# Patient Record
Sex: FEMALE | Race: BLACK OR AFRICAN AMERICAN | Employment: OTHER | URBAN - METROPOLITAN AREA
[De-identification: names, ages, dates, MRNs, and addresses within clinical notes are randomized per-mention and may not be internally consistent; named-entity substitution may affect disease eponyms.]

---

## 2022-05-23 ENCOUNTER — APPOINTMENT (OUTPATIENT)
Dept: NON INVASIVE DIAGNOSTICS | Age: 87
DRG: 871 | End: 2022-05-23
Attending: HOSPITALIST
Payer: MEDICARE

## 2022-05-23 ENCOUNTER — HOSPITAL ENCOUNTER (EMERGENCY)
Age: 87
Discharge: ANOTHER HOSPITAL | End: 2022-05-23
Attending: EMERGENCY MEDICINE
Payer: MEDICARE

## 2022-05-23 ENCOUNTER — APPOINTMENT (OUTPATIENT)
Dept: CT IMAGING | Age: 87
DRG: 871 | End: 2022-05-23
Attending: HOSPITALIST
Payer: MEDICARE

## 2022-05-23 ENCOUNTER — APPOINTMENT (OUTPATIENT)
Dept: GENERAL RADIOLOGY | Age: 87
End: 2022-05-23
Attending: EMERGENCY MEDICINE
Payer: MEDICARE

## 2022-05-23 ENCOUNTER — APPOINTMENT (OUTPATIENT)
Dept: CT IMAGING | Age: 87
End: 2022-05-23
Attending: EMERGENCY MEDICINE
Payer: MEDICARE

## 2022-05-23 ENCOUNTER — HOSPITAL ENCOUNTER (INPATIENT)
Age: 87
LOS: 4 days | Discharge: NURSING, REHAB OR LONG-TERM CARE | DRG: 871 | End: 2022-05-27
Attending: EMERGENCY MEDICINE | Admitting: HOSPITALIST
Payer: MEDICARE

## 2022-05-23 VITALS
HEART RATE: 96 BPM | SYSTOLIC BLOOD PRESSURE: 95 MMHG | RESPIRATION RATE: 34 BRPM | WEIGHT: 162.2 LBS | DIASTOLIC BLOOD PRESSURE: 42 MMHG | OXYGEN SATURATION: 96 % | TEMPERATURE: 100.8 F | BODY MASS INDEX: 27.02 KG/M2 | HEIGHT: 65 IN

## 2022-05-23 DIAGNOSIS — R06.02 SOB (SHORTNESS OF BREATH): Primary | ICD-10-CM

## 2022-05-23 DIAGNOSIS — J18.9 PNEUMONIA OF BOTH LUNGS DUE TO INFECTIOUS ORGANISM, UNSPECIFIED PART OF LUNG: Primary | ICD-10-CM

## 2022-05-23 PROBLEM — J96.90 RESPIRATORY FAILURE (HCC): Status: ACTIVE | Noted: 2022-05-23

## 2022-05-23 LAB
ALBUMIN SERPL-MCNC: 2.7 G/DL (ref 3.5–5)
ALBUMIN/GLOB SERPL: 0.8 {RATIO} (ref 1.1–2.2)
ALP SERPL-CCNC: 120 U/L (ref 45–117)
ALT SERPL-CCNC: 180 U/L (ref 12–78)
ANION GAP SERPL CALC-SCNC: 7 MMOL/L (ref 5–15)
ARTERIAL PATENCY WRIST A: ABNORMAL
AST SERPL W P-5'-P-CCNC: 117 U/L (ref 15–37)
ATRIAL RATE: 102 BPM
ATRIAL RATE: 89 BPM
BASE DEFICIT BLDA-SCNC: 11.2 MMOL/L (ref 0–2)
BASOPHILS # BLD: 0 K/UL (ref 0–0.2)
BASOPHILS NFR BLD: 0 % (ref 0–2.5)
BDY SITE: ABNORMAL
BILIRUB SERPL-MCNC: 1.4 MG/DL (ref 0.2–1)
BNP SERPL-MCNC: 5580 PG/ML
BUN SERPL-MCNC: 24 MG/DL (ref 6–20)
BUN/CREAT SERPL: 12 (ref 12–20)
CA-I BLD-MCNC: 8.8 MG/DL (ref 8.5–10.1)
CALCULATED P AXIS, ECG09: 58 DEGREES
CALCULATED P AXIS, ECG09: 66 DEGREES
CALCULATED R AXIS, ECG10: 10 DEGREES
CALCULATED R AXIS, ECG10: 22 DEGREES
CALCULATED T AXIS, ECG11: 18 DEGREES
CALCULATED T AXIS, ECG11: 70 DEGREES
CHLORIDE SERPL-SCNC: 101 MMOL/L (ref 97–108)
CO2 SERPL-SCNC: 31 MMOL/L (ref 21–32)
COHGB MFR BLD: 0.3 % (ref 0–5)
CREAT SERPL-MCNC: 2 MG/DL (ref 0.55–1.02)
DIAGNOSIS, 93000: NORMAL
DIAGNOSIS, 93000: NORMAL
EOSINOPHIL # BLD: 0 K/UL (ref 0–0.7)
EOSINOPHIL NFR BLD: 0 % (ref 0.9–2.9)
EPAP/CPAP/PEEP, PAPEEP: 5
ERYTHROCYTE [DISTWIDTH] IN BLOOD BY AUTOMATED COUNT: 14.9 % (ref 11.5–14.5)
FIO2 ON VENT: 70 %
FLUAV RNA SPEC QL NAA+PROBE: NOT DETECTED
FLUBV RNA SPEC QL NAA+PROBE: NOT DETECTED
GLOBULIN SER CALC-MCNC: 3.5 G/DL (ref 2–4)
GLUCOSE SERPL-MCNC: 104 MG/DL (ref 65–100)
HCO3 BLDA-SCNC: 12 MMOL/L (ref 22–26)
HCT VFR BLD AUTO: 38.1 % (ref 36–46)
HGB BLD OXIMETRY-MCNC: 12.5 G/DL (ref 13.5–17.5)
HGB BLD-MCNC: 12.5 G/DL (ref 13.5–17.5)
IPAP/PIP, IPAPIP: 15
LACTATE SERPL-SCNC: 3.1 MMOL/L (ref 0.4–2)
LACTATE SERPL-SCNC: 5.5 MMOL/L (ref 0.4–2)
LACTATE SERPL-SCNC: 8 MMOL/L (ref 0.4–2)
LACTATE SERPL-SCNC: 8.2 MMOL/L (ref 0.4–2)
LYMPHOCYTES # BLD: 2.1 K/UL (ref 1–4.8)
LYMPHOCYTES NFR BLD: 7 % (ref 20.5–51.1)
MCH RBC QN AUTO: 29.8 PG (ref 31–34)
MCHC RBC AUTO-ENTMCNC: 32.7 G/DL (ref 31–36)
MCV RBC AUTO: 91.1 FL (ref 80–100)
METHGB MFR BLD: 0.3 % (ref 0–5)
MONOCYTES # BLD: 0.7 K/UL (ref 0.2–2.4)
MONOCYTES NFR BLD: 2 % (ref 1.7–9.3)
MRSA DNA SPEC QL NAA+PROBE: NOT DETECTED
NEUTS SEG # BLD: 27 K/UL (ref 1.8–7.7)
NEUTS SEG NFR BLD: 91 % (ref 42–75)
NRBC # BLD: 0.01 K/UL
NRBC BLD-RTO: 0 PER 100 WBC
OXYHGB MFR BLD: 98.8 % (ref 95–100)
P-R INTERVAL, ECG05: 134 MS
P-R INTERVAL, ECG05: 140 MS
PCO2 BLDA: 20 MMHG (ref 35–45)
PH BLDA: 7.38 [PH] (ref 7.35–7.45)
PLATELET # BLD AUTO: 276 K/UL (ref 150–400)
PMV BLD AUTO: 8 FL (ref 6.5–11.5)
PO2 BLDA: 194 MMHG (ref 70–100)
POTASSIUM SERPL-SCNC: 3.9 MMOL/L (ref 3.5–5.1)
PRESSURE SUPPORT SETTING VENT: 10 CM[H2O]
PROT SERPL-MCNC: 6.2 G/DL (ref 6.4–8.2)
Q-T INTERVAL, ECG07: 378 MS
Q-T INTERVAL, ECG07: 416 MS
QRS DURATION, ECG06: 70 MS
QRS DURATION, ECG06: 74 MS
QTC CALCULATION (BEZET), ECG08: 459 MS
QTC CALCULATION (BEZET), ECG08: 545 MS
RBC # BLD AUTO: 4.18 M/UL (ref 4.5–5.9)
SAO2% DEVICE SAO2% SENSOR NAME: ABNORMAL
SARS-COV-2, COV2: NOT DETECTED
SODIUM SERPL-SCNC: 139 MMOL/L (ref 136–145)
SPECIMEN SITE: ABNORMAL
TROPONIN-HIGH SENSITIVITY: 71 NG/L (ref 0–51)
VENTRICULAR RATE, ECG03: 103 BPM
VENTRICULAR RATE, ECG03: 89 BPM
WBC # BLD AUTO: 29.9 K/UL (ref 4.4–11.3)

## 2022-05-23 PROCEDURE — 87641 MR-STAPH DNA AMP PROBE: CPT

## 2022-05-23 PROCEDURE — 74011000250 HC RX REV CODE- 250: Performed by: HOSPITALIST

## 2022-05-23 PROCEDURE — 99285 EMERGENCY DEPT VISIT HI MDM: CPT

## 2022-05-23 PROCEDURE — 83605 ASSAY OF LACTIC ACID: CPT

## 2022-05-23 PROCEDURE — 83880 ASSAY OF NATRIURETIC PEPTIDE: CPT

## 2022-05-23 PROCEDURE — 94660 CPAP INITIATION&MGMT: CPT

## 2022-05-23 PROCEDURE — 87636 SARSCOV2 & INF A&B AMP PRB: CPT

## 2022-05-23 PROCEDURE — 93306 TTE W/DOPPLER COMPLETE: CPT

## 2022-05-23 PROCEDURE — 96375 TX/PRO/DX INJ NEW DRUG ADDON: CPT

## 2022-05-23 PROCEDURE — 96365 THER/PROPH/DIAG IV INF INIT: CPT

## 2022-05-23 PROCEDURE — 74011000250 HC RX REV CODE- 250: Performed by: EMERGENCY MEDICINE

## 2022-05-23 PROCEDURE — 36600 WITHDRAWAL OF ARTERIAL BLOOD: CPT

## 2022-05-23 PROCEDURE — 93005 ELECTROCARDIOGRAM TRACING: CPT

## 2022-05-23 PROCEDURE — 96374 THER/PROPH/DIAG INJ IV PUSH: CPT

## 2022-05-23 PROCEDURE — 74011250636 HC RX REV CODE- 250/636: Performed by: EMERGENCY MEDICINE

## 2022-05-23 PROCEDURE — 65610000006 HC RM INTENSIVE CARE

## 2022-05-23 PROCEDURE — 36415 COLL VENOUS BLD VENIPUNCTURE: CPT

## 2022-05-23 PROCEDURE — 85025 COMPLETE CBC W/AUTO DIFF WBC: CPT

## 2022-05-23 PROCEDURE — 77010033678 HC OXYGEN DAILY

## 2022-05-23 PROCEDURE — 84484 ASSAY OF TROPONIN QUANT: CPT

## 2022-05-23 PROCEDURE — 80053 COMPREHEN METABOLIC PANEL: CPT

## 2022-05-23 PROCEDURE — 74011000258 HC RX REV CODE- 258: Performed by: HOSPITALIST

## 2022-05-23 PROCEDURE — 71045 X-RAY EXAM CHEST 1 VIEW: CPT

## 2022-05-23 PROCEDURE — 5A09357 ASSISTANCE WITH RESPIRATORY VENTILATION, LESS THAN 24 CONSECUTIVE HOURS, CONTINUOUS POSITIVE AIRWAY PRESSURE: ICD-10-PCS | Performed by: HOSPITALIST

## 2022-05-23 PROCEDURE — 94640 AIRWAY INHALATION TREATMENT: CPT

## 2022-05-23 PROCEDURE — 74011250636 HC RX REV CODE- 250/636: Performed by: HOSPITALIST

## 2022-05-23 PROCEDURE — 82803 BLOOD GASES ANY COMBINATION: CPT

## 2022-05-23 PROCEDURE — 87040 BLOOD CULTURE FOR BACTERIA: CPT

## 2022-05-23 RX ORDER — SERTRALINE HYDROCHLORIDE 50 MG/1
50 TABLET, FILM COATED ORAL DAILY
COMMUNITY

## 2022-05-23 RX ORDER — LOPERAMIDE HYDROCHLORIDE 2 MG/1
4 CAPSULE ORAL AS NEEDED
COMMUNITY

## 2022-05-23 RX ORDER — AMLODIPINE BESYLATE 5 MG/1
10 TABLET ORAL DAILY
Status: DISCONTINUED | OUTPATIENT
Start: 2022-05-24 | End: 2022-05-28 | Stop reason: HOSPADM

## 2022-05-23 RX ORDER — ACETAMINOPHEN 650 MG/1
650 SUPPOSITORY RECTAL
Status: DISCONTINUED | OUTPATIENT
Start: 2022-05-23 | End: 2022-05-28 | Stop reason: HOSPADM

## 2022-05-23 RX ORDER — AMLODIPINE BESYLATE 10 MG/1
10 TABLET ORAL DAILY
COMMUNITY
End: 2022-05-27

## 2022-05-23 RX ORDER — ASPIRIN 81 MG/1
81 TABLET ORAL DAILY
Status: DISCONTINUED | OUTPATIENT
Start: 2022-05-24 | End: 2022-05-28 | Stop reason: HOSPADM

## 2022-05-23 RX ORDER — SODIUM CHLORIDE 0.9 % (FLUSH) 0.9 %
5-40 SYRINGE (ML) INJECTION EVERY 8 HOURS
Status: DISCONTINUED | OUTPATIENT
Start: 2022-05-23 | End: 2022-05-28 | Stop reason: HOSPADM

## 2022-05-23 RX ORDER — LEVOFLOXACIN 5 MG/ML
750 INJECTION, SOLUTION INTRAVENOUS ONCE
Status: COMPLETED | OUTPATIENT
Start: 2022-05-23 | End: 2022-05-23

## 2022-05-23 RX ORDER — SERTRALINE HYDROCHLORIDE 50 MG/1
50 TABLET, FILM COATED ORAL DAILY
Status: DISCONTINUED | OUTPATIENT
Start: 2022-05-24 | End: 2022-05-28 | Stop reason: HOSPADM

## 2022-05-23 RX ORDER — POTASSIUM CHLORIDE 750 MG/1
20 TABLET, FILM COATED, EXTENDED RELEASE ORAL DAILY
COMMUNITY

## 2022-05-23 RX ORDER — ASPIRIN 81 MG/1
81 TABLET ORAL DAILY
COMMUNITY

## 2022-05-23 RX ORDER — ONDANSETRON 4 MG/1
4 TABLET, ORALLY DISINTEGRATING ORAL
Status: DISCONTINUED | OUTPATIENT
Start: 2022-05-23 | End: 2022-05-28 | Stop reason: HOSPADM

## 2022-05-23 RX ORDER — LATANOPROST 50 UG/ML
1 SOLUTION/ DROPS OPHTHALMIC
COMMUNITY

## 2022-05-23 RX ORDER — LORAZEPAM 2 MG/ML
1 INJECTION INTRAMUSCULAR
Status: COMPLETED | OUTPATIENT
Start: 2022-05-23 | End: 2022-05-23

## 2022-05-23 RX ORDER — LEVOFLOXACIN 5 MG/ML
750 INJECTION, SOLUTION INTRAVENOUS EVERY 24 HOURS
Status: DISCONTINUED | OUTPATIENT
Start: 2022-05-23 | End: 2022-05-23

## 2022-05-23 RX ORDER — SODIUM CHLORIDE 0.9 % (FLUSH) 0.9 %
5-40 SYRINGE (ML) INJECTION AS NEEDED
Status: DISCONTINUED | OUTPATIENT
Start: 2022-05-23 | End: 2022-05-28 | Stop reason: HOSPADM

## 2022-05-23 RX ORDER — SODIUM CHLORIDE 0.9 % (FLUSH) 0.9 %
5-10 SYRINGE (ML) INJECTION AS NEEDED
Status: DISCONTINUED | OUTPATIENT
Start: 2022-05-23 | End: 2022-05-23 | Stop reason: HOSPADM

## 2022-05-23 RX ORDER — IPRATROPIUM BROMIDE AND ALBUTEROL SULFATE 2.5; .5 MG/3ML; MG/3ML
3 SOLUTION RESPIRATORY (INHALATION)
Status: COMPLETED | OUTPATIENT
Start: 2022-05-23 | End: 2022-05-23

## 2022-05-23 RX ORDER — FUROSEMIDE 10 MG/ML
40 INJECTION INTRAMUSCULAR; INTRAVENOUS 2 TIMES DAILY
Status: DISCONTINUED | OUTPATIENT
Start: 2022-05-23 | End: 2022-05-28 | Stop reason: HOSPADM

## 2022-05-23 RX ORDER — LOSARTAN POTASSIUM 50 MG/1
50 TABLET ORAL DAILY
COMMUNITY

## 2022-05-23 RX ORDER — ACETAMINOPHEN 325 MG/1
650 TABLET ORAL
Status: DISCONTINUED | OUTPATIENT
Start: 2022-05-23 | End: 2022-05-28 | Stop reason: HOSPADM

## 2022-05-23 RX ORDER — ATENOLOL 50 MG/1
50 TABLET ORAL DAILY
Status: DISCONTINUED | OUTPATIENT
Start: 2022-05-24 | End: 2022-05-26

## 2022-05-23 RX ORDER — ONDANSETRON 2 MG/ML
4 INJECTION INTRAMUSCULAR; INTRAVENOUS
Status: DISCONTINUED | OUTPATIENT
Start: 2022-05-23 | End: 2022-05-28 | Stop reason: HOSPADM

## 2022-05-23 RX ORDER — OMEPRAZOLE 20 MG/1
20 CAPSULE, DELAYED RELEASE ORAL DAILY
COMMUNITY

## 2022-05-23 RX ORDER — ACETAMINOPHEN 500 MG
500 TABLET ORAL
COMMUNITY

## 2022-05-23 RX ORDER — ATENOLOL 50 MG/1
50 TABLET ORAL DAILY
COMMUNITY
End: 2022-05-27

## 2022-05-23 RX ORDER — HALOPERIDOL 5 MG/ML
4 INJECTION INTRAMUSCULAR
Status: DISCONTINUED | OUTPATIENT
Start: 2022-05-23 | End: 2022-05-28 | Stop reason: HOSPADM

## 2022-05-23 RX ORDER — ENOXAPARIN SODIUM 100 MG/ML
30 INJECTION SUBCUTANEOUS DAILY
Status: DISCONTINUED | OUTPATIENT
Start: 2022-05-24 | End: 2022-05-28 | Stop reason: HOSPADM

## 2022-05-23 RX ORDER — POLYETHYLENE GLYCOL 3350 17 G/17G
17 POWDER, FOR SOLUTION ORAL DAILY PRN
Status: DISCONTINUED | OUTPATIENT
Start: 2022-05-23 | End: 2022-05-28 | Stop reason: HOSPADM

## 2022-05-23 RX ADMIN — SODIUM CHLORIDE, PRESERVATIVE FREE 10 ML: 5 INJECTION INTRAVENOUS at 21:11

## 2022-05-23 RX ADMIN — SODIUM CHLORIDE, PRESERVATIVE FREE 10 ML: 5 INJECTION INTRAVENOUS at 16:32

## 2022-05-23 RX ADMIN — METHYLPREDNISOLONE SODIUM SUCCINATE 125 MG: 125 INJECTION, POWDER, FOR SOLUTION INTRAMUSCULAR; INTRAVENOUS at 05:47

## 2022-05-23 RX ADMIN — FUROSEMIDE 40 MG: 10 INJECTION, SOLUTION INTRAMUSCULAR; INTRAVENOUS at 20:35

## 2022-05-23 RX ADMIN — PIPERACILLIN SODIUM AND TAZOBACTAM SODIUM 4.5 G: 4; .5 INJECTION, POWDER, LYOPHILIZED, FOR SOLUTION INTRAVENOUS at 16:32

## 2022-05-23 RX ADMIN — PIPERACILLIN AND TAZOBACTAM 3.38 G: 3; .375 INJECTION, POWDER, LYOPHILIZED, FOR SOLUTION INTRAVENOUS at 20:34

## 2022-05-23 RX ADMIN — LORAZEPAM 1 MG: 2 INJECTION INTRAMUSCULAR; INTRAVENOUS at 10:11

## 2022-05-23 RX ADMIN — VANCOMYCIN HYDROCHLORIDE 1000 MG: 1 INJECTION, POWDER, LYOPHILIZED, FOR SOLUTION INTRAVENOUS at 08:23

## 2022-05-23 RX ADMIN — IPRATROPIUM BROMIDE AND ALBUTEROL SULFATE 3 ML: .5; 2.5 SOLUTION RESPIRATORY (INHALATION) at 05:47

## 2022-05-23 RX ADMIN — LEVOFLOXACIN 750 MG: 5 INJECTION, SOLUTION INTRAVENOUS at 07:50

## 2022-05-23 NOTE — CONSULTS
Consult    NAME: Paul Vee   :  1935   MRN:  [id number]     Date/Time:  2022 5:14 PM    Patient PCP: Milton Whitaker MD  ________________________________________________________________________    Assessment:   Primary cardiologist: None    PROBLEM LIST:  1. Incomplete database secondary to patient being a poor historian  2. Patient presents for evaluation of shortness of breath  3. Acute hypoxic respiratory failure requiring BiPAP on admission  4. Bilateral aspiration pneumonia  5. Sepsis  6. Previous cerebrovascular accident (CVA)  7. Hypertension  8. Type 2 diabetes  9. Elevated cardiac enzymes in the indeterminate range  10. Elevated BNP (5580 pg/mL)    11. Chronic kidney disease  12. Gastroesophageal reflux disease (GERD)  13. Debility  14. Transaminitis  15. Leukocytosis  16. Anemia        []        High complexity decision making was performed        Subjective:   CHIEF COMPLAINT:     HISTORY OF PRESENT ILLNESS:     This 80-year-old -American female without known coronary disease, or congestive heart failure presents for evaluation of shortness of breath. She was noted to have acute hypoxic respiratory failure requiring BiPAP on admission. She also was found to be septic requiring intensive care unit (ICU) evaluation and management. As a part of her evaluation her high-sensitivity troponin, and-BNP were found to be elevated. Cardiology is therefore consulted to assist in evaluation and management. The patient does give a history of 2 to 3 days shortness of breath and productive cough. She is bedbound and denies any dyspnea on exertion. She does describe some 2-3 pillow orthopnea. She specifically denies any chest pain, pressure or tightness. Further there is no awareness of palpitations, or skipped beats.       Past Medical History:   Diagnosis Date    Diabetes ([facility] [address]     Gastrointestinal disorder     Hypertension     Stroke ([facility] [address] History reviewed. No pertinent surgical history. Allergies   Allergen Reactions    Pcn [Penicillins] Unknown (comments)      Meds:  See below  Social History     Tobacco Use    Smoking status: Never Smoker    Smokeless tobacco: Never Used   Substance Use Topics    Alcohol use: Not on file      History reviewed. No pertinent family history. REVIEW OF SYSTEMS:     [x]         Unable to obtain  ROS due to the patient being a poor historian. Objective:      Physical Exam:    Last 24hrs VS reviewed since prior progress note. Most recent are:    Visit Vitals  /62 (BP 1 Location: Right upper arm, BP Patient Position: At rest)   Pulse 88   Temp 99.3 °F (37.4 °C)   Resp 26   Ht 5' 5\" (1.651 m)   Wt 75.9 kg (167 lb 5.3 oz)   SpO2 91%   BMI 27.85 kg/m²     No intake or output data in the 24 hours ending 05/23/22 1714     General Appearance: Well developed, overweight examined with supplemental oxygen via nasal cannula. Ears/Nose/Mouth/Throat: Atraumatic, normocephalic, PERRL. Neck: Supple. JVP within normal limits. Chest: Lungs clear to auscultation bilaterally. Cardiovascular: JVP is not elevated, PMI is not attempted, normal intensity S1 and S2, without S3. Abdomen: Soft, non-tender, non-tender, bowel sounds present. Extremities: No edema bilaterally. Skin: Warm and dry. Neuro: CN II-XII grossly intact, without gross motor/sensory deficit. Data:      Telemetry:    EKG:  []  No new EKG for review  CT CHEST WO CONT    (Results Pending)   XR CHEST PORT    (Results Pending)   XR CHEST PORT    (Results Pending)   XR CHEST PORT    (Results Pending)        Prior to Admission medications    Medication Sig Start Date End Date Taking? Authorizing Provider   amLODIPine (NORVASC) 10 mg tablet Take 10 mg by mouth daily. Yes Provider, Historical   aspirin delayed-release 81 mg tablet Take 81 mg by mouth daily. Yes Provider, Historical   atenoloL (TENORMIN) 50 mg tablet Take 50 mg by mouth daily.    Yes Provider, Historical   latanoprost (XALATAN) 0.005 % ophthalmic solution Administer 1 Drop to both eyes nightly. Yes Provider, Historical   losartan (COZAAR) 50 mg tablet Take 50 mg by mouth daily. Yes Provider, Historical   omeprazole (PRILOSEC) 20 mg capsule Take 20 mg by mouth daily. Yes Provider, Historical   potassium chloride SR (KLOR-CON 10) 10 mEq tablet Take 20 mEq by mouth daily. Yes Provider, Historical   sertraline (ZOLOFT) 50 mg tablet Take 50 mg by mouth daily. Indications: major depressive disorder   Yes Provider, Historical   acetaminophen (TYLENOL) 500 mg tablet Take 500 mg by mouth every four (4) hours as needed for Pain. Yes Provider, Historical   loperamide (IMODIUM) 2 mg capsule Take 4 mg by mouth as needed for Diarrhea. Give 2 capsules by mouth every hour as needed for diarrhea after each loose stool up to 4 doses   Yes Provider, Historical       Recent Results (from the past 24 hour(s))   CBC WITH AUTOMATED DIFF    Collection Time: 05/23/22  5:45 AM   Result Value Ref Range    WBC 29.9 (H) 4.4 - 11.3 K/uL    RBC 4.18 (L) 4.50 - 5.90 M/uL    HGB 12.5 (L) 13.5 - 17.5 g/dL    HCT 38.1 36 - 46 %    MCV 91.1 80 - 100 FL    MCH 29.8 (L) 31 - 34 PG    MCHC 32.7 31.0 - 36.0 g/dL    RDW 14.9 (H) 11.5 - 14.5 %    PLATELET 787 397 - 204 K/uL    MPV 8.0 6.5 - 11.5 FL    NRBC 0.0  WBC    ABSOLUTE NRBC 0.01 K/uL    NEUTROPHILS 91 (H) 42 - 75 %    LYMPHOCYTES 7 (L) 20.5 - 51.1 %    MONOCYTES 2 1.7 - 9.3 %    EOSINOPHILS 0 (L) 0.9 - 2.9 %    BASOPHILS 0 0.0 - 2.5 %    ABS. NEUTROPHILS 27.0 (H) 1.8 - 7.7 K/UL    ABS. LYMPHOCYTES 2.1 1.0 - 4.8 K/UL    ABS. MONOCYTES 0.7 0.2 - 2.4 K/UL    ABS. EOSINOPHILS 0.0 0.0 - 0.7 K/UL    ABS.  BASOPHILS 0.0 0.0 - 0.2 K/UL   METABOLIC PANEL, COMPREHENSIVE    Collection Time: 05/23/22  5:45 AM   Result Value Ref Range    Sodium 139 136 - 145 mmol/L    Potassium 3.9 3.5 - 5.1 mmol/L    Chloride 101 97 - 108 mmol/L    CO2 31 21 - 32 mmol/L    Anion gap 7 5 - 15 mmol/L    Glucose 104 (H) 65 - 100 mg/dL    BUN 24 (H) 6 - 20 mg/dL    Creatinine 2.00 (H) 0.55 - 1.02 mg/dL    BUN/Creatinine ratio 12 12 - 20      GFR est AA 29 (L) >60 ml/min/1.73m2    GFR est non-AA 24 (L) >60 ml/min/1.73m2    Calcium 8.8 8.5 - 10.1 mg/dL    Bilirubin, total 1.4 (H) 0.2 - 1.0 mg/dL    AST (SGOT) 117 (H) 15 - 37 U/L    ALT (SGPT) 180 (H) 12 - 78 U/L    Alk.  phosphatase 120 (H) 45 - 117 U/L    Protein, total 6.2 (L) 6.4 - 8.2 g/dL    Albumin 2.7 (L) 3.5 - 5.0 g/dL    Globulin 3.5 2.0 - 4.0 g/dL    A-G Ratio 0.8 (L) 1.1 - 2.2     TROPONIN-HIGH SENSITIVITY    Collection Time: 05/23/22  5:45 AM   Result Value Ref Range    Troponin-High Sensitivity 71 (H) 0 - 51 ng/L   NT-PRO BNP    Collection Time: 05/23/22  5:45 AM   Result Value Ref Range    NT pro-BNP 5,580 (H) <450 pg/mL   EKG, 12 LEAD, INITIAL    Collection Time: 05/23/22  5:49 AM   Result Value Ref Range    Ventricular Rate 103 BPM    Atrial Rate 102 BPM    P-R Interval 140 ms    QRS Duration 70 ms    Q-T Interval 416 ms    QTC Calculation (Bezet) 545 ms    Calculated P Axis 58 degrees    Calculated R Axis 10 degrees    Calculated T Axis 18 degrees    Diagnosis       Sinus tachycardia  Low voltage, precordial leads  Borderline repolarization abnormality  Prolonged QT interval    Confirmed by Fabiola Holder M.D., Rowena Ryan ([id number]) on 5/23/2022 8:57:45 AM     COVID-19 WITH INFLUENZA A/B    Collection Time: 05/23/22  6:20 AM   Result Value Ref Range    SARS-CoV-2 by PCR Not Detected Not Detected      Influenza A by PCR Not Detected Not Detected      Influenza B by PCR Not Detected Not Detected     BLOOD GAS, ARTERIAL    Collection Time: 05/23/22  7:11 AM   Result Value Ref Range    pH 7.38 7.35 - 7.45      PCO2 20 (LL) 35 - 45 mmHg    PO2 194 (H) 70 - 100 mmHg    BICARBONATE 12 (L) 22 - 26 mmol/L    BASE DEFICIT 11.2 (H) 0 - 2 mmol/L    O2 METHOD BiPAP      FIO2 70.0 %    PRESSURE SUPPORT 10      IPAP/PIP 15      EPAP/CPAP/PEEP 5      Sample source Arterial      SITE Left Radial      JOHN'S TEST PASS      Carboxy-Hgb 0.3 0 - 5 %    Methemoglobin 0.3 0 - 5 %    tHb 12.5 (L) 13.5 - 17.5 g/dL    Oxyhemoglobin 98.8 95 - 100 %   LACTIC ACID    Collection Time: 05/23/22  7:15 AM   Result Value Ref Range    Lactic acid 8.0 (HH) 0.4 - 2.0 mmol/L   EKG, 12 LEAD, INITIAL    Collection Time: 05/23/22  9:39 AM   Result Value Ref Range    Ventricular Rate 89 BPM    Atrial Rate 89 BPM    P-R Interval 134 ms    QRS Duration 74 ms    Q-T Interval 378 ms    QTC Calculation (Bezet) 459 ms    Calculated P Axis 66 degrees    Calculated R Axis 22 degrees    Calculated T Axis 70 degrees    Diagnosis       Normal sinus rhythm  Nonspecific T wave abnormality  Abnormal ECG  When compared with ECG of 23-MAY-2022 05:49,  PREVIOUS ECG IS PRESENT  Confirmed by FERNIE DC, Rina Prajapati ([id number]) on 5/23/2022 2:56:58 PM     LACTIC ACID    Collection Time: 05/23/22  9:49 AM   Result Value Ref Range    Lactic acid 8.2 (HH) 0.4 - 2.0 mmol/L   MRSA SCREEN - PCR (NASAL)    Collection Time: 05/23/22 12:45 PM   Result Value Ref Range    MRSA by PCR, Nasal Not Detected Not Detected     ECHO ADULT COMPLETE    Collection Time: 05/23/22  3:37 PM   Result Value Ref Range    LA Major [place] 5.0 cm    LA Area 4C 13.1 cm2    LA Diameter 2.9 cm    AV Peak Velocity 1.7 m/s    AV Peak Gradient 11 mmHg    AV Area by Peak Velocity 2.1 cm2    Aortic Root 2.7 cm    IVSd 0.9 0.6 - 0.9 cm    LVIDd 4.3 3.9 - 5.3 cm    LVIDs 2.9 cm    LVOT Diameter 1.8 cm    LVOT Peak Velocity 1.4 m/s    LVOT Peak Gradient 7 mmHg    LVPWd 0.7 0.6 - 0.9 cm    LV E' Lateral Velocity 8 cm/s    LV E' Septal Velocity 7 cm/s    LVOT Area 2.5 cm2    MR Peak Velocity 4.8 m/s    MR Peak Gradient 92 mmHg    MV Max Velocity 1.2 m/s    MV Peak Gradient 6 mmHg    MV E Wave Deceleration Time 176.0 ms    MV A Velocity 1.19 m/s    MV E Velocity 0.84 m/s    MV Mean Gradient 2 mmHg    MV VTI 29.8 cm    MV Mean Velocity 0.7 m/s    Pulm Vein A Duration 113.0 ms Pulm Vein A Velocity 0.5 m/s    Est. RA Pressure 3 mmHg    TR Max Velocity 3.29 m/s    TR Peak Gradient 43 mmHg    Fractional Shortening 2D 33 28 - 44 %    LVIDd Index 2.35 cm/m2    LVIDs Index 1.58 cm/m2    LV RWT Ratio 0.33     LV Mass 2D 105.3 67 - 162 g    LV Mass 2D Index 57.6 43 - 95 g/m2    MV E/A 0.71     E/E' Ratio (Averaged) 11.25     E/E' Lateral 10.50     E/E' Septal 12.00     LA Size Index 1.58 cm/m2    LA/AO Root Ratio 1.07     Ao Root Index 1.48 cm/m2    AV Velocity Ratio 0.82     MONIQUE/BSA Peak Velocity 1.1 cm2/m2    RVSP 46 mmHg    EF BP 62 55 - 100 %        Plan:   1. Continue telemetry monitor  2. Conclude serial cardiac enzymes   3. Monitor serum electrolytes, and renal function  4. Monitor fluid balance, and daily weights  5. Obtain complete 2D echocardiogram results    6. Continue current cardiovascular medications including aspirin, atenolol, enoxaparin, and furosemide  7.   Further recommendation depending on above results, and clinical course     Farheen Pond MD

## 2022-05-23 NOTE — PROGRESS NOTES
Reason for Admission:  Respiratory Failure                     RUR Score:  13%                 Plan for utilizing home health:   None. Brother Leona Scales) verbally consented to Choice Letter for pt to return to [facility] . Referral sent via [facility]. Bed/w/c bound. PCP: First and Last name:  Ashish Ovalle MD     Name of Practice:    Are you a current patient: Yes/No: Yes   Approximate date of last visit: Seen @ the facility. Can you participate in a virtual visit with your PCP: No                    Current Advanced Directive/Advance Care Plan: No Order      Healthcare Decision Maker:              Primary Decision Maker: [facility] - [phone]                  Transition of Care Plan:  D/C Plan is to return to [facility] via transportation.

## 2022-05-23 NOTE — ED TRIAGE NOTES
Transfer from [facility] for PNA. Arrives on BiPAP. Went to [facility] with SOB x 24 hours.      20 l hand, 20 l ac    Given 1 mg versed by [facility] at 1547

## 2022-05-23 NOTE — ACP (ADVANCE CARE PLANNING)
Advance Care Planning   Healthcare Decision Maker:       Primary Decision Maker: Brannon Garcia - [phone]

## 2022-05-23 NOTE — H&P
History and Physical    Subjective:   Chief Complaint : shortness of breath since 1 day  Source of information : charts. Patient too SOB for history,    Patient from [facility]  History from brother - wants her full code  History of present illness:     86F, h/o HTN, GERD, CVA bed bound does not walk, depression with shortness of breath since 1 day    She was ar [facility] and has been short of breath since 1 day, she was given etra lasix yesterday, and EMS was called, they placed her on NRB and her saturation went up to 97% with duo nebs. She was then transferred from [place] to [address] [place] [address]. ER: was placed on bipap and then to [place] [address]. Past Medical History:   Diagnosis Date    Diabetes ([facility] [address]     Gastrointestinal disorder     Hypertension     Stroke [facility])      History reviewed. No pertinent surgical history. History reviewed. No pertinent family history. Social History     Tobacco Use    Smoking status: Never Smoker    Smokeless tobacco: Never Used   Substance Use Topics    Alcohol use: Not on file       Prior to Admission medications    Medication Sig Start Date End Date Taking? Authorizing Provider   amLODIPine (NORVASC) 10 mg tablet Take 10 mg by mouth daily. Yes Provider, Historical   aspirin delayed-release 81 mg tablet Take 81 mg by mouth daily. Yes Provider, Historical   atenoloL (TENORMIN) 50 mg tablet Take 50 mg by mouth daily. Yes Provider, Historical   latanoprost (XALATAN) 0.005 % ophthalmic solution Administer 1 Drop to both eyes nightly. Yes Provider, Historical   losartan (COZAAR) 50 mg tablet Take 50 mg by mouth daily. Yes Provider, Historical   omeprazole (PRILOSEC) 20 mg capsule Take 20 mg by mouth daily. Yes Provider, Historical   potassium chloride SR (KLOR-CON 10) 10 mEq tablet Take 20 mEq by mouth daily. Yes Provider, Historical   sertraline (ZOLOFT) 50 mg tablet Take 50 mg by mouth daily.  Indications: major depressive disorder   Yes Provider, Historical   acetaminophen (TYLENOL) 500 mg tablet Take 500 mg by mouth every four (4) hours as needed for Pain. Yes Provider, Historical   loperamide (IMODIUM) 2 mg capsule Take 4 mg by mouth as needed for Diarrhea. Give 2 capsules by mouth every hour as needed for diarrhea after each loose stool up to 4 doses   Yes Provider, Historical     Allergies   Allergen Reactions    Pcn [Penicillins] Unknown (comments)             ROS  Constitutional: Negative except as in HPI. Eyes: Negative except as in HPI.  ENT: Negative except as in HPI. Cardiovascular: Negative except as in HPI. Respiratory: Negative except as in HPI. Gastrointestinal: Negative except as in HPI. Genitourinary: Negative except as in HPI. Musculoskeletal: Negative except as in HPI. Integumentary: Negative except as in HPI. Neurological: Negative except as in HPI. Psychiatric: Negative except as in HPI. Endocrine: Negative except as in HPI. Hematologic/Lymphatic: Negative except as in HPI. Allergic/Immunologic: Negative except as in HPI.   Vitals:     Visit Vitals  /70   Pulse 87   Temp 97.8 °F (36.6 °C)   Resp 18   Ht 5' 5\" (1.651 m)   Wt 73.5 kg (162 lb)   SpO2 93%   BMI 26.96 kg/m²       Constitutional: Awake and alert, interactive, NAD but cant understand a word  Eyes: PERRL, no injection or scleral icterus, no discharge  HEENT: NCAT, neck supple, MMM, no oropharyngeal exudates  CV: RRR, no m/r/g  Respiratory: Tachypneic, saturating 92% on BiPAP  GI: Abd soft, nondistended, nontender  : Deferred  Skin: No rashes  Neuro: CN2-12 intact, symmetric facies,         Data Review:   Recent Results (from the past 24 hour(s))   CBC WITH AUTOMATED DIFF    Collection Time: 05/23/22  5:45 AM   Result Value Ref Range    WBC 29.9 (H) 4.4 - 11.3 K/uL    RBC 4.18 (L) 4.50 - 5.90 M/uL    HGB 12.5 (L) 13.5 - 17.5 g/dL    HCT 38.1 36 - 46 %    MCV 91.1 80 - 100 FL    MCH 29.8 (L) 31 - 34 PG    MCHC 32.7 31.0 - 36.0 g/dL    RDW 14.9 (H) 11.5 - 14.5 %    PLATELET 274 090 - 028 K/uL    MPV 8.0 6.5 - 11.5 FL    NRBC 0.0  WBC    ABSOLUTE NRBC 0.01 K/uL    NEUTROPHILS 91 (H) 42 - 75 %    LYMPHOCYTES 7 (L) 20.5 - 51.1 %    MONOCYTES 2 1.7 - 9.3 %    EOSINOPHILS 0 (L) 0.9 - 2.9 %    BASOPHILS 0 0.0 - 2.5 %    ABS. NEUTROPHILS 27.0 (H) 1.8 - 7.7 K/UL    ABS. LYMPHOCYTES 2.1 1.0 - 4.8 K/UL    ABS. MONOCYTES 0.7 0.2 - 2.4 K/UL    ABS. EOSINOPHILS 0.0 0.0 - 0.7 K/UL    ABS. BASOPHILS 0.0 0.0 - 0.2 K/UL   METABOLIC PANEL, COMPREHENSIVE    Collection Time: 05/23/22  5:45 AM   Result Value Ref Range    Sodium 139 136 - 145 mmol/L    Potassium 3.9 3.5 - 5.1 mmol/L    Chloride 101 97 - 108 mmol/L    CO2 31 21 - 32 mmol/L    Anion gap 7 5 - 15 mmol/L    Glucose 104 (H) 65 - 100 mg/dL    BUN 24 (H) 6 - 20 mg/dL    Creatinine 2.00 (H) 0.55 - 1.02 mg/dL    BUN/Creatinine ratio 12 12 - 20      GFR est AA 29 (L) >60 ml/min/1.73m2    GFR est non-AA 24 (L) >60 ml/min/1.73m2    Calcium 8.8 8.5 - 10.1 mg/dL    Bilirubin, total 1.4 (H) 0.2 - 1.0 mg/dL    AST (SGOT) 117 (H) 15 - 37 U/L    ALT (SGPT) 180 (H) 12 - 78 U/L    Alk.  phosphatase 120 (H) 45 - 117 U/L    Protein, total 6.2 (L) 6.4 - 8.2 g/dL    Albumin 2.7 (L) 3.5 - 5.0 g/dL    Globulin 3.5 2.0 - 4.0 g/dL    A-G Ratio 0.8 (L) 1.1 - 2.2     TROPONIN-HIGH SENSITIVITY    Collection Time: 05/23/22  5:45 AM   Result Value Ref Range    Troponin-High Sensitivity 71 (H) 0 - 51 ng/L   NT-PRO BNP    Collection Time: 05/23/22  5:45 AM   Result Value Ref Range    NT pro-BNP 5,580 (H) <450 pg/mL   EKG, 12 LEAD, INITIAL    Collection Time: 05/23/22  5:49 AM   Result Value Ref Range    Ventricular Rate 103 BPM    Atrial Rate 102 BPM    P-R Interval 140 ms    QRS Duration 70 ms    Q-T Interval 416 ms    QTC Calculation (Bezet) 545 ms    Calculated P Axis 58 degrees    Calculated R Axis 10 degrees    Calculated T Axis 18 degrees    Diagnosis       Sinus tachycardia  Low voltage, precordial leads  Borderline repolarization abnormality  Prolonged QT interval    Confirmed by Maria Luz Jara M.D., Emerald Aggarwal ([id number]) on 5/23/2022 8:57:45 AM     COVID-19 WITH INFLUENZA A/B    Collection Time: 05/23/22  6:20 AM   Result Value Ref Range    SARS-CoV-2 by PCR Not Detected Not Detected      Influenza A by PCR Not Detected Not Detected      Influenza B by PCR Not Detected Not Detected     BLOOD GAS, ARTERIAL    Collection Time: 05/23/22  7:11 AM   Result Value Ref Range    pH 7.38 7.35 - 7.45      PCO2 20 (LL) 35 - 45 mmHg    PO2 194 (H) 70 - 100 mmHg    BICARBONATE 12 (L) 22 - 26 mmol/L    BASE DEFICIT 11.2 (H) 0 - 2 mmol/L    O2 METHOD BiPAP      FIO2 70.0 %    PRESSURE SUPPORT 10      IPAP/PIP 15      EPAP/CPAP/PEEP 5      Sample source Arterial      SITE Left Radial      JOHN'S TEST PASS      Carboxy-Hgb 0.3 0 - 5 %    Methemoglobin 0.3 0 - 5 %    tHb 12.5 (L) 13.5 - 17.5 g/dL    Oxyhemoglobin 98.8 95 - 100 %   LACTIC ACID    Collection Time: 05/23/22  7:15 AM   Result Value Ref Range    Lactic acid 8.0 (HH) 0.4 - 2.0 mmol/L   LACTIC ACID    Collection Time: 05/23/22  9:49 AM   Result Value Ref Range    Lactic acid 8.2 (HH) 0.4 - 2.0 mmol/L             Assessment and Plan :     (1) Acute encephalopathy : GCS 13, likely s.t sepsis    (2) sepsis: vanc and zosyn, order CT chest. Consul pulm, duo nebs, LA Q4H until less than2    (3) pneumonia: vanc and zosyn. (4) CHF unknown EF: lasix 40 BID, ECHO    (5) NIK : likely CRS    (6) GERD: protonix    DVT ppx: lovenox     DISPO: PT , NH when stable      There was no medication fro,. But they had medication list from another hospital, and has been verifies.      Signed By: Marina Castro MD     May 23, 2022

## 2022-05-23 NOTE — ED PROVIDER NOTES
EMERGENCY DEPARTMENT HISTORY AND PHYSICAL EXAM  ?    Date: 5/23/2022  Patient Name: Joanna Chawla    History of Presenting Illness    Patient presents with:  Respiratory Distress      History Provided By: Patient , EMS and Nursing Home/SNF/Rehab Center    HPI: Joanna Chawla, 81 y.o. female with a past medical history significant for hypertension and stroke presents to the ED with cc of breath since yesterday. EMS reports O2 saturation 70%. Patient was given Lasix and albuterol neb treatment. There are no other complaints, changes, or physical findings at this time. PCP: Nishant Mata MD        Past History    Past Medical History:  No past medical history on file. Past Surgical History:  No past surgical history on file. Family History:  No family history on file.       Social History:  Social History   Tobacco Use     Smoking status: Not on file     Smokeless tobacco: Not on file   Alcohol use: Not on file   Drug use: Not on file      Allergies:  -- Pcn (Penicillins) -- Unknown (comments)      Review of Systems  @[facility]@    Physical Exam  [unfilled]    Diagnostic Study Results    Labs -  Recent Results (from the past 12 hour(s))  -EKG, 12 LEAD, INITIAL:   Collection Time: 05/23/22  5:49 AM      Result                      Value             Ref Range          Ventricular Rate            103               BPM                Atrial Rate                 102               BPM                P-R Interval                140               ms                 QRS Duration                70                ms                 Q-T Interval                416               ms                 QTC Calculation (Bezet)     545               ms                 Calculated P Axis           58                degrees            Calculated R Axis           10                degrees            Calculated T Axis           18                degrees            Diagnosis Sinus tachycardia Low voltage, precordial leads Borderline repolarization abnormality Prolonged QT interval     Radiologic Studies -   XR CHEST PORT  Final Result   In the acute setting diffuse bilateral airspace disease is   suggestive of pneumonia, although atypical appearance of edema, ARDS, or   pulmonary hemorrhage could also appear this way. Follow-up to radiographic   resolution and/or further evaluation with chest CT if symptoms/findings do not   resolve as expected with treatment, or if indicated otherwise. CT Results  (Last 48 hours)   None     CXR Results  (Last 48 hours)             05/23/22 0621  XR CHEST PORT Final result   Impression:  In the acute setting diffuse bilateral airspace disease is   suggestive of pneumonia, although atypical appearance of edema, ARDS, or   pulmonary hemorrhage could also appear this way. Follow-up to   radiographic   resolution and/or further evaluation with chest CT if symptoms/findings do   not   resolve as expected with treatment, or if indicated otherwise. Narrative:  HISTORY:  sob      TECHNIQUE:  XR CHEST PORT      COMPARISON: None   LIMITATIONS: None      TUBES/LINES: None      LUNG PARENCHYMA: Diffuse airspace disease throughout the lungs. TRACHEA/BRONCHI: Normal   PULMONARY VESSELS: Normal   PLEURA: Normal   HEART: Mild to moderate cardiac enlargement   AORTIC SHADOW:Normal.     MEDIASTINUM: Normal   BONE/SOFT TISSUES: No acute abnormality. OTHER: None            Medical Decision Making and ED Course  I am the first provider for this patient. I reviewed the vital signs, available nursing notes, past medical history, past surgical history, family history and social history. Vital Signs-Reviewed the patient's vital signs. Empty flowsheet group. EKG interpretation: (Preliminary)  Rhythm: sinus tachycardia; and regular . Rate (approx.): 103;  Axis: normal; AL interval: normal; QRS interval: normal ; ST/T wave: non-specific changes; Other findings: abnormal ekg. Records Reviewed: Nursing Notes    Provider Notes (Medical Decision Making): The patient presents with   ED Course:   Initial assessment performed. The patients presenting problems have been discussed, and they are in agreement with the care plan formulated and outlined with them. I have encouraged them to ask questions as they arise throughout their visit. CRITICAL CARE NOTE :  6:52 AM  Amount of Critical Care Time: ____45(minutes)__    IMPENDING DETERIORATION -Airway, Respiratory and Cardiovascular  ASSOCIATED RISK FACTORS - Hypoxia  MANAGEMENT- Bedside Assessment and Supervision of Care  INTERPRETATION -  CT Scan and Cardiac Output Measures   INTERVENTIONS - hemodynamic mngmt  CASE REVIEW -   TREATMENT RESPONSE -Stable  PERFORMED BY - Self    NOTES   :  I have spent critical care time involved in lab review, consultations with specialist, family decision- making, bedside attention and documentation. This time excludes time spent in any separate billed procedures. During this entire length of time I was immediately available to the patient . Magaly Clemente MD        Disposition            DISCHARGE PLAN:  1. There are no discharge medications for this patient. 2. Follow-up Information    None    3. Return to ED if worse     Diagnosis    Clinical Impression: No diagnosis found. Attestations:    Magaly Clemente MD    Please note that this dictation was completed with [employer], the computer voice recognition software. Quite often unanticipated grammatical, syntax, homophones, and other interpretive errors are inadvertently transcribed by the computer software. Please disregard these errors. Please excuse any errors that have escaped final proofreading. Thank you.    ? No past medical history on file. No past surgical history on file. No family history on file.     Social History     Socioeconomic History    Marital status: UNKNOWN Spouse name: Not on file    Number of children: Not on file    Years of education: Not on file    Highest education level: Not on file   Occupational History    Not on file   Tobacco Use    Smoking status: Not on file    Smokeless tobacco: Not on file   Substance and Sexual Activity    Alcohol use: Not on file    Drug use: Not on file    Sexual activity: Not on file   Other Topics Concern    Not on file   Social History Narrative    Not on file     Social Determinants of Health     Financial Resource Strain:     Difficulty of Paying Living Expenses: Not on file   Food Insecurity:     Worried About Running Out of Food in the Last Year: Not on file    Yesenia of Food in the Last Year: Not on file   Transportation Needs:     Lack of Transportation (Medical): Not on file    Lack of Transportation (Non-Medical): Not on file   Physical Activity:     Days of Exercise per Week: Not on file    Minutes of Exercise per Session: Not on file   Stress:     Feeling of Stress : Not on file   Social Connections:     Frequency of Communication with Friends and Family: Not on file    Frequency of Social Gatherings with Friends and Family: Not on file    Attends Sikhism Services: Not on file    Active Member of [address] [place] [address] [email or web address] or Organizations: Not on file    Attends Club or Organization Meetings: Not on file    Marital Status: Not on file   Intimate Partner Violence:     Fear of Current or Ex-Partner: Not on file    Emotionally Abused: Not on file    Physically Abused: Not on file    Sexually Abused: Not on file   Housing Stability:     Unable to Pay for Housing in the Last Year: Not on file    Number of [place] in the Last Year: Not on file    Unstable Housing in the Last Year: Not on file         ALLERGIES: Pcn [penicillins]    Review of Systems   Unable to perform ROS: Acuity of condition   Constitutional: Negative for diaphoresis. Respiratory: Positive for shortness of breath.         Vitals: 05/23/22 0542 05/23/22 0547   BP: 134/80    Pulse: (!) 101    Resp: (!) 40    SpO2: 95% 94%            Physical Exam  Vitals and nursing note reviewed. Constitutional:       General: She is in acute distress. Appearance: Normal appearance. HENT:      Head: Normocephalic and atraumatic. Right Ear: Tympanic membrane and ear canal normal.      Left Ear: Tympanic membrane and ear canal normal.      Nose: Nose normal.      Mouth/Throat:      Mouth: Mucous membranes are moist.      Pharynx: Oropharynx is clear. Eyes:      Extraocular Movements: Extraocular movements intact. Conjunctiva/sclera: Conjunctivae normal.      Pupils: Pupils are equal, round, and reactive to light. Cardiovascular:      Rate and Rhythm: Regular rhythm. Tachycardia present. Pulses: Normal pulses. Heart sounds: Normal heart sounds. Pulmonary:      Effort: Respiratory distress present. Breath sounds: Wheezing present. Abdominal:      General: Abdomen is flat. Bowel sounds are normal.      Palpations: Abdomen is soft. Musculoskeletal:         General: Normal range of motion. Cervical back: Normal range of motion and neck supple. Skin:     General: Skin is warm and dry. Neurological:      General: No focal deficit present. Mental Status: She is alert and oriented to person, place, and time. Mental status is at baseline.       Comments: Right hemiparesis   Psychiatric:         Mood and Affect: Mood normal.         Behavior: Behavior normal.          MDM       Procedures

## 2022-05-23 NOTE — CONSULTS
Pulmonary/ CC Consult    Subjective:   Date of Consultation:  May 23, 2022  Referring Physician: This is an 81years old female was from nursing home with got transferred to hospital and got admitted in ICU for galen failure. She has history of hypertension, GERD, CVA and organic brain syndrome. She is bedbound. It is reported that she was short of breath over the last 24 hours and which got worse in nursing home. Rebreather facemask. She was placed on BiPAP which resulted in improvement in her saturations. Blood gas was done and she was switched to cannula oxygen. She is alert but not interactive. Chest x-ray showed diffuse bilateral airspace disease. She has elevated WBC count of 29,000 with elevated lactic acid level of 8.2. Admitted for sepsis. Patient Active Problem List   Diagnosis Code    [facility] ([facility] J96.90     Past Medical History:   Diagnosis Date    Diabetes ([facility]     Gastrointestinal disorder     Hypertension     Stroke [facility])       History reviewed. No pertinent family history. Social History     Tobacco Use    Smoking status: Never Smoker    Smokeless tobacco: Never Used   Substance Use Topics    Alcohol use: Not on file     History reviewed. No pertinent surgical history. Prior to Admission medications    Medication Sig Start Date End Date Taking? Authorizing Provider   amLODIPine (NORVASC) 10 mg tablet Take 10 mg by mouth daily. Yes Provider, Historical   aspirin delayed-release 81 mg tablet Take 81 mg by mouth daily. Yes Provider, Historical   atenoloL (TENORMIN) 50 mg tablet Take 50 mg by mouth daily. Yes Provider, Historical   latanoprost (XALATAN) 0.005 % ophthalmic solution Administer 1 Drop to both eyes nightly. Yes Provider, Historical   losartan (COZAAR) 50 mg tablet Take 50 mg by mouth daily. Yes Provider, Historical   omeprazole (PRILOSEC) 20 mg capsule Take 20 mg by mouth daily.    Yes Provider, Historical   potassium chloride SR (KLOR-CON 10) 10 mEq tablet Take 20 mEq by mouth daily. Yes Provider, Historical   sertraline (ZOLOFT) 50 mg tablet Take 50 mg by mouth daily. Indications: major depressive disorder   Yes Provider, Historical   acetaminophen (TYLENOL) 500 mg tablet Take 500 mg by mouth every four (4) hours as needed for Pain. Yes Provider, Historical   loperamide (IMODIUM) 2 mg capsule Take 4 mg by mouth as needed for Diarrhea. Give 2 capsules by mouth every hour as needed for diarrhea after each loose stool up to 4 doses   Yes Provider, Historical     Allergies   Allergen Reactions    Pcn [Penicillins] Unknown (comments)        Review of Systems:      Objective:   Blood pressure 125/62, pulse 88, temperature 99.3 °F (37.4 °C), resp. rate 26, height 5' 5\" (1.651 m), weight 75.9 kg (167 lb 5.3 oz), SpO2 91 %. Temp (24hrs), Av.9 °F (37.2 °C), Min:97.8 °F (36.6 °C), Max:100.8 °F (38.2 °C)    CT CHEST WO CONT    (Results Pending)      Chest x-ray results were reviewed showing  In the acute setting diffuse bilateral airspace disease is  suggestive of pneumonia, although atypical appearance of edema, ARDS, or  pulmonary hemorrhage could also appear this way.    Follow-up to radiographic  resolution and/or further evaluation with chest CT if symptoms/findings do not  resolve as expected with treatment, or if indicated otherwise  Data Review:   Current Facility-Administered Medications   Medication Dose Route Frequency    furosemide (LASIX) injection 40 mg  40 mg IntraVENous BID    [START ON 2022] sertraline (ZOLOFT) tablet 50 mg  50 mg Oral DAILY    [START ON 2022] atenoloL (TENORMIN) tablet 50 mg  50 mg Oral DAILY    [START ON 2022] aspirin delayed-release tablet 81 mg  81 mg Oral DAILY    [Held by provider] amLODIPine (NORVASC) tablet 10 mg  10 mg Oral DAILY    sodium chloride (NS) flush 5-40 mL  5-40 mL IntraVENous Q8H    sodium chloride (NS) flush 5-40 mL  5-40 mL IntraVENous PRN    acetaminophen (TYLENOL) tablet 650 mg  650 mg Oral Q6H PRN    Or    acetaminophen (TYLENOL) suppository 650 mg  650 mg Rectal Q6H PRN    polyethylene glycol (MIRALAX) packet 17 g  17 g Oral DAILY PRN    ondansetron (ZOFRAN ODT) tablet 4 mg  4 mg Oral Q8H PRN    Or    ondansetron (ZOFRAN) injection 4 mg  4 mg IntraVENous Q6H PRN    [START ON 5/24/2022] enoxaparin (LOVENOX) injection 30 mg  30 mg SubCUTAneous DAILY    haloperidol lactate (HALDOL) injection 4 mg  4 mg IntraMUSCular Q6H PRN    VANCOMYCIN INFORMATION NOTE   Other Rx Dosing/Monitoring    piperacillin-tazobactam (ZOSYN) 4.5 g in 0.9% sodium chloride (MBP/ADV) 100 mL MBP  4.5 g IntraVENous ONCE    piperacillin-tazobactam (ZOSYN) 3.375 g in 0.9% sodium chloride (MBP/ADV) 100 mL MBP  3.375 g IntraVENous Q12H    [START ON 5/24/2022] Vancomycin random level to be drawn on 5/24/22 at 0400 am.   Other ONCE        Exam:      This is an elderly female who is awake but not interactive. Neck is supple. No cervical lymphadenopathy. Thyroid not large  Chest: Bilateral rhonchi. Currently on BiPAP. Abdomen: Soft, nontender, no visceromegaly  Heart: S1-S2, patient is tachycardic  Neuro: Neurological examination. Patient is moving all her extremities        Recent Results (from the past 24 hour(s))   CBC WITH AUTOMATED DIFF    Collection Time: 05/23/22  5:45 AM   Result Value Ref Range    WBC 29.9 (H) 4.4 - 11.3 K/uL    RBC 4.18 (L) 4.50 - 5.90 M/uL    HGB 12.5 (L) 13.5 - 17.5 g/dL    HCT 38.1 36 - 46 %    MCV 91.1 80 - 100 FL    MCH 29.8 (L) 31 - 34 PG    MCHC 32.7 31.0 - 36.0 g/dL    RDW 14.9 (H) 11.5 - 14.5 %    PLATELET 004 067 - 686 K/uL    MPV 8.0 6.5 - 11.5 FL    NRBC 0.0  WBC    ABSOLUTE NRBC 0.01 K/uL    NEUTROPHILS 91 (H) 42 - 75 %    LYMPHOCYTES 7 (L) 20.5 - 51.1 %    MONOCYTES 2 1.7 - 9.3 %    EOSINOPHILS 0 (L) 0.9 - 2.9 %    BASOPHILS 0 0.0 - 2.5 %    ABS. NEUTROPHILS 27.0 (H) 1.8 - 7.7 K/UL    ABS. LYMPHOCYTES 2.1 1.0 - 4.8 K/UL    ABS.  MONOCYTES 0.7 0.2 - 2.4 K/UL ABS. EOSINOPHILS 0.0 0.0 - 0.7 K/UL    ABS. BASOPHILS 0.0 0.0 - 0.2 K/UL   METABOLIC PANEL, COMPREHENSIVE    Collection Time: 05/23/22  5:45 AM   Result Value Ref Range    Sodium 139 136 - 145 mmol/L    Potassium 3.9 3.5 - 5.1 mmol/L    Chloride 101 97 - 108 mmol/L    CO2 31 21 - 32 mmol/L    Anion gap 7 5 - 15 mmol/L    Glucose 104 (H) 65 - 100 mg/dL    BUN 24 (H) 6 - 20 mg/dL    Creatinine 2.00 (H) 0.55 - 1.02 mg/dL    BUN/Creatinine ratio 12 12 - 20      GFR est AA 29 (L) >60 ml/min/1.73m2    GFR est non-AA 24 (L) >60 ml/min/1.73m2    Calcium 8.8 8.5 - 10.1 mg/dL    Bilirubin, total 1.4 (H) 0.2 - 1.0 mg/dL    AST (SGOT) 117 (H) 15 - 37 U/L    ALT (SGPT) 180 (H) 12 - 78 U/L    Alk.  phosphatase 120 (H) 45 - 117 U/L    Protein, total 6.2 (L) 6.4 - 8.2 g/dL    Albumin 2.7 (L) 3.5 - 5.0 g/dL    Globulin 3.5 2.0 - 4.0 g/dL    A-G Ratio 0.8 (L) 1.1 - 2.2     TROPONIN-HIGH SENSITIVITY    Collection Time: 05/23/22  5:45 AM   Result Value Ref Range    Troponin-High Sensitivity 71 (H) 0 - 51 ng/L   NT-PRO BNP    Collection Time: 05/23/22  5:45 AM   Result Value Ref Range    NT pro-BNP 5,580 (H) <450 pg/mL   EKG, 12 LEAD, INITIAL    Collection Time: 05/23/22  5:49 AM   Result Value Ref Range    Ventricular Rate 103 BPM    Atrial Rate 102 BPM    P-R Interval 140 ms    QRS Duration 70 ms    Q-T Interval 416 ms    QTC Calculation (Bezet) 545 ms    Calculated P Axis 58 degrees    Calculated R Axis 10 degrees    Calculated T Axis 18 degrees    Diagnosis       Sinus tachycardia  Low voltage, precordial leads  Borderline repolarization abnormality  Prolonged QT interval    Confirmed by Herrera Stoll M.D., Victoria Bull ([id number]) on 5/23/2022 8:57:45 AM     COVID-19 WITH INFLUENZA A/B    Collection Time: 05/23/22  6:20 AM   Result Value Ref Range    SARS-CoV-2 by PCR Not Detected Not Detected      Influenza A by PCR Not Detected Not Detected      Influenza B by PCR Not Detected Not Detected     BLOOD GAS, ARTERIAL    Collection Time: 05/23/22  7:11 AM   Result Value Ref Range    pH 7.38 7.35 - 7.45      PCO2 20 (LL) 35 - 45 mmHg    PO2 194 (H) 70 - 100 mmHg    BICARBONATE 12 (L) 22 - 26 mmol/L    BASE DEFICIT 11.2 (H) 0 - 2 mmol/L    O2 METHOD BiPAP      FIO2 70.0 %    PRESSURE SUPPORT 10      IPAP/PIP 15      EPAP/CPAP/PEEP 5      Sample source Arterial      SITE Left Radial      JOHN'S TEST PASS      Carboxy-Hgb 0.3 0 - 5 %    Methemoglobin 0.3 0 - 5 %    tHb 12.5 (L) 13.5 - 17.5 g/dL    Oxyhemoglobin 98.8 95 - 100 %   LACTIC ACID    Collection Time: 05/23/22  7:15 AM   Result Value Ref Range    Lactic acid 8.0 (HH) 0.4 - 2.0 mmol/L   EKG, 12 LEAD, INITIAL    Collection Time: 05/23/22  9:39 AM   Result Value Ref Range    Ventricular Rate 89 BPM    Atrial Rate 89 BPM    P-R Interval 134 ms    QRS Duration 74 ms    Q-T Interval 378 ms    QTC Calculation (Bezet) 459 ms    Calculated P Axis 66 degrees    Calculated R Axis 22 degrees    Calculated T Axis 70 degrees    Diagnosis       Normal sinus rhythm  Nonspecific T wave abnormality  Abnormal ECG  When compared with ECG of 23-MAY-2022 05:49,  PREVIOUS ECG IS PRESENT  Confirmed by DAVE MD, Mardy Gowers ([id number]) on 5/23/2022 2:56:58 PM     LACTIC ACID    Collection Time: 05/23/22  9:49 AM   Result Value Ref Range    Lactic acid 8.2 (HH) 0.4 - 2.0 mmol/L   MRSA SCREEN - PCR (NASAL)    Collection Time: 05/23/22 12:45 PM   Result Value Ref Range    MRSA by PCR, Nasal Not Detected Not Detected             Impression: This is an elderly female who is currently hypoxic and on BiPAP. Admitted because of hypoxic respiratory failure. Chest x-ray showed bilateral airspace disease with elevated WBC count of 29,000 and lactic acid level of 8.2. Plan:   1. Severe sepsis:  Patient has severe sepsis with acute hypoxic respiratory failure. Chest x-ray showing bilateral airspace disease. Have developed aspiration pneumonia causing severe sepsis.   Her lactic acid level is 8.2  Patient is on IV Zosyn and IV vancomycin. Blood cultures are done, will check any growth. Repeat lactic acid level. IV fluids volume resuscitation. 2.  Bilateral pneumonia:  Is on IV Zosyn and vancomycin  3. Acute hypoxic respiratory failure:  Patient is currently on BiPAP. Blood gas was done on 70% FiO2 which showed 7.3 8/20/194/12. Will cut down FiO2. Prognosis is guarded.     Thank you for involving me in the management of the patient  Total of 60 minutes were spent in patient's evaluation and management     Ulisses Ocampo MD  [facility]

## 2022-05-23 NOTE — ED TRIAGE NOTES
Patient from [facility] with Resp Distress. Patient SOB since yesterday. Given Lasix 40 mg extra yesterday. Here with EMS, O2 NRB in use at 15l, sats started at 70% with EMS and increased to 97%, Duoneb given per EMS.

## 2022-05-23 NOTE — PROGRESS NOTES
Vancomycin Dosing Consult  Day # 1 of vancomycin therapy  Consult ordered by [facility] for this 81 y.o. female for indication of upper respiratory infection. Antibiotic regimen: Vancomycin + Zosyn    Temp (24hrs), Av.9 °F (37.2 °C), Min:97.8 °F (36.6 °C), Max:100.8 °F (38.2 °C)    Recent Labs     22  0545   WBC 29.9*     Recent Labs     22  0545   CREA 2.00*   BUN 24*     No results for input(s): CRP in the last 72 hours. No results for input(s): PCT in the last 72 hours. Estimated Creatinine Clearance: 21 mL/min (A) (based on SCr of 2 mg/dL (H)). ml/min  Concomitant nephrotoxic drugs: All Micro Results       Procedure Component Value Units Date/Time    MRSA SCREEN - PCR (NASAL) [[id number]] Collected: 22 1245    Order Status: Completed Specimen: Swab Updated: 22 1256          Cultures:     MRSA Swab: 22 pending     Target range: 400 - 600    Recent level history (3):  Date/Time Dose & Interval Measured Level (mcg/mL) Associated AUC/MARK   22 at 0700 1000 mg X1       Ht Readings from Last 1 Encounters:   22 165.1 cm (65\")     Wt Readings from Last 1 Encounters:   22 75.9 kg (167 lb 5.3 oz)     Ideal body weight: 57 kg (125 lb 10.6 oz)  Adjusted ideal body weight: 64.6 kg (142 lb 5.3 oz)        Assessment/Plan:   Pt got 1gm vancomycin in ED this am at 0700. Pt with NIK, and will pulse dose for now.   A random level has been scheduled for 22 at 0400am.  Antimicrobial stop date TBD

## 2022-05-23 NOTE — Clinical Note
Status[de-identified] INPATIENT [101]   Type of Bed: Remote Telemetry [29]   Cardiac Monitoring Required?: No   Inpatient Hospitalization Certified Necessary for the Following Reasons: 9.  Other (further clarification in H&P documentation)   Admitting Diagnosis: Respiratory failure [facility]) [[id number]]   Admitting Physician: Natalie Lai [[id number]]   Attending Physician: Natalie Lai [[id number]]   Estimated Length of Stay: 5-7 Midnights   Discharge Plan[de-identified] Extended Care Facility (e.g. Adult Home, Nursing Home, etc.)

## 2022-05-23 NOTE — ED PROVIDER NOTES
EMERGENCY DEPARTMENT HISTORY AND PHYSICAL EXAM      Date: 5/23/2022  Patient Name: Donna Mercer      History of Presenting Illness     Chief Complaint   Patient presents with    Transfer Of Care       History Provided By: Patient and EMS    HPI: Donna Mercer, 81 y.o. female with a past medical history significant for HTN, CVA presents to the ED with cc of transfer of care. Found to have PNA and possibly HF exacerbation, given levaquin, albuterol, lasix, vancomycin and placed on BiPAP, transferred here for higher level of care. There are no other complaints, changes, or physical findings at this time. PCP: Latoya Case MD        Past History     Past Medical History:  Past Medical History:   Diagnosis Date    Diabetes ([facility]     Gastrointestinal disorder     Hypertension     Stroke [facility])        Past Surgical History:  History reviewed. No pertinent surgical history. Family History:  History reviewed. No pertinent family history. Social History:  Social History     Tobacco Use    Smoking status: Never Smoker    Smokeless tobacco: Never Used   Substance Use Topics    Alcohol use: Not on file    Drug use: Not on file       Allergies: Allergies   Allergen Reactions    Pcn [Penicillins] Unknown (comments)         Review of Systems   Constitutional: Negative except as in HPI. Eyes: Negative except as in HPI.  ENT: Negative except as in HPI. Cardiovascular: Negative except as in HPI. Respiratory: Negative except as in HPI. Gastrointestinal: Negative except as in HPI. Genitourinary: Negative except as in HPI. Musculoskeletal: Negative except as in HPI. Integumentary: Negative except as in HPI. Neurological: Negative except as in HPI. Psychiatric: Negative except as in HPI. Endocrine: Negative except as in HPI. Hematologic/Lymphatic: Negative except as in HPI. Allergic/Immunologic: Negative except as in HPI.     Physical Exam   Constitutional: Awake and alert, interactive, NAD  Eyes: PERRL, no injection or scleral icterus, no discharge  HEENT: NCAT, neck supple, MMM, no oropharyngeal exudates  CV: RRR, no m/r/g  Respiratory: Tachypneic, saturating 92% on BiPAP  GI: Abd soft, nondistended, nontender  : Deferred  Skin: No rashes  Neuro: CN2-12 intact, symmetric facies, fluent speech. Lab and Diagnostic Study Results     Labs -     Recent Results (from the past 12 hour(s))   CBC WITH AUTOMATED DIFF    Collection Time: 05/23/22  5:45 AM   Result Value Ref Range    WBC 29.9 (H) 4.4 - 11.3 K/uL    RBC 4.18 (L) 4.50 - 5.90 M/uL    HGB 12.5 (L) 13.5 - 17.5 g/dL    HCT 38.1 36 - 46 %    MCV 91.1 80 - 100 FL    MCH 29.8 (L) 31 - 34 PG    MCHC 32.7 31.0 - 36.0 g/dL    RDW 14.9 (H) 11.5 - 14.5 %    PLATELET 083 460 - 948 K/uL    MPV 8.0 6.5 - 11.5 FL    NRBC 0.0  WBC    ABSOLUTE NRBC 0.01 K/uL    NEUTROPHILS 91 (H) 42 - 75 %    LYMPHOCYTES 7 (L) 20.5 - 51.1 %    MONOCYTES 2 1.7 - 9.3 %    EOSINOPHILS 0 (L) 0.9 - 2.9 %    BASOPHILS 0 0.0 - 2.5 %    ABS. NEUTROPHILS 27.0 (H) 1.8 - 7.7 K/UL    ABS. LYMPHOCYTES 2.1 1.0 - 4.8 K/UL    ABS. MONOCYTES 0.7 0.2 - 2.4 K/UL    ABS. EOSINOPHILS 0.0 0.0 - 0.7 K/UL    ABS. BASOPHILS 0.0 0.0 - 0.2 K/UL   METABOLIC PANEL, COMPREHENSIVE    Collection Time: 05/23/22  5:45 AM   Result Value Ref Range    Sodium 139 136 - 145 mmol/L    Potassium 3.9 3.5 - 5.1 mmol/L    Chloride 101 97 - 108 mmol/L    CO2 31 21 - 32 mmol/L    Anion gap 7 5 - 15 mmol/L    Glucose 104 (H) 65 - 100 mg/dL    BUN 24 (H) 6 - 20 mg/dL    Creatinine 2.00 (H) 0.55 - 1.02 mg/dL    BUN/Creatinine ratio 12 12 - 20      GFR est AA 29 (L) >60 ml/min/1.73m2    GFR est non-AA 24 (L) >60 ml/min/1.73m2    Calcium 8.8 8.5 - 10.1 mg/dL    Bilirubin, total 1.4 (H) 0.2 - 1.0 mg/dL    AST (SGOT) 117 (H) 15 - 37 U/L    ALT (SGPT) 180 (H) 12 - 78 U/L    Alk.  phosphatase 120 (H) 45 - 117 U/L    Protein, total 6.2 (L) 6.4 - 8.2 g/dL    Albumin 2.7 (L) 3.5 - 5.0 g/dL    Globulin 3.5 2.0 - 4.0 g/dL    A-G Ratio 0.8 (L) 1.1 - 2.2     TROPONIN-HIGH SENSITIVITY    Collection Time: 05/23/22  5:45 AM   Result Value Ref Range    Troponin-High Sensitivity 71 (H) 0 - 51 ng/L   NT-PRO BNP    Collection Time: 05/23/22  5:45 AM   Result Value Ref Range    NT pro-BNP 5,580 (H) <450 pg/mL   EKG, 12 LEAD, INITIAL    Collection Time: 05/23/22  5:49 AM   Result Value Ref Range    Ventricular Rate 103 BPM    Atrial Rate 102 BPM    P-R Interval 140 ms    QRS Duration 70 ms    Q-T Interval 416 ms    QTC Calculation (Bezet) 545 ms    Calculated P Axis 58 degrees    Calculated R Axis 10 degrees    Calculated T Axis 18 degrees    Diagnosis       Sinus tachycardia  Low voltage, precordial leads  Borderline repolarization abnormality  Prolonged QT interval    Confirmed by Herrera Stoll M.D., Victoria Bull ([id number]) on 5/23/2022 8:57:45 AM     COVID-19 WITH INFLUENZA A/B    Collection Time: 05/23/22  6:20 AM   Result Value Ref Range    SARS-CoV-2 by PCR Not Detected Not Detected      Influenza A by PCR Not Detected Not Detected      Influenza B by PCR Not Detected Not Detected     BLOOD GAS, ARTERIAL    Collection Time: 05/23/22  7:11 AM   Result Value Ref Range    pH 7.38 7.35 - 7.45      PCO2 20 (LL) 35 - 45 mmHg    PO2 194 (H) 70 - 100 mmHg    BICARBONATE 12 (L) 22 - 26 mmol/L    BASE DEFICIT 11.2 (H) 0 - 2 mmol/L    O2 METHOD BiPAP      FIO2 70.0 %    PRESSURE SUPPORT 10      IPAP/PIP 15      EPAP/CPAP/PEEP 5      Sample source Arterial      SITE Left Radial      JOHN'S TEST PASS      Carboxy-Hgb 0.3 0 - 5 %    Methemoglobin 0.3 0 - 5 %    tHb 12.5 (L) 13.5 - 17.5 g/dL    Oxyhemoglobin 98.8 95 - 100 %   LACTIC ACID    Collection Time: 05/23/22  7:15 AM   Result Value Ref Range    Lactic acid 8.0 (HH) 0.4 - 2.0 mmol/L       Radiologic Studies -   [unfilled]  CT Results  (Last 48 hours)    None        CXR Results  (Last 48 hours)               05/23/22 0621  XR CHEST PORT Final result    Impression:  In the acute setting diffuse bilateral airspace disease is   suggestive of pneumonia, although atypical appearance of edema, ARDS, or   pulmonary hemorrhage could also appear this way. Follow-up to radiographic   resolution and/or further evaluation with chest CT if symptoms/findings do not   resolve as expected with treatment, or if indicated otherwise. Narrative:  HISTORY:  sob       TECHNIQUE:  XR CHEST PORT       COMPARISON: None   LIMITATIONS: None       TUBES/LINES: None       LUNG PARENCHYMA: Diffuse airspace disease throughout the lungs. TRACHEA/BRONCHI: Normal   PULMONARY VESSELS: Normal   PLEURA: Normal   HEART: Mild to moderate cardiac enlargement   AORTIC SHADOW:Normal.     MEDIASTINUM: Normal   BONE/SOFT TISSUES: No acute abnormality. OTHER: None                 Medical Decision Making and ED Course   - I am the first and primary provider for this patient AND AM THE PRIMARY PROVIDER OF RECORD. - I reviewed the vital signs, available nursing notes, past medical history, past surgical history, family history and social history. - Initial assessment performed. The patients presenting problems have been discussed, and the staff are in agreement with the care plan formulated and outlined with them. I have encouraged them to ask questions as they arise throughout their visit. Vital Signs-Reviewed the patient's vital signs. Patient Vitals for the past 12 hrs:   Temp Pulse Resp BP SpO2   05/23/22 0943 -- -- -- -- 93 %   05/23/22 0940 97.8 °F (36.6 °C) -- -- -- --   05/23/22 0933 -- 91 26 126/77 93 %       EKG interpretation: [email or web address], nl QRS/QTc/axis, no LAWRENCE/STD or nonanatomic TWI. BSUS: B-lines more in R than left        Provider Notes (Medical Decision Making):   86F w/PNA possibly ARDS vs. CHF. BSUS w/B-lines and pro-BNP ~5k, will leave on BiPAP for now and admit to ICU. ED Course:       ED Course as of 05/23/22 0951   Mon May 23, 2022   0915 Admitted to Dr. Rivera Quesada.  [YA]      ED Course User Index  [YA] Kayla Jesus Stewart MD         Consultations:     Hospitalist/Intensivist Dr. Leticia Landa. Disposition     Disposition: Admitted to ICU Medical ICU the case was discussed with the admitting physician Dr. Leticia Landa. Admitted      Diagnosis     Clinical Impression:   1. SOB (shortness of breath)        Attestations:     Briana Rodriguez MD

## 2022-05-24 ENCOUNTER — APPOINTMENT (OUTPATIENT)
Dept: CT IMAGING | Age: 87
DRG: 871 | End: 2022-05-24
Attending: HOSPITALIST
Payer: MEDICARE

## 2022-05-24 ENCOUNTER — APPOINTMENT (OUTPATIENT)
Dept: GENERAL RADIOLOGY | Age: 87
DRG: 871 | End: 2022-05-24
Attending: INTERNAL MEDICINE
Payer: MEDICARE

## 2022-05-24 LAB
ALBUMIN SERPL-MCNC: 2.5 G/DL (ref 3.5–5)
ALBUMIN/GLOB SERPL: 0.5 {RATIO} (ref 1.1–2.2)
ALP SERPL-CCNC: 111 U/L (ref 45–117)
ALT SERPL-CCNC: 11 U/L (ref 12–78)
ANION GAP SERPL CALC-SCNC: 6 MMOL/L (ref 5–15)
ARTERIAL PATENCY WRIST A: ABNORMAL
AST SERPL W P-5'-P-CCNC: 26 U/L (ref 15–37)
BASE DEFICIT BLDA-SCNC: 3.4 MMOL/L (ref 0–2)
BASOPHILS # BLD: 0 K/UL (ref 0–0.1)
BASOPHILS NFR BLD: 0 % (ref 0–1)
BDY SITE: ABNORMAL
BILIRUB SERPL-MCNC: 0.6 MG/DL (ref 0.2–1)
BUN SERPL-MCNC: 26 MG/DL (ref 6–20)
BUN/CREAT SERPL: 24 (ref 12–20)
CA-I BLD-MCNC: 8 MG/DL (ref 8.5–10.1)
CHLORIDE SERPL-SCNC: 113 MMOL/L (ref 97–108)
CO2 SERPL-SCNC: 23 MMOL/L (ref 21–32)
CREAT SERPL-MCNC: 1.1 MG/DL (ref 0.55–1.02)
DIFFERENTIAL METHOD BLD: ABNORMAL
ECHO AO ROOT DIAM: 2.7 CM
ECHO AO ROOT INDEX: 1.48 CM/M2
ECHO AV AREA PEAK VELOCITY: 2.1 CM2
ECHO AV AREA/BSA PEAK VELOCITY: 1.1 CM2/M2
ECHO AV PEAK GRADIENT: 11 MMHG
ECHO AV PEAK VELOCITY: 1.7 M/S
ECHO AV VELOCITY RATIO: 0.82
ECHO EST RA PRESSURE: 3 MMHG
ECHO LA AREA 4C: 13.1 CM2
ECHO LA DIAMETER INDEX: 1.58 CM/M2
ECHO LA DIAMETER: 2.9 CM
ECHO LA MAJOR AXIS: 5 CM
ECHO LA TO AORTIC ROOT RATIO: 1.07
ECHO LV E' LATERAL VELOCITY: 8 CM/S
ECHO LV E' SEPTAL VELOCITY: 7 CM/S
ECHO LV EJECTION FRACTION BIPLANE: 62 % (ref 55–100)
ECHO LV FRACTIONAL SHORTENING: 33 % (ref 28–44)
ECHO LV INTERNAL DIMENSION DIASTOLE INDEX: 2.35 CM/M2
ECHO LV INTERNAL DIMENSION DIASTOLIC: 4.3 CM (ref 3.9–5.3)
ECHO LV INTERNAL DIMENSION SYSTOLIC INDEX: 1.58 CM/M2
ECHO LV INTERNAL DIMENSION SYSTOLIC: 2.9 CM
ECHO LV IVSD: 0.9 CM (ref 0.6–0.9)
ECHO LV MASS 2D: 105.3 G (ref 67–162)
ECHO LV MASS INDEX 2D: 57.6 G/M2 (ref 43–95)
ECHO LV POSTERIOR WALL DIASTOLIC: 0.7 CM (ref 0.6–0.9)
ECHO LV RELATIVE WALL THICKNESS RATIO: 0.33
ECHO LVOT AREA: 2.5 CM2
ECHO LVOT DIAM: 1.8 CM
ECHO LVOT PEAK GRADIENT: 7 MMHG
ECHO LVOT PEAK VELOCITY: 1.4 M/S
ECHO MV A VELOCITY: 1.19 M/S
ECHO MV E DECELERATION TIME (DT): 176 MS
ECHO MV E VELOCITY: 0.84 M/S
ECHO MV E/A RATIO: 0.71
ECHO MV E/E' LATERAL: 10.5
ECHO MV E/E' RATIO (AVERAGED): 11.25
ECHO MV E/E' SEPTAL: 12
ECHO MV MAX VELOCITY: 1.2 M/S
ECHO MV MEAN GRADIENT: 2 MMHG
ECHO MV MEAN VELOCITY: 0.7 M/S
ECHO MV PEAK GRADIENT: 6 MMHG
ECHO MV REGURGITANT PEAK GRADIENT: 92 MMHG
ECHO MV REGURGITANT PEAK VELOCITY: 4.8 M/S
ECHO MV VTI: 29.8 CM
ECHO PVEIN A DURATION: 113 MS
ECHO PVEIN A VELOCITY: 0.5 M/S
ECHO RIGHT VENTRICULAR SYSTOLIC PRESSURE (RVSP): 46 MMHG
ECHO TV REGURGITANT MAX VELOCITY: 3.29 M/S
ECHO TV REGURGITANT PEAK GRADIENT: 43 MMHG
EOSINOPHIL # BLD: 0 K/UL (ref 0–0.4)
EOSINOPHIL NFR BLD: 0 % (ref 0–7)
EPAP/CPAP/PEEP, PAPEEP: 0
ERYTHROCYTE [DISTWIDTH] IN BLOOD BY AUTOMATED COUNT: 14.4 % (ref 11.5–14.5)
FIO2 ON VENT: 50 %
GAS FLOW.O2 O2 DELIVERY SYS: 50 L/MIN
GLOBULIN SER CALC-MCNC: 4.6 G/DL (ref 2–4)
GLUCOSE SERPL-MCNC: 125 MG/DL (ref 65–100)
HCO3 BLDA-SCNC: 22 MMOL/L (ref 22–26)
HCT VFR BLD AUTO: 36.5 % (ref 35–47)
HGB BLD-MCNC: 12.3 G/DL (ref 11.5–16)
IMM GRANULOCYTES # BLD AUTO: 0.3 K/UL (ref 0–0.04)
IMM GRANULOCYTES NFR BLD AUTO: 1 % (ref 0–0.5)
LACTATE SERPL-SCNC: 2 MMOL/L (ref 0.4–2)
LYMPHOCYTES # BLD: 0.8 K/UL (ref 0.8–3.5)
LYMPHOCYTES NFR BLD: 4 % (ref 12–49)
MCH RBC QN AUTO: 29.7 PG (ref 26–34)
MCHC RBC AUTO-ENTMCNC: 33.7 G/DL (ref 30–36.5)
MCV RBC AUTO: 88.2 FL (ref 80–99)
MONOCYTES # BLD: 0.4 K/UL (ref 0–1)
MONOCYTES NFR BLD: 2 % (ref 5–13)
NEUTS SEG # BLD: 21.5 K/UL (ref 1.8–8)
NEUTS SEG NFR BLD: 93 % (ref 32–75)
NRBC # BLD: 0 K/UL (ref 0–0.01)
NRBC BLD-RTO: 0 PER 100 WBC
PCO2 BLDA: 33 MMHG (ref 35–45)
PH BLDA: 7.4 [PH] (ref 7.35–7.45)
PLATELET # BLD AUTO: 270 K/UL (ref 150–400)
PMV BLD AUTO: 9.9 FL (ref 8.9–12.9)
PO2 BLDA: 86 MMHG (ref 75–100)
POTASSIUM SERPL-SCNC: 3.3 MMOL/L (ref 3.5–5.1)
PROT SERPL-MCNC: 7.1 G/DL (ref 6.4–8.2)
RBC # BLD AUTO: 4.14 M/UL (ref 3.8–5.2)
SAO2 % BLD: 98 %
SAO2% DEVICE SAO2% SENSOR NAME: ABNORMAL
SODIUM SERPL-SCNC: 142 MMOL/L (ref 136–145)
TROPONIN-HIGH SENSITIVITY: 34 NG/L (ref 0–51)
VANCOMYCIN SERPL-MCNC: 5.4 UG/ML
WBC # BLD AUTO: 23 K/UL (ref 3.6–11)

## 2022-05-24 PROCEDURE — 74011000258 HC RX REV CODE- 258: Performed by: INTERNAL MEDICINE

## 2022-05-24 PROCEDURE — 84484 ASSAY OF TROPONIN QUANT: CPT

## 2022-05-24 PROCEDURE — 74011250636 HC RX REV CODE- 250/636: Performed by: HOSPITALIST

## 2022-05-24 PROCEDURE — 80053 COMPREHEN METABOLIC PANEL: CPT

## 2022-05-24 PROCEDURE — 74011250637 HC RX REV CODE- 250/637: Performed by: HOSPITALIST

## 2022-05-24 PROCEDURE — 74011000250 HC RX REV CODE- 250: Performed by: HOSPITALIST

## 2022-05-24 PROCEDURE — 80202 ASSAY OF VANCOMYCIN: CPT

## 2022-05-24 PROCEDURE — 74011250636 HC RX REV CODE- 250/636: Performed by: INTERNAL MEDICINE

## 2022-05-24 PROCEDURE — 85025 COMPLETE CBC W/AUTO DIFF WBC: CPT

## 2022-05-24 PROCEDURE — 74011000258 HC RX REV CODE- 258: Performed by: HOSPITALIST

## 2022-05-24 PROCEDURE — 71045 X-RAY EXAM CHEST 1 VIEW: CPT

## 2022-05-24 PROCEDURE — 82803 BLOOD GASES ANY COMBINATION: CPT

## 2022-05-24 PROCEDURE — 36415 COLL VENOUS BLD VENIPUNCTURE: CPT

## 2022-05-24 PROCEDURE — 83605 ASSAY OF LACTIC ACID: CPT

## 2022-05-24 PROCEDURE — 77010033711 HC HIGH FLOW OXYGEN

## 2022-05-24 PROCEDURE — 65610000006 HC RM INTENSIVE CARE

## 2022-05-24 PROCEDURE — 71250 CT THORAX DX C-: CPT

## 2022-05-24 RX ADMIN — ASPIRIN 81 MG: 81 TABLET, COATED ORAL at 10:42

## 2022-05-24 RX ADMIN — VANCOMYCIN HYDROCHLORIDE 1250 MG: 1.25 INJECTION, POWDER, LYOPHILIZED, FOR SOLUTION INTRAVENOUS at 11:56

## 2022-05-24 RX ADMIN — SODIUM CHLORIDE, PRESERVATIVE FREE 10 ML: 5 INJECTION INTRAVENOUS at 06:42

## 2022-05-24 RX ADMIN — SODIUM CHLORIDE, PRESERVATIVE FREE 10 ML: 5 INJECTION INTRAVENOUS at 21:10

## 2022-05-24 RX ADMIN — PIPERACILLIN AND TAZOBACTAM 3.38 G: 3; .375 INJECTION, POWDER, LYOPHILIZED, FOR SOLUTION INTRAVENOUS at 17:38

## 2022-05-24 RX ADMIN — SODIUM CHLORIDE, PRESERVATIVE FREE 10 ML: 5 INJECTION INTRAVENOUS at 14:00

## 2022-05-24 RX ADMIN — PIPERACILLIN AND TAZOBACTAM 3.38 G: 3; .375 INJECTION, POWDER, LYOPHILIZED, FOR SOLUTION INTRAVENOUS at 10:24

## 2022-05-24 RX ADMIN — FUROSEMIDE 40 MG: 10 INJECTION, SOLUTION INTRAMUSCULAR; INTRAVENOUS at 10:24

## 2022-05-24 RX ADMIN — FUROSEMIDE 40 MG: 10 INJECTION, SOLUTION INTRAMUSCULAR; INTRAVENOUS at 21:10

## 2022-05-24 RX ADMIN — SERTRALINE HYDROCHLORIDE 50 MG: 50 TABLET ORAL at 10:42

## 2022-05-24 RX ADMIN — ENOXAPARIN SODIUM 30 MG: 100 INJECTION SUBCUTANEOUS at 10:24

## 2022-05-24 NOTE — PROGRESS NOTES
Pulmonary/ CC Consult  This is an 81years old female was from nursing home with got transferred to hospital and got admitted in ICU for respiratory failure. She has history of hypertension, GERD, CVA and organic brain syndrome. She is bedbound. It is reported that she was short of breath over the last 24 hours and which got worse in nursing home. Rebreather facemask. She was placed on BiPAP which resulted in improvement in her saturations. Her chest x-ray showed bilateral infiltrates. Subjective:     Patient is evaluated at bedside. Shown significant improvement in respiratory distress. On high flow nasal cannula oxygen. Alert but not interactive because of her organic brain syndrome      Patient Active Problem List   Diagnosis Code    Respiratory failure ([facility] J96.90     Past Medical History:   Diagnosis Date    Diabetes ([facility]     Gastrointestinal disorder     Hypertension     Stroke [facility])       History reviewed. No pertinent family history. Social History     Tobacco Use    Smoking status: Never Smoker    Smokeless tobacco: Never Used   Substance Use Topics    Alcohol use: Not on file     History reviewed. No pertinent surgical history. Prior to Admission medications    Medication Sig Start Date End Date Taking? Authorizing Provider   amLODIPine (NORVASC) 10 mg tablet Take 10 mg by mouth daily. Yes Provider, Historical   aspirin delayed-release 81 mg tablet Take 81 mg by mouth daily. Yes Provider, Historical   atenoloL (TENORMIN) 50 mg tablet Take 50 mg by mouth daily. Yes Provider, Historical   latanoprost (XALATAN) 0.005 % ophthalmic solution Administer 1 Drop to both eyes nightly. Yes Provider, Historical   losartan (COZAAR) 50 mg tablet Take 50 mg by mouth daily. Yes Provider, Historical   omeprazole (PRILOSEC) 20 mg capsule Take 20 mg by mouth daily. Yes Provider, Historical   potassium chloride SR (KLOR-CON 10) 10 mEq tablet Take 20 mEq by mouth daily.    Yes Provider, Historical   sertraline (ZOLOFT) 50 mg tablet Take 50 mg by mouth daily. Indications: major depressive disorder   Yes Provider, Historical   acetaminophen (TYLENOL) 500 mg tablet Take 500 mg by mouth every four (4) hours as needed for Pain. Yes Provider, Historical   loperamide (IMODIUM) 2 mg capsule Take 4 mg by mouth as needed for Diarrhea. Give 2 capsules by mouth every hour as needed for diarrhea after each loose stool up to 4 doses   Yes Provider, Historical     Allergies   Allergen Reactions    Pcn [Penicillins] Unknown (comments)        Review of Systems:      Objective:   Blood pressure 109/70, pulse 68, temperature 98.1 °F (36.7 °C), resp. rate (!) 33, height 5' 5\" (1.651 m), weight 75.9 kg (167 lb 5.3 oz), SpO2 97 %. Temp (24hrs), Av °F (37.2 °C), Min:98.1 °F (36.7 °C), Max:99.5 °F (37.5 °C)    CT CHEST WO CONT    (Results Pending)   XR CHEST PORT    (Results Pending)   XR CHEST PORT    (Results Pending)   XR CHEST PORT    (Results Pending)   XR CHEST PORT    (Results Pending)      Chest x-ray results were reviewed showing  In the acute setting diffuse bilateral airspace disease suggestive of pneumonia, although atypical appearance of edema, ARDS, or  pulmonary hemorrhage could also appear this way.        Data Review:   Current Facility-Administered Medications   Medication Dose Route Frequency    vancomycin (VANCOCIN) 1,250 mg in 0.9% sodium chloride 250 mL ([id number])  1,250 mg IntraVENous ONCE    [START ON 2022] VANCOMYCIN RANDOM LAB REMINDER   Other ONCE    furosemide (LASIX) injection 40 mg  40 mg IntraVENous BID    sertraline (ZOLOFT) tablet 50 mg  50 mg Oral DAILY    atenoloL (TENORMIN) tablet 50 mg  50 mg Oral DAILY    aspirin delayed-release tablet 81 mg  81 mg Oral DAILY    [Held by provider] amLODIPine (NORVASC) tablet 10 mg  10 mg Oral DAILY    sodium chloride (NS) flush 5-40 mL  5-40 mL IntraVENous Q8H    sodium chloride (NS) flush 5-40 mL  5-40 mL IntraVENous PRN    acetaminophen (TYLENOL) tablet 650 mg  650 mg Oral Q6H PRN    Or    acetaminophen (TYLENOL) suppository 650 mg  650 mg Rectal Q6H PRN    polyethylene glycol (MIRALAX) packet 17 g  17 g Oral DAILY PRN    ondansetron (ZOFRAN ODT) tablet 4 mg  4 mg Oral Q8H PRN    Or    ondansetron (ZOFRAN) injection 4 mg  4 mg IntraVENous Q6H PRN    enoxaparin (LOVENOX) injection 30 mg  30 mg SubCUTAneous DAILY    haloperidol lactate (HALDOL) injection 4 mg  4 mg IntraMUSCular Q6H PRN    VANCOMYCIN INFORMATION NOTE   Other Rx Dosing/Monitoring    piperacillin-tazobactam (ZOSYN) 3.375 g in 0.9% sodium chloride (MBP/ADV) 100 mL MBP  3.375 g IntraVENous Q12H        Exam:      This is an elderly female who is awake but not interactive. Neck is supple. No cervical lymphadenopathy. Thyroid not large  Chest: Bilateral rhonchi. Currently on BiPAP. Abdomen: Soft, nontender, no visceromegaly  Heart: S1-S2, patient is tachycardic  Neuro: Neurological examination.   Patient is moving all her extremities        Recent Results (from the past 24 hour(s))   MRSA SCREEN - PCR (NASAL)    Collection Time: 05/23/22 12:45 PM   Result Value Ref Range    MRSA by PCR, Nasal Not Detected Not Detected     ECHO ADULT COMPLETE    Collection Time: 05/23/22  3:37 PM   Result Value Ref Range    LA Major [place] 5.0 cm    LA Area 4C 13.1 cm2    LA Diameter 2.9 cm    AV Peak Velocity 1.7 m/s    AV Peak Gradient 11 mmHg    AV Area by Peak Velocity 2.1 cm2    Aortic Root 2.7 cm    IVSd 0.9 0.6 - 0.9 cm    LVIDd 4.3 3.9 - 5.3 cm    LVIDs 2.9 cm    LVOT Diameter 1.8 cm    LVOT Peak Velocity 1.4 m/s    LVOT Peak Gradient 7 mmHg    LVPWd 0.7 0.6 - 0.9 cm    LV E' Lateral Velocity 8 cm/s    LV E' Septal Velocity 7 cm/s    LVOT Area 2.5 cm2    MR Peak Velocity 4.8 m/s    MR Peak Gradient 92 mmHg    MV Max Velocity 1.2 m/s    MV Peak Gradient 6 mmHg    MV E Wave Deceleration Time 176.0 ms    MV A Velocity 1.19 m/s    MV E Velocity 0.84 m/s    MV Mean Gradient 2 mmHg MV VTI 29.8 cm    MV Mean Velocity 0.7 m/s    Pulm Vein A Duration 113.0 ms    Pulm Vein A Velocity 0.5 m/s    Est. RA Pressure 3 mmHg    TR Max Velocity 3.29 m/s    TR Peak Gradient 43 mmHg    Fractional Shortening 2D 33 28 - 44 %    LVIDd Index 2.35 cm/m2    LVIDs Index 1.58 cm/m2    LV RWT Ratio 0.33     LV Mass 2D 105.3 67 - 162 g    LV Mass 2D Index 57.6 43 - 95 g/m2    MV E/A 0.71     E/E' Ratio (Averaged) 11.25     E/E' Lateral 10.50     E/E' Septal 12.00     LA Size Index 1.58 cm/m2    LA/AO Root Ratio 1.07     Ao Root Index 1.48 cm/m2    AV Velocity Ratio 0.82     MONIQUE/BSA Peak Velocity 1.1 cm2/m2    RVSP 46 mmHg    EF BP 62 55 - 100 %   LACTIC ACID    Collection Time: 05/23/22  4:55 PM   Result Value Ref Range    Lactic acid 5.5 (HH) 0.4 - 2.0 mmol/L   LACTIC ACID    Collection Time: 05/23/22  8:52 PM   Result Value Ref Range    Lactic acid 3.1 (HH) 0.4 - 2.0 mmol/L   LACTIC ACID    Collection Time: 05/24/22  3:10 AM   Result Value Ref Range    Lactic acid 2.0 0.4 - 2.0 mmol/L   CBC WITH AUTOMATED DIFF    Collection Time: 05/24/22  4:00 AM   Result Value Ref Range    WBC 23.0 (H) 3.6 - 11.0 K/uL    RBC 4.14 3.80 - 5.20 M/uL    HGB 12.3 11.5 - 16.0 g/dL    HCT 36.5 35.0 - 47.0 %    MCV 88.2 80.0 - 99.0 FL    MCH 29.7 26.0 - 34.0 PG    MCHC 33.7 30.0 - 36.5 g/dL    RDW 14.4 11.5 - 14.5 %    PLATELET 033 929 - 340 K/uL    MPV 9.9 8.9 - 12.9 FL    NRBC 0.0 0.0  WBC    ABSOLUTE NRBC 0.00 0.00 - 0.01 K/uL    NEUTROPHILS 93 (H) 32 - 75 %    LYMPHOCYTES 4 (L) 12 - 49 %    MONOCYTES 2 (L) 5 - 13 %    EOSINOPHILS 0 0 - 7 %    BASOPHILS 0 0 - 1 %    IMMATURE GRANULOCYTES 1 (H) 0 - 0.5 %    ABS. NEUTROPHILS 21.5 (H) 1.8 - 8.0 K/UL    ABS. LYMPHOCYTES 0.8 0.8 - 3.5 K/UL    ABS. MONOCYTES 0.4 0.0 - 1.0 K/UL    ABS. EOSINOPHILS 0.0 0.0 - 0.4 K/UL    ABS. BASOPHILS 0.0 0.0 - 0.1 K/UL    ABS. IMM.  GRANS. 0.3 (H) 0.00 - 0.04 K/UL    DF AUTOMATED     METABOLIC PANEL, COMPREHENSIVE    Collection Time: 05/24/22  4:00 AM   Result Value Ref Range    Sodium 142 136 - 145 mmol/L    Potassium 3.3 (L) 3.5 - 5.1 mmol/L    Chloride 113 (H) 97 - 108 mmol/L    CO2 23 21 - 32 mmol/L    Anion gap 6 5 - 15 mmol/L    Glucose 125 (H) 65 - 100 mg/dL    BUN 26 (H) 6 - 20 mg/dL    Creatinine 1.10 (H) 0.55 - 1.02 mg/dL    BUN/Creatinine ratio 24 (H) 12 - 20      GFR est AA 57 (L) >60 ml/min/1.73m2    GFR est non-AA 47 (L) >60 ml/min/1.73m2    Calcium 8.0 (L) 8.5 - 10.1 mg/dL    Bilirubin, total 0.6 0.2 - 1.0 mg/dL    AST (SGOT) 26 15 - 37 U/L    ALT (SGPT) 11 (L) 12 - 78 U/L    Alk. phosphatase 111 45 - 117 U/L    Protein, total 7.1 6.4 - 8.2 g/dL    Albumin 2.5 (L) 3.5 - 5.0 g/dL    Globulin 4.6 (H) 2.0 - 4.0 g/dL    A-G Ratio 0.5 (L) 1.1 - 2.2     TROPONIN-HIGH SENSITIVITY    Collection Time: 05/24/22  4:00 AM   Result Value Ref Range    Troponin-High Sensitivity 34 0 - 51 ng/L   VANCOMYCIN, RANDOM    Collection Time: 05/24/22  4:00 AM   Result Value Ref Range    Vancomycin, random 5.4 ug/mL   BLOOD GAS, ARTERIAL    Collection Time: 05/24/22  4:00 AM   Result Value Ref Range    pH 7.40 7.35 - 7.45      PCO2 33 (L) 35 - 45 mmHg    PO2 86 75 - 100 mmHg    O2 SAT 98 >95 %    BICARBONATE 22 22 - 26 mmol/L    BASE DEFICIT 3.4 (H) 0 - 2 mmol/L    O2 METHOD High Flow O2      O2 FLOW RATE 50.0 L/min    FIO2 50.0 %    EPAP/CPAP/PEEP 0      SITE Arterial Line      JOHN'S TEST PASS             Impression: This is an elderly female who is currently hypoxic and on BiPAP. Admitted because of hypoxic respiratory failure. Chest x-ray showed bilateral airspace disease with elevated WBC count of 29,000 and lactic acid level of 8.2. Plan:   1. Severe sepsis:  Patient has severe sepsis with acute hypoxic respiratory failure. Chest x-ray showing bilateral airspace disease. Have developed aspiration pneumonia causing severe sepsis. It has shown improvement. Lactic acid level is down to 2.0.   Her lactic acid level initially was 8.2  Patient is on IV Zosyn and IV vancomycin. Blood cultures are done, will check any growth. IV fluids volume resuscitation. 2.  Bilateral pneumonia:  Is on IV Zosyn and vancomycin  WBC is coming down  3. Acute hypoxic respiratory failure:  ABG this morning showed 7.4 0/33/86/20/90 8%  Patient was on BiPAP and now switched to high flow nasal cannula oxygen at 50 L with 50%. I will cut down FiO2 to 40%. Prognosis is guarded.     Thank you for involving me in the management of the patient  Total of 50 minutes were spent in patient's evaluation and management     Jesus An MD  [facility]

## 2022-05-24 NOTE — PROGRESS NOTES
Progress Note      5/24/2022 8:12 AM  NAME: Joycelyn Apley   MRN:  [id number]   Admit Diagnosis: [facility] [J96.90]      Problem List:   1. Incomplete database secondary to patient being a poor historian  2. Patient presents for evaluation of shortness of breath  3. Acute hypoxic respiratory failure requiring BiPAP on admission  4. Bilateral aspiration pneumonia  5. Sepsis  6. Grade I (mild) diastolic dysfunction, or impaired relaxation  7. Mild to moderate pulmonary hypertension (RVSP = 44 mmHg)  8. Previous cerebrovascular accident (CVA)  9. Hypertension  10. Type 2 diabetes    11. Elevated cardiac enzymes in the indeterminate range  12. Elevated BNP (5580 pg/mL)  13. Chronic kidney disease  14. Gastroesophageal reflux disease (GERD)  15. Debility  16. Transaminitis  17. Leukocytosis  18. Anemia       Subjective: The patient is seen and examined in coronary care unit (CCU) room 286. There were no acute cardiovascular events reported overnight. Currently, he is examined receiving supplemental oxygen via high flow nasal cannula. She denies any vascular complaints. Specifically, she denies chest pain or shortness of breath. She is made aware of her echocardiographic results revealing mild diastolic dysfunction, and mild to moderate pulmonary hypertension.   Medications Personally Reviewed:    Current Facility-Administered Medications   Medication Dose Route Frequency    furosemide (LASIX) injection 40 mg  40 mg IntraVENous BID    sertraline (ZOLOFT) tablet 50 mg  50 mg Oral DAILY    atenoloL (TENORMIN) tablet 50 mg  50 mg Oral DAILY    aspirin delayed-release tablet 81 mg  81 mg Oral DAILY    [Held by provider] amLODIPine (NORVASC) tablet 10 mg  10 mg Oral DAILY    sodium chloride (NS) flush 5-40 mL  5-40 mL IntraVENous Q8H    sodium chloride (NS) flush 5-40 mL  5-40 mL IntraVENous PRN    acetaminophen (TYLENOL) tablet 650 mg  650 mg Oral Q6H PRN    Or    acetaminophen (TYLENOL) suppository 650 mg  650 mg Rectal Q6H PRN    polyethylene glycol (MIRALAX) packet 17 g  17 g Oral DAILY PRN    ondansetron (ZOFRAN ODT) tablet 4 mg  4 mg Oral Q8H PRN    Or    ondansetron (ZOFRAN) injection 4 mg  4 mg IntraVENous Q6H PRN    enoxaparin (LOVENOX) injection 30 mg  30 mg SubCUTAneous DAILY    haloperidol lactate (HALDOL) injection 4 mg  4 mg IntraMUSCular Q6H PRN    VANCOMYCIN INFORMATION NOTE   Other Rx Dosing/Monitoring    piperacillin-tazobactam (ZOSYN) 3.375 g in 0.9% sodium chloride (MBP/ADV) 100 mL MBP  3.375 g IntraVENous Q12H           Objective:      Physical Exam:  Essentially unchanged  Last 24hrs VS reviewed since prior progress note. Most recent are:    Visit Vitals  /70 (BP 1 Location: Right upper arm, BP Patient Position: At rest)   Pulse 68   Temp 98.1 °F (36.7 °C)   Resp (!) 33   Ht 5' 5\" (1.651 m)   Wt 75.9 kg (167 lb 5.3 oz)   SpO2 97%   BMI 27.85 kg/m²       Intake/Output Summary (Last 24 hours) at 5/24/2022 6138  Last data filed at 5/24/2022 0600  Gross per 24 hour   Intake --   Output 400 ml   Net -400 ml          Data Review    Telemetry: Sinus rhythm occasional ventricular ectopy. EKG:   []  No new EKG for review    Lab Data Personally Reviewed:    Recent Labs     05/24/22  0400 05/23/22  0545   WBC 23.0* 29.9*   HGB 12.3 12.5*   HCT 36.5 38.1    276     No results for input(s): INR, PTP, APTT, INREXT in the last 72 hours. Recent Labs     05/24/22  0400 05/23/22  0545    139   K 3.3* 3.9   * 101   CO2 23 31   BUN 26* 24*   CREA 1.10* 2.00*   * 104*   CA 8.0* 8.8     No results for input(s): CPK, CKNDX, TROIQ in the last 72 hours.     No lab exists for component: CPKMB  No results found for: CHOL, CHOLX, CHLST, CHOLV, HDL, HDLP, LDL, LDLC, DLDLP, TGLX, TRIGL, TRIGP, CHHD, CHHDX    Recent Labs     05/24/22  0400 05/23/22  0545    120*   TP 7.1 6.2*   ALB 2.5* 2.7*   GLOB 4.6* 3.5     Recent Labs 05/24/22  0400 05/23/22  0711   PH 7.40 7.38   PCO2 33* 20*   PO2 86 194*           Assessment/Plan:   1. Continue CCU care  2. Continue to monitor serum electrolytes, renal function  3. Continue to monitor fluid balance, daily weights  4.   Continue current cardiovascular medications including aspirin, atenolol, enoxaparin, and furosemide     Katherine Tesfaye MD

## 2022-05-24 NOTE — PROGRESS NOTES
Comprehensive Nutrition Assessment    Type and Reason for Visit: Positive nutrition screen (MST 2)    Nutrition Recommendations/Plan:   1. SLP eval to determine ability to consume PO- discussed with attending  2. RD to add ONS once diet initiated    3. If unable to initiate PO diet in 2-3days, place NGT and initiate the following:  TF via NGT of Osmolite 1.2 at 20ml/hr, aAdvance by 10ml q4hrs to goal of 60ml/hr  Flush 125ml water q4hrs  Providing 1728kcals, 80g pro, and 1931ml water (>/=100% needs)     Malnutrition Assessment:  Malnutrition Status:  Insufficient data (05/24/22 1136)      Nutrition Assessment:    Admitted for SOB, +acute encephalopathy and NIK. Likely aspiration pna. Unknown nutrition/wt hx at this time, no information in EMR. Pt currently NPO, rec'd SLP consult prior to initiation. RD to f/u for ONS vs NST. Labs: K+ 3.3, BUN 26, Cr 1.10, -125, ALT 11, Alb 2.5. Meds: Lasix, zoloft, vancomycin. Nutrition Related Findings:    Unable to complete NFPE at this time, noted pt bedbound and does not walk. No documented hx dysphagia, rec'd SLP consult considering hx CVA and level of disability. No N/V/D/C, last BM 5/24, watery. No edema. Wound Type:  (Skin cracked in gluteal fold, BL boggy heels)       Current Nutrition Intake & Therapies:  Average Meal Intake: NPO  Average Supplement Intake: NPO  DIET NPO Ice Chips, Sips of Water with Meds    Anthropometric Measures:  Height: 5' 5\" (165.1 cm)  Ideal Body Weight (IBW): 125 lbs (57 kg)  Admission Body Weight: 167 lb 5.3 oz (5/23)  Current Body Wt:  75.9 kg (167 lb 5.3 oz) (5/23), 133.9 % IBW. Bed scale  Current BMI (kg/m2): 27.8  Usual Body Weight:  (yaneth)     Weight Adjustment:  (68.1kg Geriatric IBW, 111% BW)                 BMI Category: Overweight (BMI 25.0-29. 9)  Wt Readings from Last 10 Encounters:   05/23/22 75.9 kg (167 lb 5.3 oz)   05/23/22 73.6 kg (162 lb 3.2 oz)   No wt hx to assess    Estimated Daily Nutrient Needs:  Energy Requirements Based On: Kcal/kg  Weight Used for Energy Requirements: Current  Energy (kcal/day): 1670-1898kcals (22-25kcals/kg, bedbound)  Weight Used for Protein Requirements: Current  Protein (g/day): 76-91g (1-1.2/kg)  Method Used for Fluid Requirements: 1 ml/kcal  Fluid (ml/day): 1670-1898ml    Nutrition Diagnosis:   No nutrition diagnosis at this time     Nutrition Interventions:   Food and/or Nutrient Delivery: Start oral diet  Nutrition Education/Counseling: No recommendations at this time  Coordination of Nutrition Care: Swallow evaluation,Continue to monitor while inpatient  Plan of Care discussed with: RN    Goals:     Goals: other (specify)  Specify Other Goals: Initiate means of nutrition capable of meeting >/=75% of EENs within 3 days, Meet >/=50% of EENs in >5 days, Wt maintenance within +/-0.5kg in >5 days    Nutrition Monitoring and Evaluation:   Behavioral-Environmental Outcomes: None identified  Food/Nutrient Intake Outcomes: Diet advancement/tolerance  Physical Signs/Symptoms Outcomes: Weight,Biochemical data,Diarrhea,Skin    Discharge Planning:     Too soon to determine    [employer]: Ext [phone], or via [address] [place] [address]

## 2022-05-24 NOTE — PROGRESS NOTES
Vancomycin Dosing Consult  Day #2 of vancomycin therapy  Consult ordered by Dr. Lionel Bullock for this 81 y.o. female for indication of aspiration PNA / sepsis    Antibiotic regimen: Vancomycin + Zosyn    Temp (24hrs), Av °F (37.2 °C), Min:98.1 °F (36.7 °C), Max:99.5 °F (37.5 °C)    Recent Labs     22  0400 22  0545   WBC 23.0* 29.9*     Recent Labs     22  0400 22  0545   CREA 1.10* 2.00*   BUN 26* 24*       Estimated Creatinine Clearance: 38.1 mL/min (A) (based on SCr of 1.1 mg/dL (H)). ml/min  Concomitant nephrotoxic drugs: None    MRSA Swab: Not detected     Target range: Trough 10-15 mcg/mL    Recent level history (3):  Date/Time Dose & Interval Measured Level (mcg/mL) Associated AUC/MARK    @0400 1000mg IV x1 () 5.4      Ht Readings from Last 1 Encounters:   22 165.1 cm (65\")     Wt Readings from Last 1 Encounters:   22 75.9 kg (167 lb 5.3 oz)     Ideal body weight: 57 kg (125 lb 10.6 oz)  Adjusted ideal body weight: 64.6 kg (142 lb 5.3 oz)        Assessment/Plan:   WBC, Scr trending down  Patient still has NIK  Vancomycin level is subtherapeutic  Give Vancomycin 1250mg IV x1 today  Pharmacy will order a random level tomorrow morning to monitor.   Antimicrobial stop date TBD

## 2022-05-25 ENCOUNTER — APPOINTMENT (OUTPATIENT)
Dept: GENERAL RADIOLOGY | Age: 87
DRG: 871 | End: 2022-05-25
Attending: INTERNAL MEDICINE
Payer: MEDICARE

## 2022-05-25 LAB
ANION GAP SERPL CALC-SCNC: 12 MMOL/L (ref 5–15)
ARTERIAL PATENCY WRIST A: ABNORMAL
BASE EXCESS BLDA CALC-SCNC: 19.3 MMOL/L (ref 0–2)
BASOPHILS # BLD: 0.1 K/UL (ref 0–0.1)
BASOPHILS NFR BLD: 0 % (ref 0–1)
BDY SITE: ABNORMAL
BUN SERPL-MCNC: 31 MG/DL (ref 6–20)
BUN/CREAT SERPL: 27 (ref 12–20)
CA-I BLD-MCNC: 7.7 MG/DL (ref 8.5–10.1)
CHLORIDE SERPL-SCNC: 108 MMOL/L (ref 97–108)
CO2 SERPL-SCNC: 20 MMOL/L (ref 21–32)
CREAT SERPL-MCNC: 1.11 MG/DL (ref 0.55–1.02)
CREAT SERPL-MCNC: 1.13 MG/DL (ref 0.55–1.02)
DIFFERENTIAL METHOD BLD: ABNORMAL
EOSINOPHIL # BLD: 0 K/UL (ref 0–0.4)
EOSINOPHIL NFR BLD: 0 % (ref 0–7)
ERYTHROCYTE [DISTWIDTH] IN BLOOD BY AUTOMATED COUNT: 14.4 % (ref 11.5–14.5)
GAS FLOW.O2 O2 DELIVERY SYS: 1 L/MIN
GLUCOSE BLD STRIP.AUTO-MCNC: 78 MG/DL (ref 65–117)
GLUCOSE BLD STRIP.AUTO-MCNC: 86 MG/DL (ref 65–117)
GLUCOSE SERPL-MCNC: 85 MG/DL (ref 65–100)
HCO3 BLDA-SCNC: 20 MMOL/L (ref 22–26)
HCT VFR BLD AUTO: 37.7 % (ref 35–47)
HGB BLD-MCNC: 12.7 G/DL (ref 11.5–16)
IMM GRANULOCYTES # BLD AUTO: 0.3 K/UL (ref 0–0.04)
IMM GRANULOCYTES NFR BLD AUTO: 1 % (ref 0–0.5)
LACTATE SERPL-SCNC: 1.2 MMOL/L (ref 0.4–2)
LACTATE SERPL-SCNC: 1.5 MMOL/L (ref 0.4–2)
LYMPHOCYTES # BLD: 1.3 K/UL (ref 0.8–3.5)
LYMPHOCYTES NFR BLD: 5 % (ref 12–49)
MCH RBC QN AUTO: 29.5 PG (ref 26–34)
MCHC RBC AUTO-ENTMCNC: 33.7 G/DL (ref 30–36.5)
MCV RBC AUTO: 87.7 FL (ref 80–99)
MONOCYTES # BLD: 0.4 K/UL (ref 0–1)
MONOCYTES NFR BLD: 2 % (ref 5–13)
NEUTS SEG # BLD: 23.6 K/UL (ref 1.8–8)
NEUTS SEG NFR BLD: 92 % (ref 32–75)
NRBC # BLD: 0.02 K/UL (ref 0–0.01)
NRBC BLD-RTO: 0.1 PER 100 WBC
PCO2 BLDA: 32 MMHG (ref 35–45)
PERFORMED BY, TECHID: NORMAL
PERFORMED BY, TECHID: NORMAL
PH BLDA: 7.38 [PH] (ref 7.35–7.45)
PLATELET # BLD AUTO: 316 K/UL (ref 150–400)
PMV BLD AUTO: 9.6 FL (ref 8.9–12.9)
PO2 BLDA: 66 MMHG (ref 75–100)
POTASSIUM SERPL-SCNC: 3.2 MMOL/L (ref 3.5–5.1)
POTASSIUM SERPL-SCNC: ABNORMAL MMOL/L (ref 3.5–5.1)
RBC # BLD AUTO: 4.3 M/UL (ref 3.8–5.2)
SAO2 % BLD: 94 %
SAO2% DEVICE SAO2% SENSOR NAME: ABNORMAL
SODIUM SERPL-SCNC: 140 MMOL/L (ref 136–145)
SPECIMEN SITE: ABNORMAL
VANCOMYCIN SERPL-MCNC: 16.1 UG/ML
WBC # BLD AUTO: 25.7 K/UL (ref 3.6–11)

## 2022-05-25 PROCEDURE — 71045 X-RAY EXAM CHEST 1 VIEW: CPT

## 2022-05-25 PROCEDURE — 82803 BLOOD GASES ANY COMBINATION: CPT

## 2022-05-25 PROCEDURE — 83605 ASSAY OF LACTIC ACID: CPT

## 2022-05-25 PROCEDURE — 77010033678 HC OXYGEN DAILY

## 2022-05-25 PROCEDURE — 82962 GLUCOSE BLOOD TEST: CPT

## 2022-05-25 PROCEDURE — 84132 ASSAY OF SERUM POTASSIUM: CPT

## 2022-05-25 PROCEDURE — 36415 COLL VENOUS BLD VENIPUNCTURE: CPT

## 2022-05-25 PROCEDURE — 80202 ASSAY OF VANCOMYCIN: CPT

## 2022-05-25 PROCEDURE — 74011250637 HC RX REV CODE- 250/637: Performed by: HOSPITALIST

## 2022-05-25 PROCEDURE — 85025 COMPLETE CBC W/AUTO DIFF WBC: CPT

## 2022-05-25 PROCEDURE — 74011000250 HC RX REV CODE- 250: Performed by: HOSPITALIST

## 2022-05-25 PROCEDURE — 74011000258 HC RX REV CODE- 258: Performed by: INTERNAL MEDICINE

## 2022-05-25 PROCEDURE — 65270000029 HC RM PRIVATE

## 2022-05-25 PROCEDURE — 74011250636 HC RX REV CODE- 250/636: Performed by: INTERNAL MEDICINE

## 2022-05-25 PROCEDURE — 74011250636 HC RX REV CODE- 250/636: Performed by: HOSPITALIST

## 2022-05-25 PROCEDURE — 74011250637 HC RX REV CODE- 250/637: Performed by: INTERNAL MEDICINE

## 2022-05-25 PROCEDURE — 92610 EVALUATE SWALLOWING FUNCTION: CPT

## 2022-05-25 RX ADMIN — WHITE PETROLATUM,ZINC OXIDE: 20; 75 PASTE TOPICAL at 11:00

## 2022-05-25 RX ADMIN — FUROSEMIDE 40 MG: 10 INJECTION, SOLUTION INTRAMUSCULAR; INTRAVENOUS at 20:28

## 2022-05-25 RX ADMIN — ASPIRIN 81 MG: 81 TABLET, COATED ORAL at 08:30

## 2022-05-25 RX ADMIN — WHITE PETROLATUM,ZINC OXIDE: 20; 75 PASTE TOPICAL at 20:29

## 2022-05-25 RX ADMIN — SERTRALINE HYDROCHLORIDE 50 MG: 50 TABLET ORAL at 08:30

## 2022-05-25 RX ADMIN — PIPERACILLIN AND TAZOBACTAM 3.38 G: 3; .375 INJECTION, POWDER, LYOPHILIZED, FOR SOLUTION INTRAVENOUS at 09:29

## 2022-05-25 RX ADMIN — SODIUM CHLORIDE, PRESERVATIVE FREE 10 ML: 5 INJECTION INTRAVENOUS at 20:28

## 2022-05-25 RX ADMIN — SODIUM CHLORIDE, PRESERVATIVE FREE 10 ML: 5 INJECTION INTRAVENOUS at 13:48

## 2022-05-25 RX ADMIN — VANCOMYCIN HYDROCHLORIDE 1000 MG: 1 INJECTION, POWDER, LYOPHILIZED, FOR SOLUTION INTRAVENOUS at 09:29

## 2022-05-25 RX ADMIN — ENOXAPARIN SODIUM 30 MG: 100 INJECTION SUBCUTANEOUS at 08:30

## 2022-05-25 RX ADMIN — FUROSEMIDE 40 MG: 10 INJECTION, SOLUTION INTRAMUSCULAR; INTRAVENOUS at 08:30

## 2022-05-25 RX ADMIN — PIPERACILLIN AND TAZOBACTAM 3.38 G: 3; .375 INJECTION, POWDER, LYOPHILIZED, FOR SOLUTION INTRAVENOUS at 03:06

## 2022-05-25 RX ADMIN — WHITE PETROLATUM,ZINC OXIDE: 20; 75 PASTE TOPICAL at 16:00

## 2022-05-25 RX ADMIN — SODIUM CHLORIDE, PRESERVATIVE FREE 10 ML: 5 INJECTION INTRAVENOUS at 06:25

## 2022-05-25 RX ADMIN — PIPERACILLIN AND TAZOBACTAM 3.38 G: 3; .375 INJECTION, POWDER, LYOPHILIZED, FOR SOLUTION INTRAVENOUS at 18:37

## 2022-05-25 NOTE — PROGRESS NOTES
Hospitalist Progress Note    Subjective:   Daily Progress Note: 5/25/2022 8:48 PM    Admission HPI/Hx (per Dr. Suzanne Quarles):  86F, h/o HTN, GERD, CVA bed bound does not walk, depression with shortness of breath since 1 day     [facility] and has been short of breath since 1 day, she was given etra lasix yesterday, and EMS was called, they placed her on NRB and her saturation went up to 97% with duo nebs. She was then transferred from [place] to [place].      ER: was placed on bipap and then to [facility].   ---------------------    Subjective: Feels well. More alert/conversant. Dr. Jessica Jeffers is also at bedside. Denies any discomfort, pain, nausea, SOB.       Current Facility-Administered Medications   Medication Dose Route Frequency    piperacillin-tazobactam (ZOSYN) 3.375 g in 0.9% sodium chloride (MBP/ADV) 100 mL MBP  3.375 g IntraVENous Q8H    furosemide (LASIX) injection 40 mg  40 mg IntraVENous BID    sertraline (ZOLOFT) tablet 50 mg  50 mg Oral DAILY    atenoloL (TENORMIN) tablet 50 mg  50 mg Oral DAILY    aspirin delayed-release tablet 81 mg  81 mg Oral DAILY    [Held by provider] amLODIPine (NORVASC) tablet 10 mg  10 mg Oral DAILY    sodium chloride (NS) flush 5-40 mL  5-40 mL IntraVENous Q8H    sodium chloride (NS) flush 5-40 mL  5-40 mL IntraVENous PRN    acetaminophen (TYLENOL) tablet 650 mg  650 mg Oral Q6H PRN    Or    acetaminophen (TYLENOL) suppository 650 mg  650 mg Rectal Q6H PRN    polyethylene glycol (MIRALAX) packet 17 g  17 g Oral DAILY PRN    ondansetron (ZOFRAN ODT) tablet 4 mg  4 mg Oral Q8H PRN    Or    ondansetron (ZOFRAN) injection 4 mg  4 mg IntraVENous Q6H PRN    enoxaparin (LOVENOX) injection 30 mg  30 mg SubCUTAneous DAILY    haloperidol lactate (HALDOL) injection 4 mg  4 mg IntraMUSCular Q6H PRN    VANCOMYCIN INFORMATION NOTE   Other Rx Dosing/Monitoring        Review of Systems: ltd due to respiratory failure    Objective:     Visit Vitals  /61 (BP 1 Location: Right upper arm, BP Patient Position: At rest)   Pulse (!) 59   Temp 98.6 °F (37 °C)   Resp 26   Ht 5' 5\" (1.651 m)   Wt 75.9 kg (167 lb 5.3 oz)   SpO2 99%   BMI 27.85 kg/m²    O2 Flow Rate (L/min): 1 l/min O2 Device: Nasal cannula    Temp (24hrs), Av.4 °F (36.9 °C), Min:97.9 °F (36.6 °C), Max:98.7 °F (37.1 °C)      No intake/output data recorded.  1901 -  0700  In: 80 [P.O.:480; I.V.:450]  Out: 2100 [Urine:2100]    PHYSICAL EXAM    Constitutional: Elderly frail AA female    Neck: Supple     Cardiovascular: S1S2, no murmur    Respiratory:  CTA ant today;     GI: Soft, NT; no rigidity; + bowel sounds    : voiding, clear yellow urine; good UO    Musculoskeletal: Moving all extremities spontaneously; no overt injury; no significant joint swelling    Neurological:  AxOx2; No new focal weakness;  No tremors    Psychiatric: Appropriate mood; oriented    Skin: No new rash or significant discoloration     Vascular: +Edema bilat LE      Data Review    Recent Results (from the past 24 hour(s))   BLOOD GAS, ARTERIAL    Collection Time: 22  4:00 AM   Result Value Ref Range    pH 7.38 7.35 - 7.45      PCO2 32 (L) 35 - 45 mmHg    PO2 66 (L) 75 - 100 mmHg    O2 SAT 94 (L) >95 %    BICARBONATE 20 (L) 22 - 26 mmol/L    BASE EXCESS 19.3 (H) 0 - 2 mmol/L    O2 METHOD Nasal Cannula      O2 FLOW RATE 1 L/min    Sample source Arterial      SITE Right Radial      JOHN'S TEST PASS     LACTIC ACID    Collection Time: 22  4:20 AM   Result Value Ref Range    Lactic acid 1.2 0.4 - 2.0 mmol/L   VANCOMYCIN, RANDOM    Collection Time: 22  4:20 AM   Result Value Ref Range    Vancomycin, random 16.1 ug/mL   CREATININE    Collection Time: 22  4:20 AM   Result Value Ref Range    Creatinine 1.11 (H) 0.55 - 6.21 mg/dL   METABOLIC PANEL, BASIC    Collection Time: 22  4:20 AM   Result Value Ref Range    Sodium 140 136 - 145 mmol/L    Potassium Hemolyzed, recollect requested 3.5 - 5.1 mmol/L Chloride 108 97 - 108 mmol/L    CO2 20 (L) 21 - 32 mmol/L    Anion gap 12 5 - 15 mmol/L    Glucose 85 65 - 100 mg/dL    BUN 31 (H) 6 - 20 mg/dL    Creatinine 1.13 (H) 0.55 - 1.02 mg/dL    BUN/Creatinine ratio 27 (H) 12 - 20      GFR est AA 55 (L) >60 ml/min/1.73m2    GFR est non-AA 46 (L) >60 ml/min/1.73m2    Calcium 7.7 (L) 8.5 - 10.1 mg/dL   CBC WITH AUTOMATED DIFF    Collection Time: 05/25/22  4:20 AM   Result Value Ref Range    WBC 25.7 (H) 3.6 - 11.0 K/uL    RBC 4.30 3.80 - 5.20 M/uL    HGB 12.7 11.5 - 16.0 g/dL    HCT 37.7 35.0 - 47.0 %    MCV 87.7 80.0 - 99.0 FL    MCH 29.5 26.0 - 34.0 PG    MCHC 33.7 30.0 - 36.5 g/dL    RDW 14.4 11.5 - 14.5 %    PLATELET 627 693 - 631 K/uL    MPV 9.6 8.9 - 12.9 FL    NRBC 0.1 (H) 0.0  WBC    ABSOLUTE NRBC 0.02 (H) 0.00 - 0.01 K/uL    NEUTROPHILS 92 (H) 32 - 75 %    LYMPHOCYTES 5 (L) 12 - 49 %    MONOCYTES 2 (L) 5 - 13 %    EOSINOPHILS 0 0 - 7 %    BASOPHILS 0 0 - 1 %    IMMATURE GRANULOCYTES 1 (H) 0 - 0.5 %    ABS. NEUTROPHILS 23.6 (H) 1.8 - 8.0 K/UL    ABS. LYMPHOCYTES 1.3 0.8 - 3.5 K/UL    ABS. MONOCYTES 0.4 0.0 - 1.0 K/UL    ABS. EOSINOPHILS 0.0 0.0 - 0.4 K/UL    ABS. BASOPHILS 0.1 0.0 - 0.1 K/UL    ABS. IMM. GRANS. 0.3 (H) 0.00 - 0.04 K/UL    DF AUTOMATED         CT CHEST WO:    1. Extensive bilateral predominantly groundglass infiltrates throughout both  lungs concerning for pneumonia. 2. Small effusions. ECHO:    Left Ventricle: Normal left ventricular systolic function with a visually estimated EF of 55 - 60%. Left ventricle size is normal. Normal wall thickness. Normal wall motion. Diastolic dysfunction present with normal LV EF. Grade I (mild) diastolic dysfunction or impaired relaxation. ,    Mitral Valve: Mild to moderate regurgitation.   Tricuspid Valve: Moderately elevated RVSP. The estimated RVSP is 46 mmHgmmHg.       Assessment/Plan:     (1) S/p AMS/Encephalopathy     (2) Sepsis due to Aspiration PNA/Bilateral PNA: halie and zosyn, order CT chest. Consul pulm, duo nebs, LA Q4H until less than2    (3) Secondary Acute Hypoxic Resp Failure due to PNA and CHF.     (4) Acute/Chronic Diastolic CHF     (5) NIK     (6) GERD: protonix    ==> Transfer to 4W    DVT Prophylaxis: On AC  Code Status: Full Code    ________________________________________  Ihsan Villa MD

## 2022-05-25 NOTE — WOUND CARE
Wound Care Note:    Wound Care into see patient because of \"wounds on admission\"    Skin Care & Pressure Relief Recommendations:  Minimize layers of linen  Pads under patient to optimize support surface and microclimate  Turn/reposition approximately every 2 hours. Pillow Wedges  Manage incontinence  Promote continence; Skin Protective lotion to buttocks and sacrum daily and as needed with incontinence care    Offload heels with pillows or offloading boots. No wounds noted to heels or buttocks, skin intact and no erythema. Minor MASD noted to gluteal cleft, no fissure at this time. Apply Optifoam Border Sacral foam dressing every other day to redistribute pressure from bony prominence and prevent pressure and friction injury to skin. Maintain the PureWick to manage  incontinence. Apply zinc paste TID and prn for soiling to gluteal folds, perineum, and posterior superior thighs to protect skin from incontinence related breakdown. Use foam wedge or Comfort Glide Wedges to turn q2h at 30 degree angle or more to offload sacrum. Maintain heel boots on both feet while in bed for offloading of the heels. Ensure straps are not tight across anterior foot/ankle. Maintain HOB at 30 degrees or less, if not contraindicated, to reduce pressure to buttocks and sacrum. Raise foot of bed to help prevent friction and shear injury from sliding down in the bed. Patient currently on a Total Care Bariatric bed w/air while in ICU. Re-consult WCN if skin condition changes.

## 2022-05-25 NOTE — PROGRESS NOTES
Report called to Tomer, RN on receiving unit (4W). Attempted to contact patient's brother, Jeff Santos, at number listed in chart but no answer. Spoke with alternate , Kenny Doll, and informed her that patient will be transferred to [facility]. She stated she will attempt to reach out to [place].

## 2022-05-25 NOTE — PROGRESS NOTES
Bedside swallow evaluation completed; Recommend Soft and bite size, thin liquids. Aspiration precautions. Assist w/ p.o. intake s/t self-feeding deficit. GERD precautions. MBS to be completed.

## 2022-05-25 NOTE — PROGRESS NOTES
Primary Nurse Perico Beauchamp  and SARAHI Guallpa performed a dual skin assessment on this patient. Pt skin is clean dry and intact. No wounds and pressure ulcer noted. Pt does have a little open spot on gluteal fold and wound care has seen her.

## 2022-05-25 NOTE — PROGRESS NOTES
Hospitalist Progress Note    Subjective:   Daily Progress Note: 5/24/2022 8:48 PM    Admission HPI/Hx (per Dr. Madiha Montilla):  86F, h/o HTN, GERD, CVA bed bound does not walk, depression with shortness of breath since 1 day     [facility] and has been short of breath since 1 day, she was given etra lasix yesterday, and EMS was called, they placed her on NRB and her saturation went up to 97% with duo nebs. She was then transferred from [place] [address] to [address] [place] [address].      ER: was placed on bipap and then to [place] [address].   ---------------------    Subjective: [Seen early AM]. Awake. High-flow. Awake. Denied pain, nausea, CP. Breathing ok.       Current Facility-Administered Medications   Medication Dose Route Frequency    [START ON 5/25/2022] VANCOMYCIN RANDOM LAB REMINDER   Other ONCE    piperacillin-tazobactam (ZOSYN) 3.375 g in 0.9% sodium chloride (MBP/ADV) 100 mL MBP  3.375 g IntraVENous Q8H    furosemide (LASIX) injection 40 mg  40 mg IntraVENous BID    sertraline (ZOLOFT) tablet 50 mg  50 mg Oral DAILY    atenoloL (TENORMIN) tablet 50 mg  50 mg Oral DAILY    aspirin delayed-release tablet 81 mg  81 mg Oral DAILY    [Held by provider] amLODIPine (NORVASC) tablet 10 mg  10 mg Oral DAILY    sodium chloride (NS) flush 5-40 mL  5-40 mL IntraVENous Q8H    sodium chloride (NS) flush 5-40 mL  5-40 mL IntraVENous PRN    acetaminophen (TYLENOL) tablet 650 mg  650 mg Oral Q6H PRN    Or    acetaminophen (TYLENOL) suppository 650 mg  650 mg Rectal Q6H PRN    polyethylene glycol (MIRALAX) packet 17 g  17 g Oral DAILY PRN    ondansetron (ZOFRAN ODT) tablet 4 mg  4 mg Oral Q8H PRN    Or    ondansetron (ZOFRAN) injection 4 mg  4 mg IntraVENous Q6H PRN    enoxaparin (LOVENOX) injection 30 mg  30 mg SubCUTAneous DAILY    haloperidol lactate (HALDOL) injection 4 mg  4 mg IntraMUSCular Q6H PRN    VANCOMYCIN INFORMATION NOTE   Other Rx Dosing/Monitoring        Review of Systems: ltd due to respiratory failure    Objective:     Visit Vitals  /75 (BP 1 Location: Right upper arm, BP Patient Position: At rest)   Pulse 77   Temp 98.7 °F (37.1 °C)   Resp 29   Ht 5' 5\" (1.651 m)   Wt 75.9 kg (167 lb 5.3 oz)   SpO2 99%   BMI 27.85 kg/m²    O2 Flow Rate (L/min): 20 l/min O2 Device: Hi flow nasal cannula,Heated    Temp (24hrs), Av.4 °F (36.9 °C), Min:97.9 °F (36.6 °C), Max:99.5 °F (37.5 °C)      No intake/output data recorded.  0701 -  1900  In: 80 [P.O.:480; I.V.:450]  Out: 1100 [Urine:1100]    PHYSICAL EXAM    Constitutional: Elderly frail AA female    HEENT: No lesions, rash    Neck: Supple     Cardiovascular: S1S2, no murmur    Respiratory:  Poor breath sounds anteriorly. High-Flow O2. GI: Soft, NT; no rigidity; + bowel sounds    : voiding, clear yellow urine    Musculoskeletal: Moving all extremities spontaneously; no overt injury; no significant joint swelling    Neurological:  AxOx2; No new focal weakness; No tremors    Psychiatric: Appropriate mood; oriented    Skin: No new rash or significant discoloration     Vascular: Palpable pulses;  No edema      Data Review    Recent Results (from the past 24 hour(s))   LACTIC ACID    Collection Time: 22  8:52 PM   Result Value Ref Range    Lactic acid 3.1 (HH) 0.4 - 2.0 mmol/L   LACTIC ACID    Collection Time: 22  3:10 AM   Result Value Ref Range    Lactic acid 2.0 0.4 - 2.0 mmol/L   CBC WITH AUTOMATED DIFF    Collection Time: 22  4:00 AM   Result Value Ref Range    WBC 23.0 (H) 3.6 - 11.0 K/uL    RBC 4.14 3.80 - 5.20 M/uL    HGB 12.3 11.5 - 16.0 g/dL    HCT 36.5 35.0 - 47.0 %    MCV 88.2 80.0 - 99.0 FL    MCH 29.7 26.0 - 34.0 PG    MCHC 33.7 30.0 - 36.5 g/dL    RDW 14.4 11.5 - 14.5 %    PLATELET 041 758 - 259 K/uL    MPV 9.9 8.9 - 12.9 FL    NRBC 0.0 0.0  WBC    ABSOLUTE NRBC 0.00 0.00 - 0.01 K/uL    NEUTROPHILS 93 (H) 32 - 75 %    LYMPHOCYTES 4 (L) 12 - 49 %    MONOCYTES 2 (L) 5 - 13 %    EOSINOPHILS 0 0 - 7 % BASOPHILS 0 0 - 1 %    IMMATURE GRANULOCYTES 1 (H) 0 - 0.5 %    ABS. NEUTROPHILS 21.5 (H) 1.8 - 8.0 K/UL    ABS. LYMPHOCYTES 0.8 0.8 - 3.5 K/UL    ABS. MONOCYTES 0.4 0.0 - 1.0 K/UL    ABS. EOSINOPHILS 0.0 0.0 - 0.4 K/UL    ABS. BASOPHILS 0.0 0.0 - 0.1 K/UL    ABS. IMM. GRANS. 0.3 (H) 0.00 - 0.04 K/UL    DF AUTOMATED     METABOLIC PANEL, COMPREHENSIVE    Collection Time: 05/24/22  4:00 AM   Result Value Ref Range    Sodium 142 136 - 145 mmol/L    Potassium 3.3 (L) 3.5 - 5.1 mmol/L    Chloride 113 (H) 97 - 108 mmol/L    CO2 23 21 - 32 mmol/L    Anion gap 6 5 - 15 mmol/L    Glucose 125 (H) 65 - 100 mg/dL    BUN 26 (H) 6 - 20 mg/dL    Creatinine 1.10 (H) 0.55 - 1.02 mg/dL    BUN/Creatinine ratio 24 (H) 12 - 20      GFR est AA 57 (L) >60 ml/min/1.73m2    GFR est non-AA 47 (L) >60 ml/min/1.73m2    Calcium 8.0 (L) 8.5 - 10.1 mg/dL    Bilirubin, total 0.6 0.2 - 1.0 mg/dL    AST (SGOT) 26 15 - 37 U/L    ALT (SGPT) 11 (L) 12 - 78 U/L    Alk. phosphatase 111 45 - 117 U/L    Protein, total 7.1 6.4 - 8.2 g/dL    Albumin 2.5 (L) 3.5 - 5.0 g/dL    Globulin 4.6 (H) 2.0 - 4.0 g/dL    A-G Ratio 0.5 (L) 1.1 - 2.2     TROPONIN-HIGH SENSITIVITY    Collection Time: 05/24/22  4:00 AM   Result Value Ref Range    Troponin-High Sensitivity 34 0 - 51 ng/L   VANCOMYCIN, RANDOM    Collection Time: 05/24/22  4:00 AM   Result Value Ref Range    Vancomycin, random 5.4 ug/mL   BLOOD GAS, ARTERIAL    Collection Time: 05/24/22  4:00 AM   Result Value Ref Range    pH 7.40 7.35 - 7.45      PCO2 33 (L) 35 - 45 mmHg    PO2 86 75 - 100 mmHg    O2 SAT 98 >95 %    BICARBONATE 22 22 - 26 mmol/L    BASE DEFICIT 3.4 (H) 0 - 2 mmol/L    O2 METHOD High Flow O2      O2 FLOW RATE 50.0 L/min    FIO2 50.0 %    EPAP/CPAP/PEEP 0      SITE Arterial Line      JOHN'S TEST PASS         CT CHEST WO CONT   Final Result   1. Extensive bilateral predominantly groundglass infiltrates throughout both   lungs concerning for pneumonia.  Edema is felt to be less likely but cannot be   excluded recommend follow-up. 2. Small effusions. XR CHEST PORT   Final Result   Extensive bilateral airspace disease without significant interval change.       XR CHEST PORT    (Results Pending)   XR CHEST PORT    (Results Pending)   XR CHEST PORT    (Results Pending)   XR CHEST PORT    (Results Pending)   XR CHEST PORT    (Results Pending)   XR CHEST PORT    (Results Pending)       Active Problems:    Respiratory failure ([address] (5/23/2022)        Assessment/Plan:     (1) Acute encephalopathy : GCS 13, likely s.t sepsis     (2) sepsis: vanc and zosyn, order CT chest. Consul pulm, duo nebs, LA Q4H until less than2     (3) bilateral pneumonia: vanc and zosyn.      (4) CHF unknown EF: lasix 40 BID, ECHO     (5) NIK : likely CRS     (6) GERD: protonix    Continue ICU Mgmt in consult with Pulm/Card    DVT Prophylaxis: On AC  Code Status: Full Code    ________________________________________  Stephany Castanon MD

## 2022-05-25 NOTE — PROGRESS NOTES
SPEECH LANGUAGE PATHOLOGY BEDSIDE SWALLOW EVALUATION  Patient: Linda Lovell (89 y.o. female)  Date: 5/25/2022  Primary Diagnosis: Respiratory failure ([facility] [address] [J96.90]        Precautions: aspiration      ASSESSMENT :  Based on the objective data described below, the patient presents with mild oral and pharyngeal dysphagia s/t generalized weakness and concerns for esophageal dysfunction placing patient at risk for aspiration. Recent chest XR reviewed. Patient tolerated sips of thin via cup and straw w/ reduced bolus coordination when drinking from cup. Anterior spillage of liquids present. Improved control w/ use of straw. Patient ingested w/ consecutive swallows. Swallow initiation timely, HLE and protraction adequate to digital palpation. X1 clinical indicator of penetration/ aspiration present after the swallow c/b delayed cough response. Patient ingested remaining 8 oz of thin w/ no overt s/sx of aspiration/penetration. Purees tolerated w/o difficulty. Prolonged mastication of solid s/t largely edentulous. Upper front teeth present only. Per chart review, patient was on a regular diet and thin liquids at residing facility. Patient appears appropriate for p.o diet of modified textures at this time. Recommend MBS to further assess swallow function and r/o aspiration. There are concerns for esophageal dysfunction given hx of GERD, belching after the swallow and thickening of distal esophagus noted on chest XR. GI consult may be warranted. Patient will benefit from skilled intervention to address the above impairments. Patients rehabilitation potential is considered to be Fair     PLAN :  Recommendations and Planned Interventions:  Soft and bite size, thin liquids. Aspiration precautions. Assist w/ p.o. intake s/t self-feeding deficit. GERD precautions. Frequency/Duration: Patient will be followed by speech-language pathology 5 times a week to address goals.   Discharge Recommendations: Skilled Nursing Facility     SUBJECTIVE:   Patient alert and seen at bedside. OBJECTIVE:     CXR Results  (Last 48 hours)                 05/25/22 0310  XR CHEST PORT Final result    Narrative:  Chest single view. Comparison single view chest May 24, 2022. Nonspecific hazy alveolar opacities through the lungs; overall reduced compared   to prior imaging. Cardiac and mediastinal structures unchanged. No pneumothorax   or sizable pleural effusion. 05/24/22 0255  XR CHEST PORT Final result    Impression:  Extensive bilateral airspace disease without significant interval change. Narrative:  Clinical history: Respiratory failure       Comparison: Chest radiograph 5/23/2022       Findings:    Single view chest. Cardiac monitoring leads project over the patient. Lung   volumes are adequate. Extensive diffuse airspace disease throughout both lungs,   right greater than left, similar compared to the prior exam. No pleural effusion   or pneumothorax. The cardiac silhouette is unchanged in size. The osseous   structures are intact. CT Results  (Last 48 hours)                 05/24/22 1513  CT CHEST WO CONT Final result    Impression:  1. Extensive bilateral predominantly groundglass infiltrates throughout both   lungs concerning for pneumonia. Edema is felt to be less likely but cannot be   excluded recommend follow-up. 2. Small effusions. Narrative:  Axial images through the chest were obtained without contrast. Sagittal and   coronal reformatted images were reviewed. No prior study. INDICATION: Pneumonia. 11 mm hypodense nodule in left thyroid lobe. Motion artifact obscures fine   detail. There are bilateral predominantly groundglass infiltrates in both lungs   esophagus is mildly dilated thickening distally. Small bilateral pleural   effusions. Thoracic aorta exhibits no focal aneurysm. Coronary artery   calcifications. No significant pericardial effusion.  No endobronchial lesions. Bone windows and reconstructed images show degenerative changes of the spine and   shoulders. Incidental imaging through the upper abdomen shows no significant   additional findings. Past Medical History:   Diagnosis Date    Diabetes ([address]     Gastrointestinal disorder     Hypertension     Stroke [facility])    History reviewed. No pertinent surgical history. Prior Level of Function/Home Situation:   Home Situation  Home Environment: Long term care  One/Two Story Residence: Other (Comment)  Living Alone: No  Support Systems: Other Family Member(s)  Patient Expects to be Discharged to[de-identified] Long Term Care  Current DME Used/Available at Home: None  Diet prior to admission: regular, thin  Current Diet:  NPO   Cognitive and Communication Status:  Neurologic State: Alert  Orientation Level: Oriented to person  Cognition: Follows commands           Swallowing Evaluation:   Oral Assessment:  Oral Assessment  Labial: No impairment  Dentition: Limited  Oral Hygiene: WFL  Lingual: Left deviation  P.O. Trials:  Patient Position: Upright in bed  Vocal quality prior to P.O.: No impairment  Consistency Presented: Thin liquid; Solid;Puree  How Presented: Successive swallows;Straw;Cup/sip;SLP-fed/presented     Bolus Acceptance: No impairment  Bolus Formation/Control: No impairment     Propulsion: No impairment  Oral Residue: None  Initiation of Swallow: No impairment  Laryngeal Elevation: Functional  Aspiration Signs/Symptoms: None  Pharyngeal Phase Characteristics: No impairment, issues, or problems              Oral Phase Severity: Minimal  Pharyngeal Phase Severity : No impairment  Voice:                 Vocal Quality: No impairment       Pain:  Pain Scale 1: FLACC          After treatment:   Patient left in no apparent distress in bed, Call bell within reach, and Nursing notified    COMMUNICATION/EDUCATION:   Patient was educated regarding purpose of SLP assessment, POC, diet recs and sw safety precautions. Patient demonstrated [address] understanding as evidenced by verbal understanding. The patient's plan of care including recommendations, planned interventions, and recommended diet changes were discussed with: Registered nurse. Patient/family have participated as able in goal setting and plan of care. Thank you for this referral.  JET Roth M.S. [id number] [facility]  Time Calculation: 17 mins           Problem: Dysphagia (Adult)  Goal: *Acute Goals and Plan of Care (Insert Text)  Description: Speech Therapy Swallow Goals  Initiated 5/25/2022  -Patient will tolerate SBS diet with thin liquids without clinical indicators of aspiration given minimal cues within 5-7 day(s). -Patient will tolerate PO trials without clinical indicators of aspiration given minimal cues within 5-7day(s). -Patient will participate in modified barium swallow study within 1-3 day(s). -Patient will demonstrate understanding of swallow safety precautions and aspiration precautions, diet recs with minimal cues within 5-7 day(s).           Outcome: Not Progressing Towards Goal

## 2022-05-25 NOTE — PROGRESS NOTES
Pulmonary/ CC Consult  This is an 81years old female was from nursing home with got transferred to hospital and got admitted in ICU for respiratory failure. She has history of hypertension, GERD, CVA and organic brain syndrome. She is bedbound. It is reported that she was short of breath over the last 24 hours and which got worse in nursing home. Rebreather facemask. She was placed on BiPAP which resulted in improvement in her saturations. Her chest x-ray showed bilateral infiltrates. Subjective:     Patient is evaluated at bedside. Shown significant improvement in respiratory distress. She is now switched to nasal cannula oxygen. Saturating well at bedside. More alert and interactive today. Patient Active Problem List   Diagnosis Code    Respiratory failure ([facility] [address] J96.90     Past Medical History:   Diagnosis Date    Diabetes ([facility] [address]     Gastrointestinal disorder     Hypertension     Stroke [facility])       History reviewed. No pertinent family history. Social History     Tobacco Use    Smoking status: Never Smoker    Smokeless tobacco: Never Used   Substance Use Topics    Alcohol use: Not on file     History reviewed. No pertinent surgical history. Prior to Admission medications    Medication Sig Start Date End Date Taking? Authorizing Provider   amLODIPine (NORVASC) 10 mg tablet Take 10 mg by mouth daily. Yes Provider, Historical   aspirin delayed-release 81 mg tablet Take 81 mg by mouth daily. Yes Provider, Historical   atenoloL (TENORMIN) 50 mg tablet Take 50 mg by mouth daily. Yes Provider, Historical   latanoprost (XALATAN) 0.005 % ophthalmic solution Administer 1 Drop to both eyes nightly. Yes Provider, Historical   losartan (COZAAR) 50 mg tablet Take 50 mg by mouth daily. Yes Provider, Historical   omeprazole (PRILOSEC) 20 mg capsule Take 20 mg by mouth daily. Yes Provider, Historical   potassium chloride SR (KLOR-CON 10) 10 mEq tablet Take 20 mEq by mouth daily.    Yes Provider, Historical   sertraline (ZOLOFT) 50 mg tablet Take 50 mg by mouth daily. Indications: major depressive disorder   Yes Provider, Historical   acetaminophen (TYLENOL) 500 mg tablet Take 500 mg by mouth every four (4) hours as needed for Pain. Yes Provider, Historical   loperamide (IMODIUM) 2 mg capsule Take 4 mg by mouth as needed for Diarrhea. Give 2 capsules by mouth every hour as needed for diarrhea after each loose stool up to 4 doses   Yes Provider, Historical     Allergies   Allergen Reactions    Pcn [Penicillins] Unknown (comments)        Review of Systems:      Objective:   Blood pressure 110/61, pulse (!) 59, temperature 98.6 °F (37 °C), resp. rate 26, height 5' 5\" (1.651 m), weight 75.9 kg (167 lb 5.3 oz), SpO2 99 %. Temp (24hrs), Av.5 °F (36.9 °C), Min:97.9 °F (36.6 °C), Max:98.7 °F (37.1 °C)    XR CHEST PORT   Final Result      CT CHEST WO CONT   Final Result   1. Extensive bilateral predominantly groundglass infiltrates throughout both   lungs concerning for pneumonia. Edema is felt to be less likely but cannot be   excluded recommend follow-up. 2. Small effusions. XR CHEST PORT   Final Result   Extensive bilateral airspace disease without significant interval change. XR CHEST PORT    (Results Pending)   XR CHEST PORT    (Results Pending)   XR CHEST PORT    (Results Pending)   XR CHEST PORT    (Results Pending)   XR CHEST PORT    (Results Pending)   XR CHEST PORT    (Results Pending)      Chest x-ray results were reviewed showing  Nonspecific hazy alveolar opacities through the lungs; overall reduced compared  to prior imaging. Cardiac and mediastinal structures unchanged. No pneumothorax  or sizable pleural effusion.   Data Review:   Current Facility-Administered Medications   Medication Dose Route Frequency    [START ON 2022] Vancomycin random level to be drawn on 22 at 0400 am.   Other ONCE    zinc oxide-white petrolatum 20-75 % topical paste   Topical TID    piperacillin-tazobactam (ZOSYN) 3.375 g in 0.9% sodium chloride (MBP/ADV) 100 mL MBP  3.375 g IntraVENous Q8H    furosemide (LASIX) injection 40 mg  40 mg IntraVENous BID    sertraline (ZOLOFT) tablet 50 mg  50 mg Oral DAILY    atenoloL (TENORMIN) tablet 50 mg  50 mg Oral DAILY    aspirin delayed-release tablet 81 mg  81 mg Oral DAILY    [Held by provider] amLODIPine (NORVASC) tablet 10 mg  10 mg Oral DAILY    sodium chloride (NS) flush 5-40 mL  5-40 mL IntraVENous Q8H    sodium chloride (NS) flush 5-40 mL  5-40 mL IntraVENous PRN    acetaminophen (TYLENOL) tablet 650 mg  650 mg Oral Q6H PRN    Or    acetaminophen (TYLENOL) suppository 650 mg  650 mg Rectal Q6H PRN    polyethylene glycol (MIRALAX) packet 17 g  17 g Oral DAILY PRN    ondansetron (ZOFRAN ODT) tablet 4 mg  4 mg Oral Q8H PRN    Or    ondansetron (ZOFRAN) injection 4 mg  4 mg IntraVENous Q6H PRN    enoxaparin (LOVENOX) injection 30 mg  30 mg SubCUTAneous DAILY    haloperidol lactate (HALDOL) injection 4 mg  4 mg IntraMUSCular Q6H PRN    VANCOMYCIN INFORMATION NOTE   Other Rx Dosing/Monitoring        Exam:      This is an elderly female who is awake and responding appropriately . Neck is supple. No cervical lymphadenopathy. Thyroid not large  Chest: Bilateral rhonchi. Currently on BiPAP. Abdomen: Soft, nontender, no visceromegaly  Heart: S1-S2, patient is tachycardic  Neuro: Neurological examination.   Patient is moving all her extremities        Recent Results (from the past 24 hour(s))   BLOOD GAS, ARTERIAL    Collection Time: 05/25/22  4:00 AM   Result Value Ref Range    pH 7.38 7.35 - 7.45      PCO2 32 (L) 35 - 45 mmHg    PO2 66 (L) 75 - 100 mmHg    O2 SAT 94 (L) >95 %    BICARBONATE 20 (L) 22 - 26 mmol/L    BASE EXCESS 19.3 (H) 0 - 2 mmol/L    O2 METHOD Nasal Cannula      O2 FLOW RATE 1 L/min    Sample source Arterial      SITE Right Radial      JOHN'S TEST PASS     LACTIC ACID    Collection Time: 05/25/22  4:20 AM   Result Value Ref Range    Lactic acid 1.2 0.4 - 2.0 mmol/L   VANCOMYCIN, RANDOM    Collection Time: 05/25/22  4:20 AM   Result Value Ref Range    Vancomycin, random 16.1 ug/mL   CREATININE    Collection Time: 05/25/22  4:20 AM   Result Value Ref Range    Creatinine 1.11 (H) 0.55 - 1.61 mg/dL   METABOLIC PANEL, BASIC    Collection Time: 05/25/22  4:20 AM   Result Value Ref Range    Sodium 140 136 - 145 mmol/L    Potassium Hemolyzed, recollect requested 3.5 - 5.1 mmol/L    Chloride 108 97 - 108 mmol/L    CO2 20 (L) 21 - 32 mmol/L    Anion gap 12 5 - 15 mmol/L    Glucose 85 65 - 100 mg/dL    BUN 31 (H) 6 - 20 mg/dL    Creatinine 1.13 (H) 0.55 - 1.02 mg/dL    BUN/Creatinine ratio 27 (H) 12 - 20      GFR est AA 55 (L) >60 ml/min/1.73m2    GFR est non-AA 46 (L) >60 ml/min/1.73m2    Calcium 7.7 (L) 8.5 - 10.1 mg/dL   CBC WITH AUTOMATED DIFF    Collection Time: 05/25/22  4:20 AM   Result Value Ref Range    WBC 25.7 (H) 3.6 - 11.0 K/uL    RBC 4.30 3.80 - 5.20 M/uL    HGB 12.7 11.5 - 16.0 g/dL    HCT 37.7 35.0 - 47.0 %    MCV 87.7 80.0 - 99.0 FL    MCH 29.5 26.0 - 34.0 PG    MCHC 33.7 30.0 - 36.5 g/dL    RDW 14.4 11.5 - 14.5 %    PLATELET 238 543 - 670 K/uL    MPV 9.6 8.9 - 12.9 FL    NRBC 0.1 (H) 0.0  WBC    ABSOLUTE NRBC 0.02 (H) 0.00 - 0.01 K/uL    NEUTROPHILS 92 (H) 32 - 75 %    LYMPHOCYTES 5 (L) 12 - 49 %    MONOCYTES 2 (L) 5 - 13 %    EOSINOPHILS 0 0 - 7 %    BASOPHILS 0 0 - 1 %    IMMATURE GRANULOCYTES 1 (H) 0 - 0.5 %    ABS. NEUTROPHILS 23.6 (H) 1.8 - 8.0 K/UL    ABS. LYMPHOCYTES 1.3 0.8 - 3.5 K/UL    ABS. MONOCYTES 0.4 0.0 - 1.0 K/UL    ABS. EOSINOPHILS 0.0 0.0 - 0.4 K/UL    ABS. BASOPHILS 0.1 0.0 - 0.1 K/UL    ABS. IMM. GRANS. 0.3 (H) 0.00 - 0.04 K/UL    DF AUTOMATED             Impression: This is an elderly female who is   Admitted because of hypoxic respiratory failure. Chest x-ray showed bilateral airspace disease with elevated WBC count of 29,000 and lactic acid level of 8.2. Plan:   1.   Severe sepsis:  Patient had severe sepsis with acute hypoxic respiratory failure. Chest x-ray showed bilateral airspace disease. Have developed aspiration pneumonia causing severe sepsis. It has shown improvement. Lactic acid level is down to 1.0. Her lactic acid level initially was 8.2  Patient is on IV Zosyn and IV vancomycin. Blood cultures are done, will check any growth. IV fluids volume resuscitation. 2.  Bilateral pneumonia:  Is on IV Zosyn and vancomycin  We will continue to watch WBCs. 3.  Acute hypoxic respiratory failure:  ABG this morning showed 7.3 8/32/66/20 4/94% on 4 L nasal cannula oxygen     Discussed with covering nurse.   Patient can be transferred to stepdown unit from pulmonary perspective  Thank you for involving me in the management of the patient  Total of 30 minutes were spent in patient's evaluation and management     Tree Patel MD  [facility]

## 2022-05-25 NOTE — PROGRESS NOTES
Vancomycin Dosing Consult  Day # 3 of vancomycin therapy  Consult ordered by Dr. Bree Leo for this 81 y.o. female for indication of aspiration PNA / sepsis    Antibiotic regimen: Vancomycin + Zosyn    Temp (24hrs), Av.4 °F (36.9 °C), Min:97.9 °F (36.6 °C), Max:98.7 °F (37.1 °C)    Recent Labs     22  0420 22  0400 22  0545   WBC 25.7* 23.0* 29.9*     Recent Labs     22  0420 22  0400 22  0545   CREA 1.13*  1.11* 1.10* 2.00*   BUN 31* 26* 24*       Estimated Creatinine Clearance: 37.8 mL/min (A) (based on SCr of 1.11 mg/dL (H)). ml/min  Concomitant nephrotoxic drugs: None    MRSA Swab: Not detected     Target range: Trough 10-15 mcg/mL    Recent level history (3):  Date/Time Dose & Interval Measured Level (mcg/mL) Associated AUC/MARK    @0400 1000mg IV x1  5.4    22 @ 1156 1250 mg X1 16.1    22 @ 1000 1000 mg X1             Ht Readings from Last 1 Encounters:   22 165.1 cm (65\")     Wt Readings from Last 1 Encounters:   22 75.9 kg (167 lb 5.3 oz)     Ideal body weight: 57 kg (125 lb 10.6 oz)  Adjusted ideal body weight: 64.6 kg (142 lb 5.3 oz)        Assessment/Plan:   WBC, and Scr  trending up   Patient still has NIK  Vancomycin level is  at  16.1  Give Vancomycin 1000 mg IV x1 today  Pharmacy will order a random level tomorrow morning  22 at  0400 am to monitor.   Antimicrobial stop date TBD

## 2022-05-25 NOTE — PROGRESS NOTES
Problem: Falls - Risk of  Goal: *Absence of Falls  Description: Document Edyta Goodwino Fall Risk and appropriate interventions in the flowsheet. Outcome: Progressing Towards Goal  Note: Fall Risk Interventions:       Mentation Interventions: Adequate sleep, hydration, pain control,Door open when patient unattended,Room close to nurse's station    Medication Interventions: Assess postural VS orthostatic hypotension    Elimination Interventions: Toileting schedule/hourly rounds              Problem: Patient Education: Go to Patient Education Activity  Goal: Patient/Family Education  Outcome: Progressing Towards Goal     Problem: Pressure Injury - Risk of  Goal: *Prevention of pressure injury  Description: Document Abiel Scale and appropriate interventions in the flowsheet. Outcome: Progressing Towards Goal  Note: Pressure Injury Interventions:  Sensory Interventions: Turn and reposition approx. every two hours (pillows and wedges if needed),Keep linens dry and wrinkle-free,Minimize linen layers,Monitor skin under medical devices,Pad between skin to skin,Check visual cues for pain    Moisture Interventions: Absorbent underpads,Apply protective barrier, creams and emollients,Assess need for specialty bed,Check for incontinence Q2 hours and as needed,Internal/External urinary devices,Maintain skin hydration (lotion/cream),Minimize layers,Moisture barrier    Activity Interventions: Assess need for specialty bed    Mobility Interventions: Assess need for specialty bed,Turn and reposition approx.  every two hours(pillow and wedges)    Nutrition Interventions: Discuss nutritional consult with provider    Friction and Shear Interventions: Apply protective barrier, creams and emollients,Foam dressings/transparent film/skin sealants,Minimize layers                Problem: Patient Education: Go to Patient Education Activity  Goal: Patient/Family Education  Outcome: Progressing Towards Goal     Problem: Patient Education: Go to Patient Education Activity  Goal: Patient/Family Education  Outcome: Progressing Towards Goal     Problem: Patient Education: Go to Patient Education Activity  Goal: Patient/Family Education  Outcome: Progressing Towards Goal

## 2022-05-25 NOTE — PROGRESS NOTES
Progress Note      5/25/2022 8:22 AM  NAME: Marie Boothe   MRN:  [id number]   Ad[facility] [J96.90]      Problem List:   1.  Incomplete database secondary to patient being a poor historian  2. Stefan Bond presents for evaluation of shortness of breath  3.  Acute hypoxic respiratory failure requiring BiPAP on admission  4.  Bilateral aspiration pneumonia  5.  Sepsis  6. Grade I (mild) diastolic dysfunction, or impaired relaxation  7. Mild to moderate pulmonary hypertension (RVSP = 44 mmHg)  8.  Previous cerebrovascular accident (CVA)  9.  Hypertension  10.  Type 2 diabetes     11.  Elevated cardiac enzymes in the indeterminate range  12.  Elevated BNP (5580 pg/mL)  13.  Chronic kidney disease  14.  Gastroesophageal reflux disease (GERD)  15.  Debility  16.  Transaminitis  17.  Leukocytosis 10. Electrolyte abnormalities (hypocalcemia)       Subjective: The patient is seen and examined in coronary care unit (CCU) room 286. There were no acute cardiovascular events reported overnight. Currently, she denies any cardiovascular complaints. Specifically, she denies any chest pain or shortness of breath. Discussed with RN events overnight.      Medications Personally Reviewed:    Current Facility-Administered Medications   Medication Dose Route Frequency    piperacillin-tazobactam (ZOSYN) 3.375 g in 0.9% sodium chloride (MBP/ADV) 100 mL MBP  3.375 g IntraVENous Q8H    furosemide (LASIX) injection 40 mg  40 mg IntraVENous BID    sertraline (ZOLOFT) tablet 50 mg  50 mg Oral DAILY    atenoloL (TENORMIN) tablet 50 mg  50 mg Oral DAILY    aspirin delayed-release tablet 81 mg  81 mg Oral DAILY    [Held by provider] amLODIPine (NORVASC) tablet 10 mg  10 mg Oral DAILY    sodium chloride (NS) flush 5-40 mL  5-40 mL IntraVENous Q8H    sodium chloride (NS) flush 5-40 mL  5-40 mL IntraVENous PRN    acetaminophen (TYLENOL) tablet 650 mg  650 mg Oral Q6H PRN    Or    acetaminophen (TYLENOL) suppository 650 mg  650 mg Rectal Q6H PRN    polyethylene glycol (MIRALAX) packet 17 g  17 g Oral DAILY PRN    ondansetron (ZOFRAN ODT) tablet 4 mg  4 mg Oral Q8H PRN    Or    ondansetron (ZOFRAN) injection 4 mg  4 mg IntraVENous Q6H PRN    enoxaparin (LOVENOX) injection 30 mg  30 mg SubCUTAneous DAILY    haloperidol lactate (HALDOL) injection 4 mg  4 mg IntraMUSCular Q6H PRN    VANCOMYCIN INFORMATION NOTE   Other Rx Dosing/Monitoring           Objective:      Physical Exam:  Essentially unchanged  Last 24hrs VS reviewed since prior progress note. Most recent are:    Visit Vitals  BP (!) 105/49 (BP Patient Position: At rest)   Pulse (!) 59   Temp 98.6 °F (37 °C)   Resp 24   Ht 5' 5\" (1.651 m)   Wt 75.9 kg (167 lb 5.3 oz)   SpO2 97%   BMI 27.85 kg/m²       Intake/Output Summary (Last 24 hours) at 5/25/2022 8823  Last data filed at 5/25/2022 0600  Gross per 24 hour   Intake 930 ml   Output 1700 ml   Net -770 ml          Data Review    Telemetry: Sinus rhythm without ventricular ectopy    EKG:   []  No new EKG for review    Lab Data Personally Reviewed:    Recent Labs     05/25/22  0420 05/24/22  0400   WBC 25.7* 23.0*   HGB 12.7 12.3   HCT 37.7 36.5    270     No results for input(s): INR, PTP, APTT, INREXT in the last 72 hours. Recent Labs     05/25/22  0420 05/24/22  0400 05/23/22  0545    142 139   K Hemolyzed, recollect requested 3.3* 3.9    113* 101   CO2 20* 23 31   BUN 31* 26* 24*   CREA 1.13*  1.11* 1.10* 2.00*   GLU 85 125* 104*   CA 7.7* 8.0* 8.8     No results for input(s): CPK, CKNDX, TROIQ in the last 72 hours.     No lab exists for component: CPKMB  No results found for: CHOL, CHOLX, CHLST, CHOLV, HDL, HDLP, LDL, LDLC, DLDLP, TGLX, TRIGL, TRIGP, CHHD, CHHDX    Recent Labs     05/24/22  0400 05/23/22  0545    120*   TP 7.1 6.2*   ALB 2.5* 2.7*   GLOB 4.6* 3.5     Recent Labs     05/25/22 0400 05/24/22  0400   PH 7.38 7.40   PCO2 32* 33*   PO2 66* 86 Assessment/Plan:   1. From a cardiovascular standpoint the patient may transfer to 3 . with telemetry  2. Continue to monitor serum electrolytes, renal function  3. Continue to monitor fluid balance, daily weights  4.   Continue current cardiovascular medications including aspirin, atenolol, enoxaparin, and furosemide     Amy Champion MD

## 2022-05-25 NOTE — PROGRESS NOTES
Vancomycin Dosing Consult  Day # 3 of vancomycin therapy  Consult ordered by Dr. Cristiano Dudley for this 81 y.o. female for indication of aspiration PNA / sepsis    Antibiotic regimen: Vancomycin + Zosyn    Temp (24hrs), Av.4 °F (36.9 °C), Min:97.9 °F (36.6 °C), Max:98.7 °F (37.1 °C)    Recent Labs     22  0420 22  0400 22  0545   WBC 25.7* 23.0* 29.9*     Recent Labs     22  0420 22  0400 22  0545   CREA 1.13*  1.11* 1.10* 2.00*   BUN 31* 26* 24*       Estimated Creatinine Clearance: 37.8 mL/min (A) (based on SCr of 1.11 mg/dL (H)). ml/min  Concomitant nephrotoxic drugs: None    MRSA Swab: Not detected     Target range: Trough 10-15 mcg/mL    Recent level history (3):  Date/Time Dose & Interval Measured Level (mcg/mL) Associated AUC/MARK    @0400 1000mg IV x1  5.4    22 @ 1156 1250 mg X1 16.1    22 @ 1000 1000 mg X1             Ht Readings from Last 1 Encounters:   22 165.1 cm (65\")     Wt Readings from Last 1 Encounters:   22 75.9 kg (167 lb 5.3 oz)     Ideal body weight: 57 kg (125 lb 10.6 oz)  Adjusted ideal body weight: 64.6 kg (142 lb 5.3 oz)        Assessment/Plan:   WBC, and Scr  trending up   Patient still has NIK  Vancomycin level is  at  16.1  Give Vancomycin 1000 mg IV x1 today  Pharmacy will order a random level tomorrow morning  22 at  0400 am to monitor.   Antimicrobial stop date TBD

## 2022-05-25 NOTE — PROGRESS NOTES
EXAM:  LEFT HIP



HISTORY:  LEFT HIP DISLOCATION.  



TECHNIQUE:  AP and lateral views of the left hip were obtained.  



Comparison:  Left hip series 12/31/17 at 3:32 p.m.  



FINDINGS:  There is now a superior dislocation of the femoral component of the 
left MYRA, new since the prior study.  No associated fracture is seen.  There 
are several round and a few square metallic like densities somewhat circling 
the region of the left hip somewhat laterally which were not present on the 
prior study and are presumably external to the patient.  This may relate to 
some overlying clothing.  



IMPRESSION:  

SUPERIOR DISLOCATION OF THE FEMORAL COMPONENT OF THE LEFT MYRA.  



JOB NUMBER:  [id number]
[facility]. Recent clinicals sent via [facility].          GURDEEP Ji

## 2022-05-26 ENCOUNTER — APPOINTMENT (OUTPATIENT)
Dept: GENERAL RADIOLOGY | Age: 87
DRG: 871 | End: 2022-05-26
Attending: INTERNAL MEDICINE
Payer: MEDICARE

## 2022-05-26 LAB
ANION GAP SERPL CALC-SCNC: 12 MMOL/L (ref 5–15)
BASOPHILS # BLD: 0 K/UL (ref 0–0.1)
BASOPHILS NFR BLD: 0 % (ref 0–1)
BUN SERPL-MCNC: 29 MG/DL (ref 6–20)
BUN/CREAT SERPL: 30 (ref 12–20)
CA-I BLD-MCNC: 7.4 MG/DL (ref 8.5–10.1)
CHLORIDE SERPL-SCNC: 108 MMOL/L (ref 97–108)
CO2 SERPL-SCNC: 22 MMOL/L (ref 21–32)
CREAT SERPL-MCNC: 0.97 MG/DL (ref 0.55–1.02)
CRP SERPL-MCNC: 8.84 MG/DL (ref 0–0.6)
DIFFERENTIAL METHOD BLD: ABNORMAL
EOSINOPHIL # BLD: 0.2 K/UL (ref 0–0.4)
EOSINOPHIL NFR BLD: 1 % (ref 0–7)
ERYTHROCYTE [DISTWIDTH] IN BLOOD BY AUTOMATED COUNT: 14.3 % (ref 11.5–14.5)
GLUCOSE SERPL-MCNC: 101 MG/DL (ref 65–100)
HCT VFR BLD AUTO: 35.4 % (ref 35–47)
HGB BLD-MCNC: 11.7 G/DL (ref 11.5–16)
IMM GRANULOCYTES # BLD AUTO: 0.2 K/UL (ref 0–0.04)
IMM GRANULOCYTES NFR BLD AUTO: 2 % (ref 0–0.5)
LACTATE SERPL-SCNC: 1.2 MMOL/L (ref 0.4–2)
LYMPHOCYTES # BLD: 1.3 K/UL (ref 0.8–3.5)
LYMPHOCYTES NFR BLD: 10 % (ref 12–49)
MAGNESIUM SERPL-MCNC: 1.6 MG/DL (ref 1.6–2.4)
MCH RBC QN AUTO: 29.5 PG (ref 26–34)
MCHC RBC AUTO-ENTMCNC: 33.1 G/DL (ref 30–36.5)
MCV RBC AUTO: 89.2 FL (ref 80–99)
MONOCYTES # BLD: 0.5 K/UL (ref 0–1)
MONOCYTES NFR BLD: 4 % (ref 5–13)
NEUTS SEG # BLD: 10.8 K/UL (ref 1.8–8)
NEUTS SEG NFR BLD: 83 % (ref 32–75)
NRBC # BLD: 0.02 K/UL (ref 0–0.01)
NRBC BLD-RTO: 0.2 PER 100 WBC
PLATELET # BLD AUTO: 279 K/UL (ref 150–400)
PMV BLD AUTO: 9.4 FL (ref 8.9–12.9)
POTASSIUM SERPL-SCNC: 2.9 MMOL/L (ref 3.5–5.1)
RBC # BLD AUTO: 3.97 M/UL (ref 3.8–5.2)
SODIUM SERPL-SCNC: 142 MMOL/L (ref 136–145)
VANCOMYCIN SERPL-MCNC: 16.9 UG/ML
WBC # BLD AUTO: 13 K/UL (ref 3.6–11)

## 2022-05-26 PROCEDURE — 85025 COMPLETE CBC W/AUTO DIFF WBC: CPT

## 2022-05-26 PROCEDURE — 36415 COLL VENOUS BLD VENIPUNCTURE: CPT

## 2022-05-26 PROCEDURE — 74011250636 HC RX REV CODE- 250/636: Performed by: HOSPITALIST

## 2022-05-26 PROCEDURE — 74011250637 HC RX REV CODE- 250/637: Performed by: INTERNAL MEDICINE

## 2022-05-26 PROCEDURE — 74011000250 HC RX REV CODE- 250: Performed by: HOSPITALIST

## 2022-05-26 PROCEDURE — 74011000258 HC RX REV CODE- 258: Performed by: INTERNAL MEDICINE

## 2022-05-26 PROCEDURE — 71045 X-RAY EXAM CHEST 1 VIEW: CPT

## 2022-05-26 PROCEDURE — 80048 BASIC METABOLIC PNL TOTAL CA: CPT

## 2022-05-26 PROCEDURE — 77010033678 HC OXYGEN DAILY

## 2022-05-26 PROCEDURE — 92611 MOTION FLUOROSCOPY/SWALLOW: CPT

## 2022-05-26 PROCEDURE — 74230 X-RAY XM SWLNG FUNCJ C+: CPT

## 2022-05-26 PROCEDURE — 86140 C-REACTIVE PROTEIN: CPT

## 2022-05-26 PROCEDURE — 74011250637 HC RX REV CODE- 250/637: Performed by: HOSPITALIST

## 2022-05-26 PROCEDURE — 83605 ASSAY OF LACTIC ACID: CPT

## 2022-05-26 PROCEDURE — 74011000250 HC RX REV CODE- 250: Performed by: INTERNAL MEDICINE

## 2022-05-26 PROCEDURE — 80202 ASSAY OF VANCOMYCIN: CPT

## 2022-05-26 PROCEDURE — 65270000029 HC RM PRIVATE

## 2022-05-26 PROCEDURE — 83735 ASSAY OF MAGNESIUM: CPT

## 2022-05-26 PROCEDURE — 74011250636 HC RX REV CODE- 250/636: Performed by: INTERNAL MEDICINE

## 2022-05-26 RX ORDER — CARVEDILOL 3.12 MG/1
3.12 TABLET ORAL 2 TIMES DAILY WITH MEALS
Status: DISCONTINUED | OUTPATIENT
Start: 2022-05-26 | End: 2022-05-28 | Stop reason: HOSPADM

## 2022-05-26 RX ORDER — POTASSIUM CHLORIDE 1.5 G/1.77G
20 POWDER, FOR SOLUTION ORAL 2 TIMES DAILY WITH MEALS
Status: DISCONTINUED | OUTPATIENT
Start: 2022-05-26 | End: 2022-05-28 | Stop reason: HOSPADM

## 2022-05-26 RX ADMIN — BARIUM SULFATE 25 ML: 0.81 POWDER, FOR SUSPENSION ORAL at 09:57

## 2022-05-26 RX ADMIN — SODIUM CHLORIDE, PRESERVATIVE FREE 10 ML: 5 INJECTION INTRAVENOUS at 17:34

## 2022-05-26 RX ADMIN — WHITE PETROLATUM,ZINC OXIDE: 20; 75 PASTE TOPICAL at 09:00

## 2022-05-26 RX ADMIN — BARIUM SULFATE 5 ML: 400 SUSPENSION ORAL at 09:56

## 2022-05-26 RX ADMIN — SODIUM CHLORIDE, PRESERVATIVE FREE 10 ML: 5 INJECTION INTRAVENOUS at 21:02

## 2022-05-26 RX ADMIN — BARIUM SULFATE 5 ML: 400 PASTE ORAL at 09:56

## 2022-05-26 RX ADMIN — SERTRALINE HYDROCHLORIDE 50 MG: 50 TABLET ORAL at 09:13

## 2022-05-26 RX ADMIN — WHITE PETROLATUM,ZINC OXIDE: 20; 75 PASTE TOPICAL at 22:00

## 2022-05-26 RX ADMIN — POTASSIUM CHLORIDE 20 MEQ: 1.5 FOR SOLUTION ORAL at 17:34

## 2022-05-26 RX ADMIN — WHITE PETROLATUM,ZINC OXIDE: 20; 75 PASTE TOPICAL at 16:00

## 2022-05-26 RX ADMIN — PIPERACILLIN AND TAZOBACTAM 3.38 G: 3; .375 INJECTION, POWDER, LYOPHILIZED, FOR SOLUTION INTRAVENOUS at 10:18

## 2022-05-26 RX ADMIN — PIPERACILLIN AND TAZOBACTAM 3.38 G: 3; .375 INJECTION, POWDER, LYOPHILIZED, FOR SOLUTION INTRAVENOUS at 01:34

## 2022-05-26 RX ADMIN — ASPIRIN 81 MG: 81 TABLET, COATED ORAL at 09:13

## 2022-05-26 RX ADMIN — SODIUM CHLORIDE, PRESERVATIVE FREE 10 ML: 5 INJECTION INTRAVENOUS at 05:01

## 2022-05-26 RX ADMIN — ENOXAPARIN SODIUM 30 MG: 100 INJECTION SUBCUTANEOUS at 09:13

## 2022-05-26 RX ADMIN — PIPERACILLIN AND TAZOBACTAM 3.38 G: 3; .375 INJECTION, POWDER, LYOPHILIZED, FOR SOLUTION INTRAVENOUS at 17:34

## 2022-05-26 NOTE — PROGRESS NOTES
Pulmonary/ CC Consult  This is an 81years old female was from nursing home with got transferred to hospital and got admitted in ICU for respiratory failure. She has history of hypertension, GERD, CVA and organic brain syndrome. She is bedbound. It is reported that she was short of breath over the last 24 hours and which got worse in nursing home. Rebreather facemask. She was placed on BiPAP which resulted in improvement in her saturations. Her chest x-ray showed bilateral infiltrates. Subjective:     Patient is evaluated at bedside. Shown significant improvement in respiratory distress. She is now switched to nasal cannula oxygen. On 1 L of oxygen. Alert and responding well. Patient's brother at bedside    Patient Active Problem List   Diagnosis Code    Respiratory failure ([facility] J96.90     Past Medical History:   Diagnosis Date    Diabetes ([facility]     Gastrointestinal disorder     Hypertension     Stroke [facility])       History reviewed. No pertinent family history. Social History     Tobacco Use    Smoking status: Never Smoker    Smokeless tobacco: Never Used   Substance Use Topics    Alcohol use: Not on file     History reviewed. No pertinent surgical history. Prior to Admission medications    Medication Sig Start Date End Date Taking? Authorizing Provider   amLODIPine (NORVASC) 10 mg tablet Take 10 mg by mouth daily. Yes Provider, Historical   aspirin delayed-release 81 mg tablet Take 81 mg by mouth daily. Yes Provider, Historical   atenoloL (TENORMIN) 50 mg tablet Take 50 mg by mouth daily. Yes Provider, Historical   latanoprost (XALATAN) 0.005 % ophthalmic solution Administer 1 Drop to both eyes nightly. Yes Provider, Historical   losartan (COZAAR) 50 mg tablet Take 50 mg by mouth daily. Yes Provider, Historical   omeprazole (PRILOSEC) 20 mg capsule Take 20 mg by mouth daily.    Yes Provider, Historical   potassium chloride SR (KLOR-CON 10) 10 mEq tablet Take 20 mEq by mouth daily. Yes Provider, Historical   sertraline (ZOLOFT) 50 mg tablet Take 50 mg by mouth daily. Indications: major depressive disorder   Yes Provider, Historical   acetaminophen (TYLENOL) 500 mg tablet Take 500 mg by mouth every four (4) hours as needed for Pain. Yes Provider, Historical   loperamide (IMODIUM) 2 mg capsule Take 4 mg by mouth as needed for Diarrhea. Give 2 capsules by mouth every hour as needed for diarrhea after each loose stool up to 4 doses   Yes Provider, Historical     Allergies   Allergen Reactions    Pcn [Penicillins] Unknown (comments)        Review of Systems:      Objective:   Blood pressure (!) 102/57, pulse 63, temperature 98.5 °F (36.9 °C), resp. rate 20, height 5' 5\" (1.651 m), weight 74.8 kg (164 lb 14.5 oz), SpO2 98 %. Temp (24hrs), Av.9 °F (36.6 °C), Min:97.4 °F (36.3 °C), Max:98.5 °F (36.9 °C)    XR SWALLOW FUNC VIDEO   Final Result      XR CHEST PORT   Final Result      XR CHEST PORT   Final Result      CT CHEST WO CONT   Final Result   1. Extensive bilateral predominantly groundglass infiltrates throughout both   lungs concerning for pneumonia. Edema is felt to be less likely but cannot be   excluded recommend follow-up. 2. Small effusions. XR CHEST PORT   Final Result   Extensive bilateral airspace disease without significant interval change. Chest x-ray results were reviewed showing  Nonspecific hazy alveolar opacities through the lungs; overall reduced compared  to prior imaging. Cardiac and mediastinal structures unchanged. No pneumothorax  or sizable pleural effusion.   Data Review:   Current Facility-Administered Medications   Medication Dose Route Frequency    carvediloL (COREG) tablet 3.125 mg  3.125 mg Oral BID WITH MEALS    potassium chloride (KLOR-CON) packet for solution 20 mEq  20 mEq Oral BID WITH MEALS    zinc oxide-white petrolatum 20-75 % topical paste   Topical TID    piperacillin-tazobactam (ZOSYN) 3.375 g in 0.9% sodium chloride (MBP/ADV) 100 mL MBP  3.375 g IntraVENous Q8H    [Held by provider] furosemide (LASIX) injection 40 mg  40 mg IntraVENous BID    sertraline (ZOLOFT) tablet 50 mg  50 mg Oral DAILY    aspirin delayed-release tablet 81 mg  81 mg Oral DAILY    [Held by provider] amLODIPine (NORVASC) tablet 10 mg  10 mg Oral DAILY    sodium chloride (NS) flush 5-40 mL  5-40 mL IntraVENous Q8H    sodium chloride (NS) flush 5-40 mL  5-40 mL IntraVENous PRN    acetaminophen (TYLENOL) tablet 650 mg  650 mg Oral Q6H PRN    Or    acetaminophen (TYLENOL) suppository 650 mg  650 mg Rectal Q6H PRN    polyethylene glycol (MIRALAX) packet 17 g  17 g Oral DAILY PRN    ondansetron (ZOFRAN ODT) tablet 4 mg  4 mg Oral Q8H PRN    Or    ondansetron (ZOFRAN) injection 4 mg  4 mg IntraVENous Q6H PRN    enoxaparin (LOVENOX) injection 30 mg  30 mg SubCUTAneous DAILY    haloperidol lactate (HALDOL) injection 4 mg  4 mg IntraMUSCular Q6H PRN        Exam:      This is an elderly female who is awake and responding appropriately . Neck is supple. No cervical lymphadenopathy. Thyroid not large  Chest: Bilateral rhonchi. Currently on BiPAP. Abdomen: Soft, nontender, no visceromegaly  Heart: S1-S2, patient is tachycardic  Neuro: Neurological examination.   Patient is moving all her extremities        Recent Results (from the past 24 hour(s))   LACTIC ACID    Collection Time: 05/25/22  6:18 PM   Result Value Ref Range    Lactic acid 1.5 0.4 - 2.0 mmol/L   GLUCOSE, POC    Collection Time: 05/25/22  8:54 PM   Result Value Ref Range    Glucose (POC) 86 65 - 117 mg/dL    Performed by [address] ACID    Collection Time: 05/26/22  6:55 AM   Result Value Ref Range    Lactic acid 1.2 0.4 - 2.0 mmol/L   C REACTIVE PROTEIN, QT    Collection Time: 05/26/22  6:55 AM   Result Value Ref Range    C-Reactive protein 8.84 (H) 0.00 - 3.33 mg/dL   METABOLIC PANEL, BASIC    Collection Time: 05/26/22  6:55 AM   Result Value Ref Range    Sodium 142 136 - 145 mmol/L    Potassium 2.9 (L) 3.5 - 5.1 mmol/L    Chloride 108 97 - 108 mmol/L    CO2 22 21 - 32 mmol/L    Anion gap 12 5 - 15 mmol/L    Glucose 101 (H) 65 - 100 mg/dL    BUN 29 (H) 6 - 20 mg/dL    Creatinine 0.97 0.55 - 1.02 mg/dL    BUN/Creatinine ratio 30 (H) 12 - 20      GFR est AA >60 >60 ml/min/1.73m2    GFR est non-AA 55 (L) >60 ml/min/1.73m2    Calcium 7.4 (L) 8.5 - 10.1 mg/dL   MAGNESIUM    Collection Time: 05/26/22  6:55 AM   Result Value Ref Range    Magnesium 1.6 1.6 - 2.4 mg/dL   VANCOMYCIN, RANDOM    Collection Time: 05/26/22  6:55 AM   Result Value Ref Range    Vancomycin, random 16.9 ug/mL   CBC WITH AUTOMATED DIFF    Collection Time: 05/26/22  6:55 AM   Result Value Ref Range    WBC 13.0 (H) 3.6 - 11.0 K/uL    RBC 3.97 3.80 - 5.20 M/uL    HGB 11.7 11.5 - 16.0 g/dL    HCT 35.4 35.0 - 47.0 %    MCV 89.2 80.0 - 99.0 FL    MCH 29.5 26.0 - 34.0 PG    MCHC 33.1 30.0 - 36.5 g/dL    RDW 14.3 11.5 - 14.5 %    PLATELET 221 612 - 320 K/uL    MPV 9.4 8.9 - 12.9 FL    NRBC 0.2 (H) 0.0  WBC    ABSOLUTE NRBC 0.02 (H) 0.00 - 0.01 K/uL    NEUTROPHILS 83 (H) 32 - 75 %    LYMPHOCYTES 10 (L) 12 - 49 %    MONOCYTES 4 (L) 5 - 13 %    EOSINOPHILS 1 0 - 7 %    BASOPHILS 0 0 - 1 %    IMMATURE GRANULOCYTES 2 (H) 0 - 0.5 %    ABS. NEUTROPHILS 10.8 (H) 1.8 - 8.0 K/UL    ABS. LYMPHOCYTES 1.3 0.8 - 3.5 K/UL    ABS. MONOCYTES 0.5 0.0 - 1.0 K/UL    ABS. EOSINOPHILS 0.2 0.0 - 0.4 K/UL    ABS. BASOPHILS 0.0 0.0 - 0.1 K/UL    ABS. IMM. GRANS. 0.2 (H) 0.00 - 0.04 K/UL    DF AUTOMATED             Impression: This is an elderly female who is   Admitted because of hypoxic respiratory failure. Chest x-ray showed bilateral airspace disease with elevated WBC count of 29,000 and lactic acid level of 8.2. Plan:   1. Severe sepsis:  Patient had severe sepsis with acute hypoxic respiratory failure. Chest x-ray showed bilateral airspace disease. Have developed aspiration pneumonia causing severe sepsis.   It has shown improvement. Lactic acid level is down to 1.0. Her lactic acid level initially was 8.2  Patient is on IV Zosyn and IV vancomycin. Blood cultures are done, will check any growth. IV fluids volume resuscitation. 2.  Bilateral pneumonia:  Is on IV Zosyn and vancomycin  We will continue to watch WBCs. 3.  Acute hypoxic respiratory failure:  Resolved now. On 1 L of oxygen. Repeat CBC in the morning.     Thank you for involving me in the management of the patient  Total of 30 minutes were spent in patient's evaluation and management     Dwayne Mcdonald MD  [facility]

## 2022-05-26 NOTE — PROGRESS NOTES
5/26/22 1405- Received call from [facility] inquiring about patient. Informed that patient was transferred.

## 2022-05-26 NOTE — PROGRESS NOTES
Hospitalist Progress Note               Daily Progress Note: 5/26/2022      Subjective:   Hospital course to date:  81-year-old female with history of hypertension, GERD, CVA, chronically bedbound, was admitted from [address] [place] [address] on 5/23 with shortness of breath. Patient was hypoxic on presentation. CT showed extensive groundglass infiltrates throughout both lungs concerning for pneumonia. White count was 30,000. proBNP was 5600. She required noninvasive ventilation initially    Patient was admitted, continued on noninvasive ventilation. She was then weaned to high flow nasal cannula    Patient was started on vancomycin and Zosyn for healthcare associated pneumonia    Echocardiogram showed an EF of 55 to 60% with grade 1 diastolic dysfunction    Patient had NIK on admission with a creatinine of 2 which has improved    White count steadily came down. Blood cultures were negative. MRSA screen negative    ------    Patient is seen for follow-up. I am assuming her care. Currently on 1 L nasal cannula    Blood pressure has been in the 90 systolic range.   She has been receiving IV furosemide twice daily since admission    Problem List:  Problem List as of 5/26/2022 Never Reviewed          Codes Class Noted - Resolved    Respiratory failure [facility]) ICD-10-CM: J96.90  ICD-9-CM: 518.81  5/23/2022 - Present              Medications reviewed  Current Facility-Administered Medications   Medication Dose Route Frequency    carvediloL (COREG) tablet 3.125 mg  3.125 mg Oral BID WITH MEALS    vancomycin (VANCOCIN) 1,000 mg in 0.9% sodium chloride 250 mL ([id number])  1,000 mg IntraVENous Q24H    [START ON 5/28/2022] Vancomycin random level 5/28 at 06:00   Other ONCE    zinc oxide-white petrolatum 20-75 % topical paste   Topical TID    piperacillin-tazobactam (ZOSYN) 3.375 g in 0.9% sodium chloride (MBP/ADV) 100 mL MBP  3.375 g IntraVENous Q8H    furosemide (LASIX) injection 40 mg  40 mg IntraVENous BID  sertraline (ZOLOFT) tablet 50 mg  50 mg Oral DAILY    aspirin delayed-release tablet 81 mg  81 mg Oral DAILY    [Held by provider] amLODIPine (NORVASC) tablet 10 mg  10 mg Oral DAILY    sodium chloride (NS) flush 5-40 mL  5-40 mL IntraVENous Q8H    sodium chloride (NS) flush 5-40 mL  5-40 mL IntraVENous PRN    acetaminophen (TYLENOL) tablet 650 mg  650 mg Oral Q6H PRN    Or    acetaminophen (TYLENOL) suppository 650 mg  650 mg Rectal Q6H PRN    polyethylene glycol (MIRALAX) packet 17 g  17 g Oral DAILY PRN    ondansetron (ZOFRAN ODT) tablet 4 mg  4 mg Oral Q8H PRN    Or    ondansetron (ZOFRAN) injection 4 mg  4 mg IntraVENous Q6H PRN    enoxaparin (LOVENOX) injection 30 mg  30 mg SubCUTAneous DAILY    haloperidol lactate (HALDOL) injection 4 mg  4 mg IntraMUSCular Q6H PRN    VANCOMYCIN INFORMATION NOTE   Other Rx Dosing/Monitoring       Review of Systems:   Review of systems not obtained due to patient factors. Objective:   Physical Exam:     Visit Vitals  BP (!) 91/58 (BP 1 Location: Right lower arm, BP Patient Position: Lying)   Pulse 65   Temp 98 °F (36.7 °C)   Resp 20   Ht 5' 5\" (1.651 m)   Wt 74.8 kg (164 lb 14.5 oz)   SpO2 97%   BMI 27.44 kg/m²    O2 Flow Rate (L/min): 1 l/min O2 Device: Nasal cannula    Temp (24hrs), Av.9 °F (36.6 °C), Min:97.4 °F (36.3 °C), Max:98.5 °F (36.9 °C)    No intake/output data recorded.  1901 -  0700  In: -   Out: 6209 [Urine:1650]    General:   Awake and alert, not able to give any history or answer questions   Lungs:   Clear to auscultation bilaterally. Chest wall:  No tenderness or deformity. Heart:  Regular rate and rhythm, S1, S2 normal, no murmur, click, rub or gallop. Abdomen:   Soft, non-tender. Bowel sounds normal. No masses,  No organomegaly. Extremities: Extremities normal, atraumatic, no cyanosis or edema. Pulses: 2+ and symmetric all extremities.    Skin: Skin color, texture, turgor normal. No rashes or lesions   Neurologic: CNII-XII intact. No gross focal deficits         Data Review:       Recent Days:  Recent Labs     05/26/22  0655 05/25/22  0420 05/24/22  0400   WBC 13.0* 25.7* 23.0*   HGB 11.7 12.7 12.3   HCT 35.4 37.7 36.5    316 270     Recent Labs     05/26/22  0655 05/25/22  0855 05/25/22  0420 05/24/22  0400 05/24/22  0400     --  140  --  142   K 2.9* 3.2* Hemolyzed, recollect requested   < > 3.3*     --  108  --  113*   CO2 22  --  20*  --  23   *  --  85  --  125*   BUN 29*  --  31*  --  26*   CREA 0.97  --  1.13*  1.11*  --  1.10*   CA 7.4*  --  7.7*  --  8.0*   MG 1.6  --   --   --   --    ALB  --   --   --   --  2.5*   TBILI  --   --   --   --  0.6   ALT  --   --   --   --  11*    < > = values in this interval not displayed.      Recent Labs     05/25/22 0400 05/24/22  0400   PH 7.38 7.40   PCO2 32* 33*   PO2 66* 86   HCO3 20* 22   FIO2  --  50.0       24 Hour Results:  Recent Results (from the past 24 hour(s))   LACTIC ACID    Collection Time: 05/25/22  6:18 PM   Result Value Ref Range    Lactic acid 1.5 0.4 - 2.0 mmol/L   GLUCOSE, POC    Collection Time: 05/25/22  8:54 PM   Result Value Ref Range    Glucose (POC) 86 65 - 117 mg/dL    Performed by [address] ACID    Collection Time: 05/26/22  6:55 AM   Result Value Ref Range    Lactic acid 1.2 0.4 - 2.0 mmol/L   C REACTIVE PROTEIN, QT    Collection Time: 05/26/22  6:55 AM   Result Value Ref Range    C-Reactive protein 8.84 (H) 0.00 - 2.70 mg/dL   METABOLIC PANEL, BASIC    Collection Time: 05/26/22  6:55 AM   Result Value Ref Range    Sodium 142 136 - 145 mmol/L    Potassium 2.9 (L) 3.5 - 5.1 mmol/L    Chloride 108 97 - 108 mmol/L    CO2 22 21 - 32 mmol/L    Anion gap 12 5 - 15 mmol/L    Glucose 101 (H) 65 - 100 mg/dL    BUN 29 (H) 6 - 20 mg/dL    Creatinine 0.97 0.55 - 1.02 mg/dL    BUN/Creatinine ratio 30 (H) 12 - 20      GFR est AA >60 >60 ml/min/1.73m2    GFR est non-AA 55 (L) >60 ml/min/1.73m2    Calcium 7.4 (L) 8.5 - 10.1 mg/dL   MAGNESIUM    Collection Time: 05/26/22  6:55 AM   Result Value Ref Range    Magnesium 1.6 1.6 - 2.4 mg/dL   VANCOMYCIN, RANDOM    Collection Time: 05/26/22  6:55 AM   Result Value Ref Range    Vancomycin, random 16.9 ug/mL   CBC WITH AUTOMATED DIFF    Collection Time: 05/26/22  6:55 AM   Result Value Ref Range    WBC 13.0 (H) 3.6 - 11.0 K/uL    RBC 3.97 3.80 - 5.20 M/uL    HGB 11.7 11.5 - 16.0 g/dL    HCT 35.4 35.0 - 47.0 %    MCV 89.2 80.0 - 99.0 FL    MCH 29.5 26.0 - 34.0 PG    MCHC 33.1 30.0 - 36.5 g/dL    RDW 14.3 11.5 - 14.5 %    PLATELET 550 722 - 603 K/uL    MPV 9.4 8.9 - 12.9 FL    NRBC 0.2 (H) 0.0  WBC    ABSOLUTE NRBC 0.02 (H) 0.00 - 0.01 K/uL    NEUTROPHILS 83 (H) 32 - 75 %    LYMPHOCYTES 10 (L) 12 - 49 %    MONOCYTES 4 (L) 5 - 13 %    EOSINOPHILS 1 0 - 7 %    BASOPHILS 0 0 - 1 %    IMMATURE GRANULOCYTES 2 (H) 0 - 0.5 %    ABS. NEUTROPHILS 10.8 (H) 1.8 - 8.0 K/UL    ABS. LYMPHOCYTES 1.3 0.8 - 3.5 K/UL    ABS. MONOCYTES 0.5 0.0 - 1.0 K/UL    ABS. EOSINOPHILS 0.2 0.0 - 0.4 K/UL    ABS. BASOPHILS 0.0 0.0 - 0.1 K/UL    ABS. IMM. GRANS. 0.2 (H) 0.00 - 0.04 K/UL    DF AUTOMATED         XR SWALLOW FUNC VIDEO   Final Result      XR CHEST PORT   Final Result      XR CHEST PORT   Final Result      CT CHEST WO CONT   Final Result   1. Extensive bilateral predominantly groundglass infiltrates throughout both   lungs concerning for pneumonia. Edema is felt to be less likely but cannot be   excluded recommend follow-up. 2. Small effusions. XR CHEST PORT   Final Result   Extensive bilateral airspace disease without significant interval change. XR CHEST PORT    (Results Pending)   XR CHEST PORT    (Results Pending)   XR CHEST PORT    (Results Pending)   XR CHEST PORT    (Results Pending)   XR CHEST PORT    (Results Pending)   XR CHEST PORT    (Results Pending)        Assessment:  Bilateral healthcare associated pneumonia, clinically improving    Acute on chronic diastolic heart failure.   Does not appear volume overloaded, she is hypovolemic if anything    Acute respiratory failure with hypoxia, improved    Sepsis, present on admission    History of CVA, chronically bedbound    Organic brain syndrome    Acute kidney injury on admission, improved    Metabolic encephalopathy, improved    Diabetes mellitus type 2    Hypertension    GERD      Plan:  Continue Zosyn, stop vancomycin in light of negative cultures and negative MRSA screen  Continue to wean oxygen  Anticipate discharge back to nursing home in 24 hours  Stop diuretics  Replete potassium    Care Plan discussed with: Nurse    Disposition: SNF in 24 hours    Total time spent with patient: 30 minutes.     Bernabe Moody MD

## 2022-05-26 NOTE — PROGRESS NOTES
Vancomycin Dosing Consult  Day # 4 of vancomycin therapy  Consult ordered by Dr. Hank Ch for this 81 y.o. female for indication of aspiration PNA / sepsis    Antibiotic regimen: Vancomycin + Zosyn    Temp (24hrs), Av.9 °F (36.6 °C), Min:97.4 °F (36.3 °C), Max:98.5 °F (36.9 °C)    Recent Labs     22  0655 22  0420 22  0400   WBC 13.0* 25.7* 23.0*     Recent Labs     22  0655 22  0420 22  0400   CREA 0.97 1.13*  1.11* 1.10*   BUN 29* 31* 26*       Estimated Creatinine Clearance: 42.9 mL/min (based on SCr of 0.97 mg/dL). ml/min  Concomitant nephrotoxic drugs: furosemide    MRSA Swab: Not detected     Target range: -600    Recent level history (3):  Date/Time Dose & Interval Measured Level (mcg/mL) Associated AUC/MARK    @0400 1000mg IV x1  5.4    22 @ 1156 1250 mg X1 16.1    22 @ 1000 1000 mg X1     22 @ 0655 1000 mg IV Q24H  16.9    468     Ht Readings from Last 1 Encounters:   22 165.1 cm (65\")     Wt Readings from Last 1 Encounters:   22 74.8 kg (164 lb 14.5 oz)     Ideal body weight: 57 kg (125 lb 10.6 oz)  Adjusted ideal body weight: 64.1 kg (141 lb 5.7 oz)        Assessment/Plan:   WBC, and Scr  trending down, afebrile  NIK resolving  Will start Vancomycin 1000 mg IV q24h with projected AUC of 468  Pharmacy will order a random level  at 06:00 to monitor.   Antimicrobial stop date TBD

## 2022-05-26 NOTE — PROGRESS NOTES
Progress Note      5/26/2022 7:55 AM  NAME: Shamir Bradford   MRN:  [id number]   Ad[facility] [J96.90]      Problem List:   1.  Incomplete database secondary to patient being a poor historian  2. Vic Chery presents for evaluation of shortness of breath  3.  Acute hypoxic respiratory failure requiring BiPAP on admission  4.  Bilateral aspiration pneumonia  5.  Sepsis  6.  Grade I (mild) diastolic dysfunction, or impaired relaxation  7.  Mild to moderate pulmonary hypertension (RVSP = 44 mmHg)  8.  Previous cerebrovascular accident (CVA)  9.  Hypertension  10.  Type 2 diabetes     11.  Elevated cardiac enzymes in the indeterminate range  12.  Elevated BNP (5580 pg/mL)  13.  Chronic kidney disease  14.  Gastroesophageal reflux disease (GERD)  15.  Debility  16.  Transaminitis  17.  Leukocytosis   18. Electrolyte abnormalities (hypokalemia, and hypocalcemia)       Subjective: The patient is seen and examined in room 481. There were no acute cardiovascular events reported overnight. Currently, she denies any cardiovascular complaints. Specifically, she denies chest pain or shortness of breath. The patient is resting comfortably with supplemental oxygen via nasal cannula.     Medications Personally Reviewed:    Current Facility-Administered Medications   Medication Dose Route Frequency    Vancomycin random level to be drawn on 5/26/22 at 0400 am.   Other ONCE    zinc oxide-white petrolatum 20-75 % topical paste   Topical TID    piperacillin-tazobactam (ZOSYN) 3.375 g in 0.9% sodium chloride (MBP/ADV) 100 mL MBP  3.375 g IntraVENous Q8H    furosemide (LASIX) injection 40 mg  40 mg IntraVENous BID    sertraline (ZOLOFT) tablet 50 mg  50 mg Oral DAILY    atenoloL (TENORMIN) tablet 50 mg  50 mg Oral DAILY    aspirin delayed-release tablet 81 mg  81 mg Oral DAILY    [Held by provider] amLODIPine (NORVASC) tablet 10 mg  10 mg Oral DAILY    sodium chloride (NS) flush 5-40 mL 5-40 mL IntraVENous Q8H    sodium chloride (NS) flush 5-40 mL  5-40 mL IntraVENous PRN    acetaminophen (TYLENOL) tablet 650 mg  650 mg Oral Q6H PRN    Or    acetaminophen (TYLENOL) suppository 650 mg  650 mg Rectal Q6H PRN    polyethylene glycol (MIRALAX) packet 17 g  17 g Oral DAILY PRN    ondansetron (ZOFRAN ODT) tablet 4 mg  4 mg Oral Q8H PRN    Or    ondansetron (ZOFRAN) injection 4 mg  4 mg IntraVENous Q6H PRN    enoxaparin (LOVENOX) injection 30 mg  30 mg SubCUTAneous DAILY    haloperidol lactate (HALDOL) injection 4 mg  4 mg IntraMUSCular Q6H PRN    VANCOMYCIN INFORMATION NOTE   Other Rx Dosing/Monitoring           Objective:      Physical Exam:  Essentially unchanged  Last 24hrs VS reviewed since prior progress note. Most recent are:    Visit Vitals  BP (!) 105/57 (BP 1 Location: Right upper arm, BP Patient Position: Lying)   Pulse 68   Temp 98 °F (36.7 °C)   Resp 20   Ht 5' 5\" (1.651 m)   Wt 74.8 kg (164 lb 14.5 oz)   SpO2 96%   BMI 27.44 kg/m²       Intake/Output Summary (Last 24 hours) at 5/26/2022 0755  Last data filed at 5/25/2022 1509  Gross per 24 hour   Intake --   Output 650 ml   Net -650 ml          Data Review    Telemetry: Sinus rhythm without ventricular ectopy    EKG:   []  No new EKG for review    Lab Data Personally Reviewed:    Recent Labs     05/26/22  0655 05/25/22  0420   WBC 13.0* 25.7*   HGB 11.7 12.7   HCT 35.4 37.7    316     No results for input(s): INR, PTP, APTT, INREXT in the last 72 hours. Recent Labs     05/26/22  0655 05/25/22  0855 05/25/22  0420 05/24/22  0400 05/24/22  0400     --  140  --  142   K 2.9* 3.2* Hemolyzed, recollect requested   < > 3.3*     --  108  --  113*   CO2 22  --  20*  --  23   BUN 29*  --  31*  --  26*   CREA 0.97  --  1.13*  1.11*  --  1.10*   *  --  85  --  125*   CA 7.4*  --  7.7*  --  8.0*   MG 1.6  --   --   --   --     < > = values in this interval not displayed.      No results for input(s): CPK, CKNDX, TROIQ in the last 72 hours. No lab exists for component: CPKMB  No results found for: CHOL, CHOLX, CHLST, CHOLV, HDL, HDLP, LDL, LDLC, DLDLP, TGLX, TRIGL, TRIGP, CHHD, CHHDX    Recent Labs     05/24/22  0400      TP 7.1   ALB 2.5*   GLOB 4.6*     Recent Labs     05/25/22 0400 05/24/22  0400   PH 7.38 7.40   PCO2 32* 33*   PO2 66* 86           Assessment/Plan:   1. Continue telemetry monitor  2. Continue to monitor serum electrolytes, renal function (replace, and recheck serum potassium)  3. Continue to monitor fluid balance, and daily weights  4. Continue current cardiovascular medications including aspirin, enoxaparin, and furosemide  5.   Hold atenolol, and consider carvedilol     Adebayo Go MD

## 2022-05-26 NOTE — PROGRESS NOTES
Problem: Falls - Risk of  Goal: *Absence of Falls  Description: Document Krishna Jacobs Fall Risk and appropriate interventions in the flowsheet. Outcome: Progressing Towards Goal  Note: Fall Risk Interventions:  Mobility Interventions: Bed/chair exit alarm    Mentation Interventions: Bed/chair exit alarm    Medication Interventions: Bed/chair exit alarm    Elimination Interventions: Call light in reach,Bed/chair exit alarm              Problem: Patient Education: Go to Patient Education Activity  Goal: Patient/Family Education  Outcome: Progressing Towards Goal     Problem: Pressure Injury - Risk of  Goal: *Prevention of pressure injury  Description: Document Abiel Scale and appropriate interventions in the flowsheet.   Outcome: Progressing Towards Goal  Note: Pressure Injury Interventions:  Sensory Interventions: Minimize linen layers    Moisture Interventions: Minimize layers,Moisture barrier    Activity Interventions: PT/OT evaluation    Mobility Interventions: PT/OT evaluation    Nutrition Interventions: Document food/fluid/supplement intake    Friction and Shear Interventions: Foam dressings/transparent film/skin sealants,Apply protective barrier, creams and emollients,Lift team/patient mobility team,Minimize layers,Transferring/repositioning devices                Problem: Patient Education: Go to Patient Education Activity  Goal: Patient/Family Education  Outcome: Progressing Towards Goal     Problem: Patient Education: Go to Patient Education Activity  Goal: Patient/Family Education  Outcome: Progressing Towards Goal

## 2022-05-27 VITALS
RESPIRATION RATE: 20 BRPM | HEART RATE: 64 BPM | SYSTOLIC BLOOD PRESSURE: 115 MMHG | OXYGEN SATURATION: 94 % | WEIGHT: 164.9 LBS | HEIGHT: 65 IN | TEMPERATURE: 98.9 F | DIASTOLIC BLOOD PRESSURE: 83 MMHG | BODY MASS INDEX: 27.47 KG/M2

## 2022-05-27 LAB
ANION GAP SERPL CALC-SCNC: 9 MMOL/L (ref 5–15)
BASOPHILS # BLD: 0 K/UL (ref 0–0.1)
BASOPHILS NFR BLD: 0 % (ref 0–1)
BUN SERPL-MCNC: 17 MG/DL (ref 6–20)
BUN/CREAT SERPL: 22 (ref 12–20)
CA-I BLD-MCNC: 7.2 MG/DL (ref 8.5–10.1)
CHLORIDE SERPL-SCNC: 111 MMOL/L (ref 97–108)
CO2 SERPL-SCNC: 23 MMOL/L (ref 21–32)
CREAT SERPL-MCNC: 0.77 MG/DL (ref 0.55–1.02)
DIFFERENTIAL METHOD BLD: ABNORMAL
EOSINOPHIL # BLD: 0.4 K/UL (ref 0–0.4)
EOSINOPHIL NFR BLD: 4 % (ref 0–7)
ERYTHROCYTE [DISTWIDTH] IN BLOOD BY AUTOMATED COUNT: 14.1 % (ref 11.5–14.5)
GLUCOSE SERPL-MCNC: 100 MG/DL (ref 65–100)
HCT VFR BLD AUTO: 33.1 % (ref 35–47)
HGB BLD-MCNC: 11.3 G/DL (ref 11.5–16)
IMM GRANULOCYTES # BLD AUTO: 0 K/UL
IMM GRANULOCYTES NFR BLD AUTO: 0 %
LYMPHOCYTES # BLD: 2 K/UL (ref 0.8–3.5)
LYMPHOCYTES NFR BLD: 19 % (ref 12–49)
MAGNESIUM SERPL-MCNC: 1.7 MG/DL (ref 1.6–2.4)
MCH RBC QN AUTO: 29.9 PG (ref 26–34)
MCHC RBC AUTO-ENTMCNC: 34.1 G/DL (ref 30–36.5)
MCV RBC AUTO: 87.6 FL (ref 80–99)
METAMYELOCYTES NFR BLD MANUAL: 1 %
MONOCYTES # BLD: 0.4 K/UL (ref 0–1)
MONOCYTES NFR BLD: 4 % (ref 5–13)
NEUTS BAND NFR BLD MANUAL: 1 % (ref 0–6)
NEUTS SEG # BLD: 7.6 K/UL (ref 1.8–8)
NEUTS SEG NFR BLD: 71 % (ref 32–75)
NRBC # BLD: 0.04 K/UL (ref 0–0.01)
NRBC # BLD: 0.1 K/UL
NRBC BLD MANUAL-RTO: 1 PER 100 WBC
NRBC BLD-RTO: 0.4 PER 100 WBC
PLATELET # BLD AUTO: 260 K/UL (ref 150–400)
PMV BLD AUTO: 9.5 FL (ref 8.9–12.9)
POTASSIUM SERPL-SCNC: 2.9 MMOL/L (ref 3.5–5.1)
RBC # BLD AUTO: 3.78 M/UL (ref 3.8–5.2)
RBC MORPH BLD: ABNORMAL
SODIUM SERPL-SCNC: 143 MMOL/L (ref 136–145)
WBC # BLD AUTO: 10.4 K/UL (ref 3.6–11)

## 2022-05-27 PROCEDURE — 74011250636 HC RX REV CODE- 250/636: Performed by: HOSPITALIST

## 2022-05-27 PROCEDURE — 83735 ASSAY OF MAGNESIUM: CPT

## 2022-05-27 PROCEDURE — 36415 COLL VENOUS BLD VENIPUNCTURE: CPT

## 2022-05-27 PROCEDURE — 92526 ORAL FUNCTION THERAPY: CPT

## 2022-05-27 PROCEDURE — 74011250637 HC RX REV CODE- 250/637: Performed by: INTERNAL MEDICINE

## 2022-05-27 PROCEDURE — 74011250637 HC RX REV CODE- 250/637: Performed by: HOSPITALIST

## 2022-05-27 PROCEDURE — 74011000258 HC RX REV CODE- 258: Performed by: INTERNAL MEDICINE

## 2022-05-27 PROCEDURE — 77010033678 HC OXYGEN DAILY

## 2022-05-27 PROCEDURE — 85025 COMPLETE CBC W/AUTO DIFF WBC: CPT

## 2022-05-27 PROCEDURE — 94761 N-INVAS EAR/PLS OXIMETRY MLT: CPT

## 2022-05-27 PROCEDURE — 74011000250 HC RX REV CODE- 250: Performed by: HOSPITALIST

## 2022-05-27 PROCEDURE — 80048 BASIC METABOLIC PNL TOTAL CA: CPT

## 2022-05-27 PROCEDURE — 74011250636 HC RX REV CODE- 250/636: Performed by: INTERNAL MEDICINE

## 2022-05-27 RX ORDER — CARVEDILOL 3.12 MG/1
3.12 TABLET ORAL 2 TIMES DAILY WITH MEALS
Qty: 60 TABLET | Refills: 0 | Status: SHIPPED
Start: 2022-05-27

## 2022-05-27 RX ORDER — AMOXICILLIN AND CLAVULANATE POTASSIUM 400; 57 MG/5ML; MG/5ML
10 POWDER, FOR SUSPENSION ORAL 2 TIMES DAILY
Qty: 100 ML | Refills: 0 | Status: SHIPPED
Start: 2022-05-27 | End: 2022-06-01

## 2022-05-27 RX ADMIN — SERTRALINE HYDROCHLORIDE 50 MG: 50 TABLET ORAL at 08:53

## 2022-05-27 RX ADMIN — WHITE PETROLATUM,ZINC OXIDE: 20; 75 PASTE TOPICAL at 09:00

## 2022-05-27 RX ADMIN — ENOXAPARIN SODIUM 30 MG: 100 INJECTION SUBCUTANEOUS at 08:54

## 2022-05-27 RX ADMIN — WHITE PETROLATUM,ZINC OXIDE: 20; 75 PASTE TOPICAL at 16:00

## 2022-05-27 RX ADMIN — SODIUM CHLORIDE, PRESERVATIVE FREE 10 ML: 5 INJECTION INTRAVENOUS at 14:22

## 2022-05-27 RX ADMIN — PIPERACILLIN AND TAZOBACTAM 3.38 G: 3; .375 INJECTION, POWDER, LYOPHILIZED, FOR SOLUTION INTRAVENOUS at 09:02

## 2022-05-27 RX ADMIN — ASPIRIN 81 MG: 81 TABLET, COATED ORAL at 08:53

## 2022-05-27 RX ADMIN — POTASSIUM CHLORIDE 20 MEQ: 1.5 FOR SOLUTION ORAL at 08:53

## 2022-05-27 RX ADMIN — CARVEDILOL 3.12 MG: 3.12 TABLET, FILM COATED ORAL at 17:54

## 2022-05-27 RX ADMIN — SODIUM CHLORIDE, PRESERVATIVE FREE 10 ML: 5 INJECTION INTRAVENOUS at 05:19

## 2022-05-27 RX ADMIN — WHITE PETROLATUM,ZINC OXIDE: 20; 75 PASTE TOPICAL at 22:00

## 2022-05-27 RX ADMIN — PIPERACILLIN AND TAZOBACTAM 3.38 G: 3; .375 INJECTION, POWDER, LYOPHILIZED, FOR SOLUTION INTRAVENOUS at 01:17

## 2022-05-27 RX ADMIN — CARVEDILOL 3.12 MG: 3.12 TABLET, FILM COATED ORAL at 08:54

## 2022-05-27 RX ADMIN — POTASSIUM CHLORIDE 20 MEQ: 1.5 FOR SOLUTION ORAL at 17:54

## 2022-05-27 NOTE — PROGRESS NOTES
SPEECH LANGUAGE PATHOLOGY DYSPHAGIA TREATMENT  Patient: Joycelyn Apley (18 y.o. female)  Date: 5/27/2022  Diagnosis: Respiratory failure ([facility] [J96.90] <principal problem not specified>       Precautions: aspiration     ASSESSMENT:  Patient seen at bedside. She is [place]. She reports tolerating present diet modification of SBS, thin liquids. MBS was completed yesterday w/ no aspiration or penetration observed. Patient requesting cake and ice cream to eat. Administered thin liquids via straw. Swallow initiation timely. HLE and protraction adequate. No overt s/sx of aspiration observed. Patient does exhibit belching after the swallow. Mastication WFL for age and sparse dentition of solid. No oral residual. Pharyngeal response WFL. Patient is tolerating LRD of SBS, thin liquids at this time. No further acute ST services to follow. PLAN:  Recommendations and Planned Interventions:  Soft and bite size, thin liquids. Aspiration precautions . GERD precautions. GI consult. No further acute ST services to follow. Discharge Recommendations:  Skilled Nursing Facility     SUBJECTIVE:   Patient alert and seen at bedside. \" I want some cake. \" . OBJECTIVE:     CXR Results  (Last 48 hours)                 05/26/22 0813  XR CHEST PORT Final result    Narrative:  Chest single view. Comparison single view chest May 25, 2022. Persistent hazy reticular markings throughout lungs. Consider pulmonary   interstitial edema cardiogenic and/or inflammatory/infectious origin. Cardiac   and mediastinal structures unchanged. No pneumothorax or sizable pleural   effusion.              CT Results  (Last 48 hours)      None           Cognitive and Communication Status:  Neurologic State: Alert,Confused  Orientation Level: Disoriented to situation,Disoriented to time,Oriented to person,Oriented to place  Cognition: Decreased attention/concentration,Follows commands          Pain:  Pain Scale 1: Numeric (0 - 10)  Pain Intensity 1: 0       After treatment:   Patient left in no apparent distress in bed and Call bell within reach    COMMUNICATION/EDUCATION:   Patient was educated regarding her deficit(s) of dysphagia, swallow safety precautions, diet recs and POC. She demonstrated Good understanding as evidenced by verbal understanding. Boy Brasher SLP M.S. CCC-SLP  Time Calculation: 8 mins           Problem: Dysphagia (Adult)  Goal: *Acute Goals and Plan of Care (Insert Text)  Description: Speech Therapy Swallow Goals  Initiated 5/25/2022  -Patient will tolerate SBS diet with thin liquids without clinical indicators of aspiration given minimal cues within 5-7 day(s). -Patient will tolerate PO trials without clinical indicators of aspiration given minimal cues within 5-7day(s). -Patient will participate in modified barium swallow study within 1-3 day(s). -Patient will demonstrate understanding of swallow safety precautions and aspiration precautions, diet recs with minimal cues within 5-7 day(s).           Outcome: Resolved/Met

## 2022-05-27 NOTE — PROGRESS NOTES
1617 - IV and tele removed. Report called to Lila Suresh at [facility]. CM is arranging trans portion.

## 2022-05-27 NOTE — DISCHARGE SUMMARY
Physician Discharge Summary     Patient ID:    Usha Cuevas  [id number]  81 y.o.  1935    Admit date: 5/23/2022    Discharge date : 5/27/2022    Chronic Diagnoses:    Problem List as of 5/27/2022 Never Reviewed          Codes Class Noted - Resolved    Respiratory failure [facility]) ICD-10-CM: J96.90  ICD-9-CM: 518.81  5/23/2022 - Present          22    Final Diagnoses:   Bilateral healthcare associated pneumonia, clinically improving     Acute on chronic diastolic heart failure. Does not appear volume overloaded, she is hypovolemic if anything     Acute respiratory failure with hypoxia, improved     Sepsis, present on admission     History of CVA, chronically bedbound     Organic brain syndrome     Acute kidney injury on admission, improved     Metabolic encephalopathy, improved     Diabetes mellitus type 2     Hypertension     GERD       Reason for Hospitalization:  67-year-old female with history of hypertension, GERD, CVA, chronically bedbound, was admitted from [address] [place] [address] on 5/23 with shortness of breath. Patient was hypoxic on presentation. CT showed extensive groundglass infiltrates throughout both lungs concerning for pneumonia. White count was 30,000. proBNP was 5600. She required noninvasive ventilation initially      Hospital Course:   Patient was admitted, continued on noninvasive ventilation. She was then weaned to high flow nasal cannula     Patient was started on vancomycin and Zosyn for healthcare associated pneumonia     Echocardiogram showed an EF of 55 to 60% with grade 1 diastolic dysfunction     Patient had NIK on admission with a creatinine of 2 which has improved     White count steadily came down. Blood cultures were negative. MRSA screen negative     Patient was ultimately weaned to room air    She was felt stable for discharge to the nursing home on 5/27.   We are transitioning to oral Augmentin              Discharge Medications:   Current Discharge Medication List      START taking these medications    Details   carvediloL (COREG) 3.125 mg tablet Take 1 Tablet by mouth two (2) times daily (with meals). Indications: high blood pressure  Qty: 60 Tablet, Refills: 0  Start date: 5/27/2022      amoxicillin-clavulanate (AUGMENTIN) 400-57 mg/5 mL suspension Take 10 mL by mouth two (2) times a day for 5 days. Qty: 100 mL, Refills: 0  Start date: 5/27/2022, End date: 6/1/2022    Comments: Penicillin allergy reported but patient tolerated piperacillin without any problem         CONTINUE these medications which have NOT CHANGED    Details   aspirin delayed-release 81 mg tablet Take 81 mg by mouth daily. latanoprost (XALATAN) 0.005 % ophthalmic solution Administer 1 Drop to both eyes nightly. losartan (COZAAR) 50 mg tablet Take 50 mg by mouth daily. omeprazole (PRILOSEC) 20 mg capsule Take 20 mg by mouth daily. potassium chloride SR (KLOR-CON 10) 10 mEq tablet Take 20 mEq by mouth daily. sertraline (ZOLOFT) 50 mg tablet Take 50 mg by mouth daily. Indications: major depressive disorder      acetaminophen (TYLENOL) 500 mg tablet Take 500 mg by mouth every four (4) hours as needed for Pain. loperamide (IMODIUM) 2 mg capsule Take 4 mg by mouth as needed for Diarrhea. Give 2 capsules by mouth every hour as needed for diarrhea after each loose stool up to 4 doses         STOP taking these medications       amLODIPine (NORVASC) 10 mg tablet Comments:   Reason for Stopping:         atenoloL (TENORMIN) 50 mg tablet Comments:   Reason for Stopping: Follow up Care:    1. Charisse Gayle MD in 1-2 weeks. Please call to set up an appointment shortly after discharge. Diet:  Regular Diet    Disposition:  SNF. Advanced Directive:   FULL    DNR      Discharge Exam:  General:  Alert, cooperative, no distress, appears stated age. Lungs:   Clear to auscultation bilaterally. Chest wall:  No tenderness or deformity.    Heart: Regular rate and rhythm, S1, S2 normal, no murmur, click, rub or gallop. Abdomen:   Soft, non-tender. Bowel sounds normal. No masses,  No organomegaly. Extremities: Extremities normal, atraumatic, no cyanosis or edema. Pulses: 2+ and symmetric all extremities. Skin: Skin color, texture, turgor normal. No rashes or lesions   Neurologic: CNII-XII intact. No gross sensory or motor deficits        CONSULTATIONS: Cardiology    Significant Diagnostic Studies:   5/23/2022: BUN 24 mg/dL (H; Ref range: 6 - 20 mg/dL); Calcium 8.8 mg/dL (Ref range: 8.5 - 10.1 mg/dL); CO2 31 mmol/L (Ref range: 21 - 32 mmol/L); Creatinine 2.00 mg/dL (H; Ref range: 0.55 - 1.02 mg/dL); Glucose 104 mg/dL (H; Ref range: 65 - 100 mg/dL); HCT 38.1 % (Ref range: 36 - 46 %); HGB 12.5 g/dL (L; Ref range: 13.5 - 17.5 g/dL); Potassium 3.9 mmol/L (Ref range: 3.5 - 5.1 mmol/L); Sodium 139 mmol/L (Ref range: 136 - 145 mmol/L)  5/24/2022: BUN 26 mg/dL (H; Ref range: 6 - 20 mg/dL); Calcium 8.0 mg/dL (L; Ref range: 8.5 - 10.1 mg/dL); CO2 23 mmol/L (Ref range: 21 - 32 mmol/L); Creatinine 1.10 mg/dL (H; Ref range: 0.55 - 1.02 mg/dL); Glucose 125 mg/dL (H; Ref range: 65 - 100 mg/dL); HCT 36.5 % (Ref range: 35.0 - 47.0 %); HGB 12.3 g/dL (Ref range: 11.5 - 16.0 g/dL); Potassium 3.3 mmol/L (L; Ref range: 3.5 - 5.1 mmol/L); Sodium 142 mmol/L (Ref range: 136 - 145 mmol/L)  Recent Labs     05/27/22  0826 05/26/22  0655   WBC 10.4 13.0*   HGB 11.3* 11.7   HCT 33.1* 35.4    279     Recent Labs     05/27/22  0826 05/26/22  0655 05/25/22  0855 05/25/22 0420 05/25/22 0420    142  --   --  140   K 2.9* 2.9* 3.2*   < > Hemolyzed, recollect requested   * 108  --   --  108   CO2 23 22  --   --  20*   BUN 17 29*  --   --  31*   CREA 0.77 0.97  --   --  1.13*  1.11*    101*  --   --  85   CA 7.2* 7.4*  --   --  7.7*   MG 1.7 1.6  --   --   --     < > = values in this interval not displayed.      No results for input(s): ALT, AP, TBIL, TBILI, TP, ALB, GLOB, GGT, AML, LPSE in the last 72 hours. No lab exists for component: SGOT, GPT, AMYP, HLPSE  No results for input(s): INR, PTP, APTT, INREXT in the last 72 hours. No results for input(s): FE, TIBC, PSAT, FERR in the last 72 hours. Recent Labs     05/25/22  0400   PH 7.38   PCO2 32*   PO2 66*     No results for input(s): CPK, CKMB in the last 72 hours.     No lab exists for component: TROPONINI  Lab Results   Component Value Date/Time    Glucose (POC) 86 05/25/2022 08:54 PM    Glucose (POC) 78 05/25/2022 11:43 AM       Discharge time spent 35 minutes    Signed:  Emmanuel Padilla MD  5/27/2022  3:45 PM

## 2022-05-27 NOTE — PROGRESS NOTES
Problem: Falls - Risk of  Goal: *Absence of Falls  Description: Document Leeanna Forbes Fall Risk and appropriate interventions in the flowsheet. Outcome: Progressing Towards Goal  Note: Fall Risk Interventions:  Mobility Interventions: Bed/chair exit alarm,Patient to call before getting OOB    Mentation Interventions: Adequate sleep, hydration, pain control,Bed/chair exit alarm,More frequent rounding,Reorient patient    Medication Interventions: Bed/chair exit alarm,Patient to call before getting OOB,Teach patient to arise slowly    Elimination Interventions: Bed/chair exit alarm,Call light in reach,Patient to call for help with toileting needs,Toileting schedule/hourly rounds              Problem: Patient Education: Go to Patient Education Activity  Goal: Patient/Family Education  Outcome: Progressing Towards Goal     Problem: Pressure Injury - Risk of  Goal: *Prevention of pressure injury  Description: Document Abiel Scale and appropriate interventions in the flowsheet. Outcome: Progressing Towards Goal  Note: Pressure Injury Interventions:  Sensory Interventions: Assess changes in LOC,Float heels,Keep linens dry and wrinkle-free,Monitor skin under medical devices,Pressure redistribution bed/mattress (bed type),Turn and reposition approx. every two hours (pillows and wedges if needed)    Moisture Interventions: Absorbent underpads,Apply protective barrier, creams and emollients,Check for incontinence Q2 hours and as needed,Minimize layers    Activity Interventions: Increase time out of bed,PT/OT evaluation,Pressure redistribution bed/mattress(bed type)    Mobility Interventions: HOB 30 degrees or less,Pressure redistribution bed/mattress (bed type),PT/OT evaluation,Turn and reposition approx.  every two hours(pillow and wedges)    Nutrition Interventions: Document food/fluid/supplement intake,Offer support with meals,snacks and hydration    Friction and Shear Interventions: Apply protective barrier, creams and emollients,HOB 30 degrees or less,Minimize layers,Transferring/repositioning devices                Problem: Patient Education: Go to Patient Education Activity  Goal: Patient/Family Education  Outcome: Progressing Towards Goal

## 2022-05-27 NOTE — PROGRESS NOTES
Patient is clear to d/c from [facility] to her LTC facility, [facility] room [facility], nurse report can be called to [phone].  contacted patient's brother to notify, Hawa Richardson [phone], he is agreeable to d/c this date. Medicare pt has received, reviewed, and signed 2nd IM letter informing them of their right to appeal the discharge. Signed copied has been placed on pt bedside chart.

## 2022-05-27 NOTE — PROGRESS NOTES
Progress Note      5/27/2022 8:50 AM  NAME: Titus Mckinley   MRN:  [id number]   [facility] [J96.90[facility]      Problem List:   1.  Incomplete database secondary to patient being a poor historian  2. Glendy Viera presents for evaluation of shortness of breath  3.  Acute hypoxic respiratory failure requiring BiPAP on admission  4.  Bilateral aspiration pneumonia  5.  Sepsis  6.  Grade I (mild) diastolic dysfunction, or impaired relaxation  7.  Mild to moderate pulmonary hypertension (RVSP = 44 mmHg)  8.  Previous cerebrovascular accident (CVA)  9.  Hypertension  10.  Type 2 diabetes     11.  Elevated cardiac enzymes in the indeterminate range  12.  Elevated BNP (5580 pg/mL)  13.  Chronic kidney disease  14.  Gastroesophageal reflux disease (GERD)  15.  Debility  16.  Transaminitis  17.  Leukocytosis   18. Electrolyte abnormalities (hypokalemia, and hypocalcemia)       Subjective: The patient is seen and examined in room 481. There were no acute cardiovascular events reported overnight. Currently, she denies any cardiovascular complaints. The patient is examined without supplemental oxygen.     Medications Personally Reviewed:    Current Facility-Administered Medications   Medication Dose Route Frequency    carvediloL (COREG) tablet 3.125 mg  3.125 mg Oral BID WITH MEALS    potassium chloride (KLOR-CON) packet for solution 20 mEq  20 mEq Oral BID WITH MEALS    zinc oxide-white petrolatum 20-75 % topical paste   Topical TID    piperacillin-tazobactam (ZOSYN) 3.375 g in 0.9% sodium chloride (MBP/ADV) 100 mL MBP  3.375 g IntraVENous Q8H    [Held by provider] furosemide (LASIX) injection 40 mg  40 mg IntraVENous BID    sertraline (ZOLOFT) tablet 50 mg  50 mg Oral DAILY    aspirin delayed-release tablet 81 mg  81 mg Oral DAILY    [Held by provider] amLODIPine (NORVASC) tablet 10 mg  10 mg Oral DAILY    sodium chloride (NS) flush 5-40 mL  5-40 mL IntraVENous Q8H    sodium chloride (NS) flush 5-40 mL  5-40 mL IntraVENous PRN    acetaminophen (TYLENOL) tablet 650 mg  650 mg Oral Q6H PRN    Or    acetaminophen (TYLENOL) suppository 650 mg  650 mg Rectal Q6H PRN    polyethylene glycol (MIRALAX) packet 17 g  17 g Oral DAILY PRN    ondansetron (ZOFRAN ODT) tablet 4 mg  4 mg Oral Q8H PRN    Or    ondansetron (ZOFRAN) injection 4 mg  4 mg IntraVENous Q6H PRN    enoxaparin (LOVENOX) injection 30 mg  30 mg SubCUTAneous DAILY    haloperidol lactate (HALDOL) injection 4 mg  4 mg IntraMUSCular Q6H PRN           Objective:      Physical Exam:  As above, otherwise noncontributory  Last 24hrs VS reviewed since prior progress note. Most recent are:    Visit Vitals  /75 (BP 1 Location: Right lower arm, BP Patient Position: Lying)   Pulse 74   Temp 98.6 °F (37 °C)   Resp 18   Ht 5' 5\" (1.651 m)   Wt 74.8 kg (164 lb 14.5 oz)   SpO2 97%   BMI 27.44 kg/m²     No intake or output data in the 24 hours ending 05/27/22 0850       Data Review    Telemetry: Sinus rhythm without ventricular ectopy    EKG:   []  No new EKG for review    Lab Data Personally Reviewed:    Recent Labs     05/26/22  0655 05/25/22  0420   WBC 13.0* 25.7*   HGB 11.7 12.7   HCT 35.4 37.7    316     No results for input(s): INR, PTP, APTT, INREXT in the last 72 hours. Recent Labs     05/26/22  0655 05/25/22  0855 05/25/22  0420     --  140   K 2.9* 3.2* Hemolyzed, recollect requested     --  108   CO2 22  --  20*   BUN 29*  --  31*   CREA 0.97  --  1.13*  1.11*   *  --  85   CA 7.4*  --  7.7*   MG 1.6  --   --      No results for input(s): CPK, CKNDX, TROIQ in the last 72 hours. No lab exists for component: CPKMB  No results found for: CHOL, CHOLX, CHLST, CHOLV, HDL, HDLP, LDL, LDLC, DLDLP, TGLX, TRIGL, TRIGP, CHHD, CHHDX    No results for input(s): AP, TBIL, TP, ALB, GLOB, GGT, AML, LPSE in the last 72 hours.     No lab exists for component: SGOT, GPT, AMYP, HLPSE  Recent Labs 05/25/22  0400   PH 7.38   PCO2 32*   PO2 66*           Assessment/Plan:   1. Continue telemetry monitor  2. Continue to monitor serum electrolytes, renal function (replace, and recheck serum potassium)  3. Continue to monitor fluid balance, and daily weights  4. Continue current cardiovascular medications including aspirin, enoxaparin, and furosemide  5. No further cardiac testing needed at this time    6.   Will sign off, but remain available as needed     Marquis Goss MD

## 2022-05-27 NOTE — PROGRESS NOTES
Pulmonary/ CC Consult  This is an 81years old female was from nursing home with got transferred to hospital and got admitted in ICU for respiratory failure. She has history of hypertension, GERD, CVA and organic brain syndrome. She is bedbound. It is reported that she was short of breath over the last 24 hours and which got worse in nursing home. Rebreather facemask. She was placed on BiPAP which resulted in improvement in her saturations. Her chest x-ray showed bilateral infiltrates. Subjective:     Patient is evaluated at bedside. Shown significant improvement in respiratory distress. She is now switched to nasal cannula oxygen. On 1 L of oxygen. Alert and responding well. On room air now. Patient Active Problem List   Diagnosis Code    Respiratory failure ([facility] [address] J96.90     Past Medical History:   Diagnosis Date    [facility]     Gastrointestinal disorder     Hypertension     Stroke [facility])       History reviewed. No pertinent family history. Social History     Tobacco Use    Smoking status: Never Smoker    Smokeless tobacco: Never Used   Substance Use Topics    Alcohol use: Not on file     History reviewed. No pertinent surgical history. Prior to Admission medications    Medication Sig Start Date End Date Taking? Authorizing Provider   carvediloL (COREG) 3.125 mg tablet Take 1 Tablet by mouth two (2) times daily (with meals). Indications: high blood pressure 5/27/22  Yes Dyan Lutz MD   amoxicillin-clavulanate (AUGMENTIN) 400-57 mg/5 mL suspension Take 10 mL by mouth two (2) times a day for 5 days. 5/27/22 6/1/22 Yes Dyan Lutz MD   amLODIPine (NORVASC) 10 mg tablet Take 10 mg by mouth daily. Yes Provider, Historical   aspirin delayed-release 81 mg tablet Take 81 mg by mouth daily. Yes Provider, Historical   atenoloL (TENORMIN) 50 mg tablet Take 50 mg by mouth daily.    Yes Provider, Historical   latanoprost (XALATAN) 0.005 % ophthalmic solution Administer 1 Drop to both eyes nightly. Yes Provider, Historical   losartan (COZAAR) 50 mg tablet Take 50 mg by mouth daily. Yes Provider, Historical   omeprazole (PRILOSEC) 20 mg capsule Take 20 mg by mouth daily. Yes Provider, Historical   potassium chloride SR (KLOR-CON 10) 10 mEq tablet Take 20 mEq by mouth daily. Yes Provider, Historical   sertraline (ZOLOFT) 50 mg tablet Take 50 mg by mouth daily. Indications: major depressive disorder   Yes Provider, Historical   acetaminophen (TYLENOL) 500 mg tablet Take 500 mg by mouth every four (4) hours as needed for Pain. Yes Provider, Historical   loperamide (IMODIUM) 2 mg capsule Take 4 mg by mouth as needed for Diarrhea. Give 2 capsules by mouth every hour as needed for diarrhea after each loose stool up to 4 doses   Yes Provider, Historical     Allergies   Allergen Reactions    Pcn [Penicillins] Unknown (comments)        Review of Systems:      Objective:   Blood pressure (!) 113/59, pulse 67, temperature 99.1 °F (37.3 °C), resp. rate 18, height 5' 5\" (1.651 m), weight 74.8 kg (164 lb 14.5 oz), SpO2 93 %. Temp (24hrs), Av.4 °F (36.9 °C), Min:97.6 °F (36.4 °C), Max:99.1 °F (37.3 °C)    XR SWALLOW FUNC VIDEO   Final Result      XR CHEST PORT   Final Result      XR CHEST PORT   Final Result      CT CHEST WO CONT   Final Result   1. Extensive bilateral predominantly groundglass infiltrates throughout both   lungs concerning for pneumonia. Edema is felt to be less likely but cannot be   excluded recommend follow-up. 2. Small effusions. XR CHEST PORT   Final Result   Extensive bilateral airspace disease without significant interval change. Chest x-ray results were reviewed showing  Nonspecific hazy alveolar opacities through the lungs; overall reduced compared  to prior imaging. Cardiac and mediastinal structures unchanged. No pneumothorax  or sizable pleural effusion.     Data Review:   Current Facility-Administered Medications   Medication Dose Route Frequency    carvediloL (COREG) tablet 3.125 mg  3.125 mg Oral BID WITH MEALS    potassium chloride (KLOR-CON) packet for solution 20 mEq  20 mEq Oral BID WITH MEALS    zinc oxide-white petrolatum 20-75 % topical paste   Topical TID    piperacillin-tazobactam (ZOSYN) 3.375 g in 0.9% sodium chloride (MBP/ADV) 100 mL MBP  3.375 g IntraVENous Q8H    [Held by provider] furosemide (LASIX) injection 40 mg  40 mg IntraVENous BID    sertraline (ZOLOFT) tablet 50 mg  50 mg Oral DAILY    aspirin delayed-release tablet 81 mg  81 mg Oral DAILY    [Held by provider] amLODIPine (NORVASC) tablet 10 mg  10 mg Oral DAILY    sodium chloride (NS) flush 5-40 mL  5-40 mL IntraVENous Q8H    sodium chloride (NS) flush 5-40 mL  5-40 mL IntraVENous PRN    acetaminophen (TYLENOL) tablet 650 mg  650 mg Oral Q6H PRN    Or    acetaminophen (TYLENOL) suppository 650 mg  650 mg Rectal Q6H PRN    polyethylene glycol (MIRALAX) packet 17 g  17 g Oral DAILY PRN    ondansetron (ZOFRAN ODT) tablet 4 mg  4 mg Oral Q8H PRN    Or    ondansetron (ZOFRAN) injection 4 mg  4 mg IntraVENous Q6H PRN    enoxaparin (LOVENOX) injection 30 mg  30 mg SubCUTAneous DAILY    haloperidol lactate (HALDOL) injection 4 mg  4 mg IntraMUSCular Q6H PRN        Exam:      This is an elderly female who is awake and responding appropriately . Neck is supple. No cervical lymphadenopathy. Thyroid not large  Chest: Bilateral rhonchi. Currently on BiPAP. Abdomen: Soft, nontender, no visceromegaly  Heart: S1-S2, patient is tachycardic  Neuro: Neurological examination.   Patient is moving all her extremities        Recent Results (from the past 24 hour(s))   CBC WITH AUTOMATED DIFF    Collection Time: 05/27/22  8:26 AM   Result Value Ref Range    WBC 10.4 3.6 - 11.0 K/uL    RBC 3.78 (L) 3.80 - 5.20 M/uL    HGB 11.3 (L) 11.5 - 16.0 g/dL    HCT 33.1 (L) 35.0 - 47.0 %    MCV 87.6 80.0 - 99.0 FL    MCH 29.9 26.0 - 34.0 PG    MCHC 34.1 30.0 - 36.5 g/dL    RDW 14.1 11.5 - 14.5 %    PLATELET 569 281 - 498 K/uL    MPV 9.5 8.9 - 12.9 FL    NRBC 0.4 (H) 0.0  WBC    ABSOLUTE NRBC 0.04 (H) 0.00 - 0.01 K/uL    NEUTROPHILS 71 32 - 75 %    BAND NEUTROPHILS 1 0 - 6 %    LYMPHOCYTES 19 12 - 49 %    MONOCYTES 4 (L) 5 - 13 %    EOSINOPHILS 4 0 - 7 %    BASOPHILS 0 0 - 1 %    METAMYELOCYTES 1 (H) 0 %    NRBC 1.0  WBC    IMMATURE GRANULOCYTES 0 %    ABS. NEUTROPHILS 7.6 1.8 - 8.0 K/UL    ABS. LYMPHOCYTES 2.0 0.8 - 3.5 K/UL    ABS. MONOCYTES 0.4 0.0 - 1.0 K/UL    ABS. EOSINOPHILS 0.4 0.0 - 0.4 K/UL    ABS. BASOPHILS 0.0 0.0 - 0.1 K/UL    ABSOLUTE NRBC 0.10 K/uL    ABS. IMM. GRANS. 0.0 K/UL    DF Manual      RBC COMMENTS Normocytic, Normochromic     METABOLIC PANEL, BASIC    Collection Time: 05/27/22  8:26 AM   Result Value Ref Range    Sodium 143 136 - 145 mmol/L    Potassium 2.9 (L) 3.5 - 5.1 mmol/L    Chloride 111 (H) 97 - 108 mmol/L    CO2 23 21 - 32 mmol/L    Anion gap 9 5 - 15 mmol/L    Glucose 100 65 - 100 mg/dL    BUN 17 6 - 20 mg/dL    Creatinine 0.77 0.55 - 1.02 mg/dL    BUN/Creatinine ratio 22 (H) 12 - 20      GFR est AA >60 >60 ml/min/1.73m2    GFR est non-AA >60 >60 ml/min/1.73m2    Calcium 7.2 (L) 8.5 - 10.1 mg/dL   MAGNESIUM    Collection Time: 05/27/22  8:26 AM   Result Value Ref Range    Magnesium 1.7 1.6 - 2.4 mg/dL           Impression: This is an elderly female who is   Admitted because of hypoxic respiratory failure. Chest x-ray showed bilateral airspace disease with elevated WBC count of 29,000 and lactic acid level of 8.2. Plan:   1. Severe sepsis:  Patient had severe sepsis with acute hypoxic respiratory failure. Chest x-ray showed bilateral airspace disease. Have developed aspiration pneumonia causing severe sepsis. It has shown improvement. Lactic acid level is down to 1.0. Her lactic acid level initially was 8.2  Patient is on IV Zosyn and IV vancomycin. Blood cultures are done, will check any growth. IV fluids volume resuscitation.   2. Bilateral pneumonia:  Is on IV Zosyn and vancomycin  We will continue to watch WBCs. 3.  Acute hypoxic respiratory failure:  Resolved now. On 1 L of oxygen.       Thank you for involving me in the management of the patient  Total of 30 minutes were spent in patient's evaluation and management     Milad Nassar MD  [facility]

## 2022-05-29 LAB
BACTERIA SPEC CULT: NORMAL
SPECIAL REQUESTS,SREQ: NORMAL

## 2022-11-27 ENCOUNTER — HOSPITAL ENCOUNTER (INPATIENT)
Age: 87
LOS: 3 days | Discharge: NURSING, REHAB OR LONG-TERM CARE | DRG: 177 | End: 2022-12-01
Attending: EMERGENCY MEDICINE | Admitting: HOSPITALIST
Payer: MEDICARE

## 2022-11-27 ENCOUNTER — HOSPITAL ENCOUNTER (EMERGENCY)
Age: 87
Discharge: ANOTHER HOSPITAL | End: 2022-11-27
Attending: EMERGENCY MEDICINE
Payer: MEDICARE

## 2022-11-27 ENCOUNTER — APPOINTMENT (OUTPATIENT)
Dept: CT IMAGING | Age: 87
End: 2022-11-27
Attending: EMERGENCY MEDICINE
Payer: MEDICARE

## 2022-11-27 ENCOUNTER — APPOINTMENT (OUTPATIENT)
Dept: GENERAL RADIOLOGY | Age: 87
End: 2022-11-27
Attending: EMERGENCY MEDICINE
Payer: MEDICARE

## 2022-11-27 VITALS
HEART RATE: 94 BPM | WEIGHT: 165 LBS | RESPIRATION RATE: 22 BRPM | TEMPERATURE: 98.1 F | BODY MASS INDEX: 25.9 KG/M2 | DIASTOLIC BLOOD PRESSURE: 82 MMHG | SYSTOLIC BLOOD PRESSURE: 114 MMHG | HEIGHT: 67 IN | OXYGEN SATURATION: 97 %

## 2022-11-27 DIAGNOSIS — I46.9 CARDIAC ARREST (HCC): ICD-10-CM

## 2022-11-27 DIAGNOSIS — J18.9 PNEUMONIA OF BOTH LOWER LOBES DUE TO INFECTIOUS ORGANISM: Primary | ICD-10-CM

## 2022-11-27 DIAGNOSIS — R55 SYNCOPE AND COLLAPSE: Primary | ICD-10-CM

## 2022-11-27 LAB
ALBUMIN SERPL-MCNC: 2.8 G/DL (ref 3.5–5)
ALBUMIN/GLOB SERPL: 0.7 {RATIO} (ref 1.1–2.2)
ALP SERPL-CCNC: 130 U/L (ref 45–117)
ALT SERPL-CCNC: 11 U/L (ref 12–78)
ANION GAP SERPL CALC-SCNC: 13 MMOL/L (ref 5–15)
ARTERIAL PATENCY WRIST A: YES
AST SERPL W P-5'-P-CCNC: 13 U/L (ref 15–37)
BASE DEFICIT BLDA-SCNC: 11.9 MMOL/L
BASOPHILS # BLD: 0 K/UL (ref 0–0.1)
BASOPHILS NFR BLD: 0 % (ref 0–1)
BDY SITE: ABNORMAL
BILIRUB SERPL-MCNC: 0.3 MG/DL (ref 0.2–1)
BUN SERPL-MCNC: 27 MG/DL (ref 6–20)
BUN/CREAT SERPL: 23 (ref 12–20)
CA-I BLD-MCNC: 7.1 MG/DL (ref 8.5–10.1)
CHLORIDE SERPL-SCNC: 113 MMOL/L (ref 97–108)
CK SERPL-CCNC: 56.76 U/L (ref 26–192)
CO2 SERPL-SCNC: 15 MMOL/L (ref 21–32)
COHGB MFR BLD: 0.3 % (ref 1–2)
CREAT SERPL-MCNC: 1.17 MG/DL (ref 0.55–1.02)
DIFFERENTIAL METHOD BLD: ABNORMAL
EOSINOPHIL # BLD: 0.6 K/UL (ref 0–0.4)
EOSINOPHIL NFR BLD: 7 % (ref 0–7)
ERYTHROCYTE [DISTWIDTH] IN BLOOD BY AUTOMATED COUNT: 17.2 % (ref 11.5–14.5)
FIO2 ON VENT: 21 %
GLOBULIN SER CALC-MCNC: 3.9 G/DL (ref 2–4)
GLUCOSE SERPL-MCNC: 153 MG/DL (ref 65–100)
HCO3 BLDA-SCNC: 14 MMOL/L (ref 22–26)
HCT VFR BLD AUTO: 41.4 % (ref 35–47)
HGB BLD-MCNC: 12.9 G/DL (ref 11.5–16)
IMM GRANULOCYTES # BLD AUTO: 0.1 K/UL (ref 0–0.04)
IMM GRANULOCYTES NFR BLD AUTO: 1 % (ref 0–0.5)
INR PPP: 1.2 (ref 0.9–1.1)
LACTATE SERPL-SCNC: 2.8 MMOL/L (ref 0.4–2)
LYMPHOCYTES # BLD: 1.5 K/UL (ref 0.8–3.5)
LYMPHOCYTES NFR BLD: 18 % (ref 12–49)
MAGNESIUM SERPL-MCNC: 1.7 MG/DL (ref 1.6–2.4)
MCH RBC QN AUTO: 29.1 PG (ref 26–34)
MCHC RBC AUTO-ENTMCNC: 31.2 G/DL (ref 30–36.5)
MCV RBC AUTO: 93.5 FL (ref 80–99)
METHGB MFR BLD: 0.3 % (ref 0–1.4)
MONOCYTES # BLD: 0.7 K/UL (ref 0–1)
MONOCYTES NFR BLD: 8 % (ref 5–13)
NEUTS SEG # BLD: 5.6 K/UL (ref 1.8–8)
NEUTS SEG NFR BLD: 66 % (ref 32–75)
NRBC # BLD: 0 K/UL (ref 0–0.01)
NRBC BLD-RTO: 0 PER 100 WBC
OXYHGB MFR BLD: 95.1 % (ref 95–99)
PCO2 BLDA: 33 MMHG (ref 35–45)
PERFORMED BY, TECHID: ABNORMAL
PH BLDA: 7.25 [PH] (ref 7.35–7.45)
PLATELET # BLD AUTO: 273 K/UL (ref 150–400)
PMV BLD AUTO: 9.7 FL (ref 8.9–12.9)
PO2 BLDA: 81 MMHG (ref 80–100)
POTASSIUM SERPL-SCNC: 4.2 MMOL/L (ref 3.5–5.1)
PROT SERPL-MCNC: 6.7 G/DL (ref 6.4–8.2)
PROTHROMBIN TIME: 14.4 SEC (ref 11.9–14.6)
RBC # BLD AUTO: 4.43 M/UL (ref 3.8–5.2)
RBC MORPH BLD: ABNORMAL
SAO2% DEVICE SAO2% SENSOR NAME: ABNORMAL
SODIUM SERPL-SCNC: 141 MMOL/L (ref 136–145)
SPECIMEN SITE: ABNORMAL
TROPONIN-HIGH SENSITIVITY: 9 NG/L (ref 0–51)
WBC # BLD AUTO: 8.5 K/UL (ref 3.6–11)

## 2022-11-27 PROCEDURE — 94640 AIRWAY INHALATION TREATMENT: CPT

## 2022-11-27 PROCEDURE — 96374 THER/PROPH/DIAG INJ IV PUSH: CPT

## 2022-11-27 PROCEDURE — 70450 CT HEAD/BRAIN W/O DYE: CPT

## 2022-11-27 PROCEDURE — 99285 EMERGENCY DEPT VISIT HI MDM: CPT

## 2022-11-27 PROCEDURE — 74011000250 HC RX REV CODE- 250: Performed by: EMERGENCY MEDICINE

## 2022-11-27 PROCEDURE — 71045 X-RAY EXAM CHEST 1 VIEW: CPT

## 2022-11-27 PROCEDURE — 85610 PROTHROMBIN TIME: CPT

## 2022-11-27 PROCEDURE — 93005 ELECTROCARDIOGRAM TRACING: CPT

## 2022-11-27 PROCEDURE — 36415 COLL VENOUS BLD VENIPUNCTURE: CPT

## 2022-11-27 PROCEDURE — 82550 ASSAY OF CK (CPK): CPT

## 2022-11-27 PROCEDURE — 85025 COMPLETE CBC W/AUTO DIFF WBC: CPT

## 2022-11-27 PROCEDURE — 74011000258 HC RX REV CODE- 258: Performed by: EMERGENCY MEDICINE

## 2022-11-27 PROCEDURE — 84484 ASSAY OF TROPONIN QUANT: CPT

## 2022-11-27 PROCEDURE — 74011250636 HC RX REV CODE- 250/636: Performed by: EMERGENCY MEDICINE

## 2022-11-27 PROCEDURE — 36600 WITHDRAWAL OF ARTERIAL BLOOD: CPT

## 2022-11-27 PROCEDURE — 80053 COMPREHEN METABOLIC PANEL: CPT

## 2022-11-27 PROCEDURE — 96375 TX/PRO/DX INJ NEW DRUG ADDON: CPT

## 2022-11-27 PROCEDURE — 71250 CT THORAX DX C-: CPT

## 2022-11-27 PROCEDURE — 82803 BLOOD GASES ANY COMBINATION: CPT

## 2022-11-27 PROCEDURE — 83605 ASSAY OF LACTIC ACID: CPT

## 2022-11-27 PROCEDURE — 5A12012 PERFORMANCE OF CARDIAC OUTPUT, SINGLE, MANUAL: ICD-10-PCS | Performed by: HOSPITALIST

## 2022-11-27 PROCEDURE — 83735 ASSAY OF MAGNESIUM: CPT

## 2022-11-27 RX ORDER — IPRATROPIUM BROMIDE AND ALBUTEROL SULFATE 2.5; .5 MG/3ML; MG/3ML
3 SOLUTION RESPIRATORY (INHALATION)
Status: COMPLETED | OUTPATIENT
Start: 2022-11-27 | End: 2022-11-27

## 2022-11-27 RX ORDER — ONDANSETRON 2 MG/ML
4 INJECTION INTRAMUSCULAR; INTRAVENOUS ONCE
Status: COMPLETED | OUTPATIENT
Start: 2022-11-27 | End: 2022-11-27

## 2022-11-27 RX ORDER — MORPHINE SULFATE 4 MG/ML
4 INJECTION INTRAVENOUS ONCE
Status: DISCONTINUED | OUTPATIENT
Start: 2022-11-27 | End: 2022-11-27 | Stop reason: HOSPADM

## 2022-11-27 RX ADMIN — AZITHROMYCIN 500 MG: 500 INJECTION, POWDER, LYOPHILIZED, FOR SOLUTION INTRAVENOUS at 21:14

## 2022-11-27 RX ADMIN — SODIUM CHLORIDE 1000 ML: 9 INJECTION, SOLUTION INTRAVENOUS at 14:52

## 2022-11-27 RX ADMIN — CEFTRIAXONE SODIUM 1 G: 1 INJECTION, POWDER, FOR SOLUTION INTRAMUSCULAR; INTRAVENOUS at 18:58

## 2022-11-27 RX ADMIN — FAMOTIDINE 20 MG: 10 INJECTION, SOLUTION INTRAVENOUS at 14:51

## 2022-11-27 RX ADMIN — IPRATROPIUM BROMIDE AND ALBUTEROL SULFATE 3 ML: .5; 2.5 SOLUTION RESPIRATORY (INHALATION) at 14:30

## 2022-11-27 RX ADMIN — IPRATROPIUM BROMIDE AND ALBUTEROL SULFATE 3 ML: .5; 2.5 SOLUTION RESPIRATORY (INHALATION) at 15:44

## 2022-11-27 RX ADMIN — ONDANSETRON 4 MG: 2 INJECTION INTRAMUSCULAR; INTRAVENOUS at 14:51

## 2022-11-27 RX ADMIN — IPRATROPIUM BROMIDE AND ALBUTEROL SULFATE 3 ML: .5; 2.5 SOLUTION RESPIRATORY (INHALATION) at 15:35

## 2022-11-27 RX ADMIN — METHYLPREDNISOLONE SODIUM SUCCINATE 125 MG: 125 INJECTION, POWDER, FOR SOLUTION INTRAMUSCULAR; INTRAVENOUS at 14:51

## 2022-11-27 NOTE — ED TRIAGE NOTES
Per [facility], heard Pt yell out; found in the floor; vomited; LOC. No pulse. CPT performed for 10mins and pulse returned. Transported by [employer]. Per [facility], Pt reported to have choked on food and LOC; CPR performed. On EMS arrival, alert. Aspiration/ respiratory distress. Incontinent of stool. Congested cough. Audible wheezing.

## 2022-11-27 NOTE — ED PROVIDER NOTES
EMERGENCY DEPARTMENT HISTORY AND PHYSICAL EXAM      Date: 11/27/2022  Patient Name: Drew Martinez    History of Presenting Illness     Chief Complaint   Patient presents with    Loss of Consciousness    Respiratory Distress       History Provided By: EMS and Nursing Home/SNF/Rehab Center    HPI: Drew Martinez, 81 y.o. female past medical history significant for CVA, hypertension, patient received from nursing home where she had a episode of syncope after choking on her food, nursing staff stated that they heard patient screaming when they got to the patient's room patient was found on the floor unconscious, vomitus present, there was no pulse chest compressions were initiated after 10 minutes patient's pulse was restored no medications were issued, EMS called, upon EMS arrival they were able to expel food bolus from patient's mouth, no CPR performed by EMS crew, patient herself oriented to self complains of a cough    There are no other complaints, changes, or physical findings at this time. Past History   Past Medical History:  Past Medical History:   Diagnosis Date    Cognitive communication deficit     Diabetes ([facility] [address]     Dysphagia     Gastrointestinal disorder     GERD (gastroesophageal reflux disease)     Hallucinations     Hypertension     Major depressive disorder, recurrent episode, mild ([facility]     Stroke [facility])        Past Surgical History:  History reviewed. No pertinent surgical history. Family History:  History reviewed. No pertinent family history. Social History:  Social History     Tobacco Use    Smoking status: Never    Smokeless tobacco: Never       Allergies:   Allergies   Allergen Reactions    Pcn [Penicillins] Unknown (comments)       PCP: Brigido Cruz MD    Current Facility-Administered Medications   Medication Dose Route Frequency Provider Last Rate Last Admin    sodium chloride 0.9 % bolus infusion 1,000 mL  1,000 mL IntraVENous ONCE Kaylee Michelle MD        ondansetron (ZOFRAN) injection 4 mg  4 mg IntraVENous ONCE Kaylee Michelle MD        famotidine (PF) (PEPCID) 20 mg in 0.9% sodium chloride 10 mL injection  20 mg IntraVENous NOW Kaylee Michelle MD        albuterol-ipratropium (DUO-NEB) 2.5 MG-0.5 MG/3 ML  3 mL Nebulization NOW Kaylee Michelle MD        methylPREDNISolone (PF) (Solu-MEDROL) injection 125 mg  125 mg IntraVENous NOW Kaylee Michelle MD         Current Outpatient Medications   Medication Sig Dispense Refill    carvediloL (COREG) 3.125 mg tablet Take 1 Tablet by mouth two (2) times daily (with meals). Indications: high blood pressure 60 Tablet 0    aspirin delayed-release 81 mg tablet Take 81 mg by mouth daily. latanoprost (XALATAN) 0.005 % ophthalmic solution Administer 1 Drop to both eyes nightly. losartan (COZAAR) 50 mg tablet Take 50 mg by mouth daily. omeprazole (PRILOSEC) 20 mg capsule Take 20 mg by mouth daily. potassium chloride SR (KLOR-CON 10) 10 mEq tablet Take 20 mEq by mouth daily. sertraline (ZOLOFT) 50 mg tablet Take 50 mg by mouth daily. Indications: major depressive disorder      acetaminophen (TYLENOL) 500 mg tablet Take 500 mg by mouth every four (4) hours as needed for Pain. loperamide (IMODIUM) 2 mg capsule Take 4 mg by mouth as needed for Diarrhea. Give 2 capsules by mouth every hour as needed for diarrhea after each loose stool up to 4 doses       Review of Systems   Review of Systems   Unable to perform ROS: Dementia   Constitutional:  Negative for chills and fever. HENT:  Negative for rhinorrhea and sore throat. Eyes:  Negative for pain and visual disturbance. Respiratory:  Positive for choking and wheezing. Negative for cough and shortness of breath. Cardiovascular:  Negative for chest pain and leg swelling. Gastrointestinal:  Positive for vomiting. Negative for abdominal pain. Endocrine: Negative for polydipsia and polyuria.    Genitourinary:  Negative for dysuria and hematuria. Musculoskeletal:  Negative for back pain and neck pain. Skin:  Negative for color change and pallor. Neurological:  Positive for syncope. Negative for weakness and headaches. Psychiatric/Behavioral:  Negative for agitation and suicidal ideas. Physical Exam   Physical Exam  Vitals and nursing note reviewed. Constitutional:       General: She is in acute distress. Appearance: She is not ill-appearing, toxic-appearing or diaphoretic. Comments: Patient presents with persistent cough   HENT:      Head: Normocephalic and atraumatic. Right Ear: Tympanic membrane normal.      Left Ear: Tympanic membrane normal.      Nose: Nose normal. No congestion. Mouth/Throat:      Mouth: Mucous membranes are moist.      Pharynx: Oropharynx is clear. Eyes:      Extraocular Movements: Extraocular movements intact. Conjunctiva/sclera: Conjunctivae normal.      Pupils: Pupils are equal, round, and reactive to light. Cardiovascular:      Rate and Rhythm: Normal rate and regular rhythm. Pulses: Normal pulses. Heart sounds: Normal heart sounds. Pulmonary:      Effort: Pulmonary effort is normal. Tachypnea present. No respiratory distress. Breath sounds: Examination of the right-lower field reveals decreased breath sounds. Examination of the left-lower field reveals decreased breath sounds. Decreased breath sounds and wheezing present. Abdominal:      General: Bowel sounds are normal.      Palpations: Abdomen is soft. Tenderness: There is no abdominal tenderness. Musculoskeletal:         General: No tenderness, deformity or signs of injury. Normal range of motion. Cervical back: Normal range of motion and neck supple. No rigidity or tenderness. Comments: Contraction of right upper extremity   Lymphadenopathy:      Cervical: No cervical adenopathy. Skin:     General: Skin is warm and dry.       Capillary Refill: Capillary refill takes less than 2 seconds. Findings: No rash. Neurological:      General: No focal deficit present. Mental Status: She is alert. Mental status is at baseline. Cranial Nerves: No cranial nerve deficit. Sensory: Sensation is intact. No sensory deficit. Motor: Motor function is intact. Psychiatric:         Mood and Affect: Mood normal.         Behavior: Behavior normal.       Lab and Diagnostic Study Results   Labs -   No results found for this or any previous visit (from the past 12 hour(s)). Radiologic Studies -   [unfilled]  CT Results  (Last 48 hours)      None          CXR Results  (Last 48 hours)      None            Medical Decision Making and ED Course   Differential Diagnosis & Medical Decision Making Provider Note:   Limmie Sill consciousness DDx seizure, CVA, ACS, dysrhythmia    - I am the first and primary provider for this patient. I reviewed the vital signs, available nursing notes, past medical history, past surgical history, family history and social history. The patient's presenting problems have been discussed, and the staff are in agreement with the care plan formulated and outlined with them. I have encouraged them to ask questions as they arise throughout their visit. Vital Signs-Reviewed the patient's vital signs. Patient Vitals for the past 12 hrs:   Temp Pulse Resp BP SpO2   11/27/22 1357 98.1 °F (36.7 °C) 94 22 94/64 97 %       EKG interpretation: (Preliminary): EKG Interpreted by me. Shows paced rhythm rate 83 with no acute ischemic changes    ED Course:   ED Course as of 11/27/22 2000   Sun Nov 27, 2022   1502 pH(!): 7.25 [SB]   1524 BICARBONATE(!): 14 [SB]   1524 CO2(!!): 15 [SB]   1524 Anion gap: 13 [SB]   3816 Unable to proceed with CTA of chest due to lack of venous access [SB]      ED Course User Index  [SB] Moose Tapia MD           Procedures and Critical Care     Performed by:  Juancarlos Wynn MD  Procedures    Kaylee Michelle MD    Disposition   Disposition: Condition stable and improved  Transferred to [facility] patient guardian agreed to transfer and understand the risks involved as outlined in the EMTALA form. DISCHARGE PLAN:  1. Current Discharge Medication List        CONTINUE these medications which have NOT CHANGED    Details   carvediloL (COREG) 3.125 mg tablet Take 1 Tablet by mouth two (2) times daily (with meals). Indications: high blood pressure  Qty: 60 Tablet, Refills: 0      aspirin delayed-release 81 mg tablet Take 81 mg by mouth daily. latanoprost (XALATAN) 0.005 % ophthalmic solution Administer 1 Drop to both eyes nightly. losartan (COZAAR) 50 mg tablet Take 50 mg by mouth daily. omeprazole (PRILOSEC) 20 mg capsule Take 20 mg by mouth daily. potassium chloride SR (KLOR-CON 10) 10 mEq tablet Take 20 mEq by mouth daily. sertraline (ZOLOFT) 50 mg tablet Take 50 mg by mouth daily. Indications: major depressive disorder      acetaminophen (TYLENOL) 500 mg tablet Take 500 mg by mouth every four (4) hours as needed for Pain. loperamide (IMODIUM) 2 mg capsule Take 4 mg by mouth as needed for Diarrhea. Give 2 capsules by mouth every hour as needed for diarrhea after each loose stool up to 4 doses           2. Follow-up Information    None       3. Return to ED if worse   4. Current Discharge Medication List        Remove if admitted/transferred    Diagnosis/Clinical Impression     Clinical Impression: No diagnosis found. Attestations: James ARREOLA MD, am the primary clinician of record. Please note that this dictation was completed with [employer], the computer voice recognition software. Quite often unanticipated grammatical, syntax, homophones, and other interpretive errors are inadvertently transcribed by the computer software. Please disregard these errors. Please excuse any errors that have escaped final proofreading. Thank you.

## 2022-11-27 NOTE — ED NOTES
Audible wheezing noted- rt at bedside.  Labs drawn- Phlebot made aware need assistnace drawing remainder of labs

## 2022-11-28 PROBLEM — T17.908A ASPIRATION INTO AIRWAY: Status: ACTIVE | Noted: 2022-11-28

## 2022-11-28 LAB
ALBUMIN SERPL-MCNC: 2.9 G/DL (ref 3.5–5)
ANION GAP SERPL CALC-SCNC: 10 MMOL/L (ref 5–15)
ATRIAL RATE: 83 BPM
ATRIAL RATE: 84 BPM
BUN SERPL-MCNC: 22 MG/DL (ref 6–20)
BUN/CREAT SERPL: 24 (ref 12–20)
CA-I BLD-MCNC: 7.2 MG/DL (ref 8.5–10.1)
CALCULATED P AXIS, ECG09: 65 DEGREES
CALCULATED P AXIS, ECG09: 85 DEGREES
CALCULATED R AXIS, ECG10: 14 DEGREES
CALCULATED R AXIS, ECG10: 7 DEGREES
CALCULATED T AXIS, ECG11: 43 DEGREES
CALCULATED T AXIS, ECG11: 53 DEGREES
CHLORIDE SERPL-SCNC: 118 MMOL/L (ref 97–108)
CO2 SERPL-SCNC: 13 MMOL/L (ref 21–32)
CREAT SERPL-MCNC: 0.91 MG/DL (ref 0.55–1.02)
DIAGNOSIS, 93000: NORMAL
DIAGNOSIS, 93000: NORMAL
GLUCOSE SERPL-MCNC: 176 MG/DL (ref 65–100)
P-R INTERVAL, ECG05: 150 MS
P-R INTERVAL, ECG05: 154 MS
PHOSPHATE SERPL-MCNC: 2.1 MG/DL (ref 2.6–4.7)
POTASSIUM SERPL-SCNC: 5.1 MMOL/L (ref 3.5–5.1)
Q-T INTERVAL, ECG07: 381 MS
Q-T INTERVAL, ECG07: 396 MS
QRS DURATION, ECG06: 118 MS
QRS DURATION, ECG06: 50 MS
QTC CALCULATION (BEZET), ECG08: 448 MS
QTC CALCULATION (BEZET), ECG08: 467 MS
SODIUM SERPL-SCNC: 141 MMOL/L (ref 136–145)
VENTRICULAR RATE, ECG03: 83 BPM
VENTRICULAR RATE, ECG03: 84 BPM

## 2022-11-28 PROCEDURE — 94761 N-INVAS EAR/PLS OXIMETRY MLT: CPT

## 2022-11-28 PROCEDURE — 93005 ELECTROCARDIOGRAM TRACING: CPT

## 2022-11-28 PROCEDURE — 36415 COLL VENOUS BLD VENIPUNCTURE: CPT

## 2022-11-28 PROCEDURE — 80069 RENAL FUNCTION PANEL: CPT

## 2022-11-28 PROCEDURE — 74011250637 HC RX REV CODE- 250/637: Performed by: HOSPITALIST

## 2022-11-28 PROCEDURE — 65270000029 HC RM PRIVATE

## 2022-11-28 PROCEDURE — 92610 EVALUATE SWALLOWING FUNCTION: CPT

## 2022-11-28 RX ORDER — SODIUM CHLORIDE 0.9 % (FLUSH) 0.9 %
5-40 SYRINGE (ML) INJECTION AS NEEDED
Status: DISCONTINUED | OUTPATIENT
Start: 2022-11-28 | End: 2022-12-01 | Stop reason: HOSPADM

## 2022-11-28 RX ORDER — ALBUTEROL SULFATE 90 UG/1
2 AEROSOL, METERED RESPIRATORY (INHALATION)
Status: DISCONTINUED | OUTPATIENT
Start: 2022-11-28 | End: 2022-12-01 | Stop reason: HOSPADM

## 2022-11-28 RX ORDER — PANTOPRAZOLE SODIUM 40 MG/1
40 TABLET, DELAYED RELEASE ORAL DAILY
Status: DISCONTINUED | OUTPATIENT
Start: 2022-11-28 | End: 2022-11-30

## 2022-11-28 RX ORDER — CARVEDILOL 3.12 MG/1
3.12 TABLET ORAL 2 TIMES DAILY WITH MEALS
Status: DISCONTINUED | OUTPATIENT
Start: 2022-11-28 | End: 2022-12-01 | Stop reason: HOSPADM

## 2022-11-28 RX ORDER — ACETAMINOPHEN 325 MG/1
650 TABLET ORAL
Status: DISCONTINUED | OUTPATIENT
Start: 2022-11-28 | End: 2022-12-01 | Stop reason: HOSPADM

## 2022-11-28 RX ORDER — ASPIRIN 81 MG/1
81 TABLET ORAL DAILY
Status: DISCONTINUED | OUTPATIENT
Start: 2022-11-28 | End: 2022-11-30

## 2022-11-28 RX ORDER — ACETAMINOPHEN 650 MG/1
650 SUPPOSITORY RECTAL
Status: DISCONTINUED | OUTPATIENT
Start: 2022-11-28 | End: 2022-12-01 | Stop reason: HOSPADM

## 2022-11-28 RX ORDER — LOSARTAN POTASSIUM 50 MG/1
50 TABLET ORAL DAILY
Status: DISCONTINUED | OUTPATIENT
Start: 2022-11-28 | End: 2022-11-30

## 2022-11-28 RX ORDER — ONDANSETRON 4 MG/1
4 TABLET, ORALLY DISINTEGRATING ORAL
Status: DISCONTINUED | OUTPATIENT
Start: 2022-11-28 | End: 2022-12-01 | Stop reason: HOSPADM

## 2022-11-28 RX ORDER — LATANOPROST 50 UG/ML
1 SOLUTION/ DROPS OPHTHALMIC
Status: DISCONTINUED | OUTPATIENT
Start: 2022-11-28 | End: 2022-12-01 | Stop reason: HOSPADM

## 2022-11-28 RX ORDER — ONDANSETRON 2 MG/ML
4 INJECTION INTRAMUSCULAR; INTRAVENOUS
Status: DISCONTINUED | OUTPATIENT
Start: 2022-11-28 | End: 2022-12-01 | Stop reason: HOSPADM

## 2022-11-28 RX ORDER — IPRATROPIUM BROMIDE AND ALBUTEROL SULFATE 2.5; .5 MG/3ML; MG/3ML
3 SOLUTION RESPIRATORY (INHALATION)
Status: DISCONTINUED | OUTPATIENT
Start: 2022-11-28 | End: 2022-12-01 | Stop reason: HOSPADM

## 2022-11-28 RX ORDER — SERTRALINE HYDROCHLORIDE 25 MG/1
25 TABLET, FILM COATED ORAL DAILY
Status: DISCONTINUED | OUTPATIENT
Start: 2022-11-28 | End: 2022-12-01 | Stop reason: HOSPADM

## 2022-11-28 RX ORDER — SODIUM CHLORIDE 0.9 % (FLUSH) 0.9 %
5-40 SYRINGE (ML) INJECTION EVERY 8 HOURS
Status: DISCONTINUED | OUTPATIENT
Start: 2022-11-28 | End: 2022-12-01 | Stop reason: HOSPADM

## 2022-11-28 RX ORDER — IPRATROPIUM BROMIDE AND ALBUTEROL SULFATE 2.5; .5 MG/3ML; MG/3ML
3 SOLUTION RESPIRATORY (INHALATION)
Status: DISCONTINUED | OUTPATIENT
Start: 2022-11-28 | End: 2022-11-28

## 2022-11-28 RX ADMIN — LOSARTAN POTASSIUM 50 MG: 50 TABLET, FILM COATED ORAL at 11:30

## 2022-11-28 RX ADMIN — CARVEDILOL 3.12 MG: 3.12 TABLET, FILM COATED ORAL at 11:30

## 2022-11-28 RX ADMIN — PANTOPRAZOLE SODIUM 40 MG: 40 TABLET, DELAYED RELEASE ORAL at 11:30

## 2022-11-28 RX ADMIN — ASPIRIN 81 MG: 81 TABLET, COATED ORAL at 11:30

## 2022-11-28 RX ADMIN — SERTRALINE 25 MG: 25 TABLET, FILM COATED ORAL at 11:30

## 2022-11-28 NOTE — ED PROVIDER NOTES
EMERGENCY DEPARTMENT HISTORY AND PHYSICAL EXAM      Date: 11/27/2022  Patient Name: Preete Officer      History of Presenting Illness     Chief Complaint   Patient presents with    Transfer Of Care       History Provided By: EMS    HPI: Elysia Officer, 81 y.o. female with a past medical history significant for CVA, HTN, Cognitive communication deficit, dysphagia, GERD, Hallucinations, MDD,  presents to the ED with cc of transfer of care, possible cardiac arrest. Pt screaming at nursing home, heard choking on her food, arrived to room to find vomitus present, unconscious and pulseless. ROSC obtained w/CPR, food bolus expelled on EMS arrival without CPR performed by EMS. Awake & talking c/o cough at OSH. Unable to obtain history here d/t pt severe dementia, speaking in nonsequiters. There are no other complaints, changes, or physical findings at this time. PCP: Ronn Mares MD    Current Outpatient Medications   Medication Sig Dispense Refill    carvediloL (COREG) 3.125 mg tablet Take 1 Tablet by mouth two (2) times daily (with meals). Indications: high blood pressure 60 Tablet 0    aspirin delayed-release 81 mg tablet Take 81 mg by mouth daily. latanoprost (XALATAN) 0.005 % ophthalmic solution Administer 1 Drop to both eyes nightly. losartan (COZAAR) 50 mg tablet Take 50 mg by mouth daily. omeprazole (PRILOSEC) 20 mg capsule Take 20 mg by mouth daily. potassium chloride SR (KLOR-CON 10) 10 mEq tablet Take 20 mEq by mouth daily. sertraline (ZOLOFT) 50 mg tablet Take 50 mg by mouth daily. Indications: major depressive disorder      acetaminophen (TYLENOL) 500 mg tablet Take 500 mg by mouth every four (4) hours as needed for Pain. loperamide (IMODIUM) 2 mg capsule Take 4 mg by mouth as needed for Diarrhea.  Give 2 capsules by mouth every hour as needed for diarrhea after each loose stool up to 4 doses         Past History     Past Medical History:  Past Medical History: Diagnosis Date    Cognitive communication deficit     Diabetes ([facility] [address]     Dysphagia     Gastrointestinal disorder     GERD (gastroesophageal reflux disease)     Hallucinations     Hypertension     Major depressive disorder, recurrent episode, mild ([facility] [address]     Stroke [facility])        Past Surgical History:  History reviewed. No pertinent surgical history. Family History:  History reviewed. No pertinent family history. Social History:  Social History     Tobacco Use    Smoking status: Never    Smokeless tobacco: Never   Substance Use Topics    Drug use: Never       Allergies: Allergies   Allergen Reactions    Pcn [Penicillins] Unknown (comments)         Review of Systems   WILLIE 2/2 pt dementia    Physical Exam   Constitutional: Awake and alert, interactive, NAD, severely demented, speaking in nonsequiters  Eyes: PERRL, no injection or scleral icterus, no discharge  HEENT: NCAT, neck supple, MMM, no oropharyngeal exudates  CV: RRR, no m/r/g  Respiratory: CTAB, no r/r/w  GI: Abd soft, nondistended, nontender  : Deferred  MSK: FROM, no joint effusions or edema  Skin: No rashes  Neuro: MAEE. Symmetric facies. Psych: Bizarre speech and content, nonsensical responses but fluent speech, unable to ascertain orientation    Lab and Diagnostic Study Results     Labs -     Recent Results (from the past 12 hour(s))   CBC WITH AUTOMATED DIFF    Collection Time: 11/27/22  2:35 PM   Result Value Ref Range    WBC 8.5 3.6 - 11.0 K/uL    RBC 4.43 3.80 - 5.20 M/uL    HGB 12.9 11.5 - 16.0 g/dL    HCT 41.4 35.0 - 47.0 %    MCV 93.5 80.0 - 99.0 FL    MCH 29.1 26.0 - 34.0 PG    MCHC 31.2 30.0 - 36.5 g/dL    RDW 17.2 (H) 11.5 - 14.5 %    PLATELET 088 923 - 159 K/uL    MPV 9.7 8.9 - 12.9 FL    NRBC 0.0 0.0  WBC    ABSOLUTE NRBC 0.00 0.00 - 0.01 K/uL    NEUTROPHILS 66 32 - 75 %    LYMPHOCYTES 18 12 - 49 %    MONOCYTES 8 5 - 13 %    EOSINOPHILS 7 0 - 7 %    BASOPHILS 0 0 - 1 %    IMMATURE GRANULOCYTES 1 (H) 0 - 0.5 %    ABS. NEUTROPHILS 5.6 1.8 - 8.0 K/UL    ABS. LYMPHOCYTES 1.5 0.8 - 3.5 K/UL    ABS. MONOCYTES 0.7 0.0 - 1.0 K/UL    ABS. EOSINOPHILS 0.6 (H) 0.0 - 0.4 K/UL    ABS. BASOPHILS 0.0 0.0 - 0.1 K/UL    ABS. IMM. GRANS. 0.1 (H) 0.00 - 0.04 K/UL    DF AUTOMATED      RBC COMMENTS Normocytic, Normochromic     PROTHROMBIN TIME + INR    Collection Time: 11/27/22  2:35 PM   Result Value Ref Range    Prothrombin time 14.4 11.9 - 14.6 sec    INR 1.2 (H) 0.9 - 1.1     METABOLIC PANEL, COMPREHENSIVE    Collection Time: 11/27/22  2:35 PM   Result Value Ref Range    Sodium 141 136 - 145 mmol/L    Potassium 4.2 3.5 - 5.1 mmol/L    Chloride 113 (H) 97 - 108 mmol/L    CO2 15 (LL) 21 - 32 mmol/L    Anion gap 13 5 - 15 mmol/L    Glucose 153 (H) 65 - 100 mg/dL    BUN 27 (H) 6 - 20 mg/dL    Creatinine 1.17 (H) 0.55 - 1.02 mg/dL    BUN/Creatinine ratio 23 (H) 12 - 20      eGFR 45 (L) >60 ml/min/1.73m2    Calcium 7.1 (L) 8.5 - 10.1 mg/dL    Bilirubin, total 0.3 0.2 - 1.0 mg/dL    AST (SGOT) 13 (L) 15 - 37 U/L    ALT (SGPT) 11 (L) 12 - 78 U/L    Alk.  phosphatase 130 (H) 45 - 117 U/L    Protein, total 6.7 6.4 - 8.2 g/dL    Albumin 2.8 (L) 3.5 - 5.0 g/dL    Globulin 3.9 2.0 - 4.0 g/dL    A-G Ratio 0.7 (L) 1.1 - 2.2     TROPONIN-HIGH SENSITIVITY    Collection Time: 11/27/22  2:35 PM   Result Value Ref Range    Troponin-High Sensitivity 9 0 - 51 ng/L   LACTIC ACID    Collection Time: 11/27/22  2:35 PM   Result Value Ref Range    Lactic acid 2.8 (HH) 0.4 - 2.0 mmol/L   MAGNESIUM    Collection Time: 11/27/22  2:35 PM   Result Value Ref Range    Magnesium 1.7 1.6 - 2.4 mg/dL   CK    Collection Time: 11/27/22  2:35 PM   Result Value Ref Range    CK 56.76 26 - 192 U/L   BLOOD GAS, ARTERIAL    Collection Time: 11/27/22  2:46 PM   Result Value Ref Range    pH 7.25 (L) 7.35 - 7.45      PCO2 33 (L) 35 - 45 mmHg    PO2 81 80 - 100 mmHg    BICARBONATE 14 (L) 22 - 26 mmol/L    BASE DEFICIT 11.9 mmol/L    O2 METHOD Room air      FIO2 21 %    Sample source Arterial SITE Left Radial      JOHN'S TEST YES      Carboxy-Hgb 0.3 (L) 1 - 2 %    Methemoglobin 0.3 0 - 1.4 %    Oxyhemoglobin 95.1 95 - 99 %    Performed by Yunior Estrada    EKG, 12 LEAD, INITIAL    Collection Time: 11/27/22  3:17 PM   Result Value Ref Range    Ventricular Rate 83 BPM    Atrial Rate 83 BPM    P-R Interval 150 ms    QRS Duration 118 ms    Q-T Interval 381 ms    QTC Calculation (Bezet) 448 ms    Calculated P Axis 65 degrees    Calculated R Axis 14 degrees    Calculated T Axis 43 degrees    Diagnosis       Sinus rhythm  Nonspecific intraventricular conduction delay  Low voltage, precordial leads  Minimal ST depression, lateral leads         Radiologic Studies -   [unfilled]  CT Results  (Last 48 hours)                 11/27/22 1710  CT CHEST WO CONT Final result    Impression:  1. Patchy bilateral groundglass opacities likely infectious or inflammatory. Narrative:  EXAM:  CT CHEST WO CONT   INDICATION:  persistent cough   COMPARISON: None. .   TECHNIQUE: Unenhanced multislice helical CT was performed from the thoracic   inlet to the adrenal glands without intravenous contrast administration. Contiguous 5 mm axial images were reconstructed and lung and soft tissue windows   were generated. Coronal and sagittal reformations were generated. CT dose   reduction was achieved through use of a standardized protocol tailored for this   examination and automatic exposure control for dose modulation. [place] FINDINGS:   CHEST:   Chest wall/thoracic inlet: Within normal limits. Thyroid: Within normal limits. Mediastinum/landon: There are no pathologically enlarged mediastinal, hilar or   axillary nodes. Heart/vessels: Heart is normal in size. Mild coronary artery calcifications. Aortic arch is normal in caliber. No significant atherosclerotic vascular   change. Lungs/Pleura: Patchy subtle groundglass opacities bilaterally. No pneumothorax   or effusion.    .   ABDOMEN:   Incidentally imaged portions of the abdomen appear unremarkable. .   MSK: Within normal limits. Josefina Rodriguez 11/27/22 1430  CT HEAD WO CONT Final result    Impression:      1. No acute abnormality   2. Hypertrophied soft tissue posterior to the dens resulting in severe narrowing   of the canal C1. This has not significantly changed               Narrative:  EXAM: CT HEAD WO CONT       INDICATION: syncope       COMPARISON: 9/30/2013. CONTRAST: None. TECHNIQUE: Unenhanced CT of the head was performed using 5 mm images. Brain and   bone windows were generated. Coronal and sagittal reformats. CT dose reduction   was achieved through use of a standardized protocol tailored for this   examination and automatic exposure control for dose modulation. FINDINGS:   The ventricles and sulci are normal in size, shape and configuration. . Minimal   low density in the periventricular white matter. There is no intracranial   hemorrhage, extra-axial collection, or mass effect. The basilar cisterns are   open. No CT evidence of acute infarct. The bone windows demonstrate radiodense soft tissue posterior to the dens   resulting in severe narrowing of the canal at the level of C1. This has not   significantly changed. The visualized portions of the paranasal sinuses and   mastoid air cells are clear. CXR Results  (Last 48 hours)                 11/27/22 1429  XR CHEST PORT Final result    Impression:  1. No acute disease           Narrative:  INDICATION:  s/p food  aspiration        Exam: Portable chest 1428. Comparison: 5/26/2022. Findings: Cardiomediastinal silhouette is within normal limits. Pulmonary   vasculature is not engorged. There are no focal parenchymal opacities,   effusions, or pneumothorax. Medical Decision Making and ED Course   - I am the first and primary provider for this patient AND AM THE PRIMARY PROVIDER OF RECORD.     - I reviewed the vital signs, available nursing notes, past medical history, past surgical history, family history and social history. - Initial assessment performed. The patients presenting problems have been discussed, and the staff are in agreement with the care plan formulated and outlined with them. I have encouraged them to ask questions as they arise throughout their visit. Vital Signs-Reviewed the patient's vital signs. Patient Vitals for the past 12 hrs:   Temp Pulse Resp BP SpO2   11/27/22 2302 98.5 °F (36.9 °C) 85 16 (!) 142/76 97 %       EKG interpretation: NSR, no Brugada's, hypertrophy w/dagger lateral Q-waves, delta or epsilon waves. Provider Notes (Medical Decision Making):   23H w/syncope vs. Cardiac arrest, suspect more likely vasovagal syncope from asphyxia/food bolus. CT Head negative, CT Chest possible bronchitis, atypical PNA. Will swab for COVID, get EKG and admit for continued observation. Given reported history unlikely sepsis and no e/o consolidative PNA on CT chest, will defer abx at this time. ED Course:       ED Course as of 11/27/22 2333   [facility] Nov 27, 2022   2324 Admitted to Dr. Cele Mao. [YA]      ED Course User Index  [YA] Monica Lara MD         Consultations:     Hospitalist Dr. Cele Mao. Procedures and Critical Care     CRITICAL CARE NOTE :  11:33 PM  Amount of Critical Care Time: 15(minutes)    IMPENDING DETERIORATION -Cardiovascular and CNS  ASSOCIATED RISK FACTORS - Dysrhythmia  MANAGEMENT- Bedside Assessment  INTERPRETATION -  CT Scan and ECG  INTERVENTIONS - n/a  CASE REVIEW - Hospitalist/Intensivist  TREATMENT RESPONSE -Stable  PERFORMED BY - Self    NOTES   :  I have spent critical care time involved in lab review, consultations with specialist, family decision- making, bedside attention and documentation. This time excludes time spent in any separate billed procedures. During this entire length of time I was immediately available to the patient .     Bello Paula, MD      Disposition     Disposition: Admitted to Floor Medical Floor the case was discussed with the admitting physician Dr. Cele Mao. Admitted      Diagnosis     Clinical Impression:   1. Syncope and collapse    2. Cardiac arrest [facility])        Attestations:     Bello Paula MD

## 2022-11-28 NOTE — ED TRIAGE NOTES
Patient transferred from [facility], she was found unresponsive in her room with emesis around her patient was pulseless per staff at the facility and CPR was initiated for about 10 minutes. EMS arrived and food was dislodged from her throat, patient pulse was present and patient taken to ED. Patient was alert on arrival to [facility] and diagnosed with pneumonia. On arrival to [facility] patient is awake and talking.

## 2022-11-28 NOTE — ED NOTES
Pt A/O x4. Without notable acute distress.  Pt left ED via [facility] by [facility] ambulance services to [facility].

## 2022-11-28 NOTE — PROGRESS NOTES
Reason for Admission:  Cardiac Arrest                     RUR Score:  14%                   Plan for utilizing home health: None. Brother Stacey De ) verbally consented to Choice Letter to return to [facility]. Referral sent via [facility]. Total care/w/c. PCP: First and Last name:  Dior Rivas MD     Name of Practice:    Are you a current patient: Yes/No: Yes   Approximate date of last visit: Seen @ the facility. Can you participate in a virtual visit with your PCP: No                    Current Advanced Directive/Advance Care Plan: Full Code      Healthcare Decision Maker:     Primary Decision Maker: Reymundo Marsh   - [phone]                  Transition of Care Plan:                    D/C Plan is to return to [facility] upon discharge via transportation.

## 2022-11-28 NOTE — H&P
Hospitalist History & Physical Notes. [facility]. Name : Mundo Lockett      MRN number : [id number]     YOB: 1935     Subjective :   Chief Complaint : Cardiac arrest due to aspiration of food into airway    Source of information : From ED providers note from referring facility, ED MD.    History of present illness:   81 y.o. female with advanced dementia, hypertension, for long time bedridden status resident of long-term care facility found on the floor unconscious. Staff hold some sounds from her laying down, when they went to the room she was on the floor and not breathing found with no pulse. Started CPR and called EMS. CPR done for 10 minutes with pulse back, when EMT arrived they were able to expel the food bolus that was obstructing from the mouth. Patient was back to herself, has significant cough. EMT did not continue any CPR and brought to the emergency room. She is alert but with wheezing and mild respiratory distress. Was given breathing treatments twice with much improvement still with some wheezing and rhonchi present. Transferred here for further care. Patient is in the hallway bed pleasant not in any distress, she is just talking but not related to our questions and she cannot carry on a conversation. Past Medical History:   Diagnosis Date    Cognitive communication deficit     Diabetes ([address]     Dysphagia     Gastrointestinal disorder     GERD (gastroesophageal reflux disease)     Hallucinations     Hypertension     Major depressive disorder, recurrent episode, mild ([address]     Stroke ([address]      History reviewed. No pertinent surgical history. Family History   Family history unknown: Yes      Social History     Tobacco Use    Smoking status: Never    Smokeless tobacco: Never   Substance Use Topics    Alcohol use: Never       Prior to Admission medications    Medication Sig Start Date End Date Taking?  Authorizing Provider   carvediloL (COREG) 3.125 mg tablet Take 1 Tablet by mouth two (2) times daily (with meals). Indications: high blood pressure 5/27/22   Flores Aranda MD   aspirin delayed-release 81 mg tablet Take 81 mg by mouth daily. Provider, Historical   latanoprost (XALATAN) 0.005 % ophthalmic solution Administer 1 Drop to both eyes nightly. Provider, Historical   losartan (COZAAR) 50 mg tablet Take 50 mg by mouth daily. Provider, Historical   omeprazole (PRILOSEC) 20 mg capsule Take 20 mg by mouth daily. Provider, Historical   potassium chloride SR (KLOR-CON 10) 10 mEq tablet Take 20 mEq by mouth daily. Provider, Historical   sertraline (ZOLOFT) 50 mg tablet Take 50 mg by mouth daily. Indications: major depressive disorder    Provider, Historical   acetaminophen (TYLENOL) 500 mg tablet Take 500 mg by mouth every four (4) hours as needed for Pain. Provider, Historical   loperamide (IMODIUM) 2 mg capsule Take 4 mg by mouth as needed for Diarrhea. Give 2 capsules by mouth every hour as needed for diarrhea after each loose stool up to 4 doses    Provider, Historical     Allergies   Allergen Reactions    Pcn [Penicillins] Unknown (comments)             Review of Systems: Unable to obtain any information from patient due to advanced dementia    Vitals:   Patient Vitals for the past 12 hrs:   Temp Pulse Resp BP SpO2   11/27/22 2302 98.5 °F (36.9 °C) 85 16 (!) 142/76 97 %       Physical Exam:   General : Pleasantly confused and appears comfortable. Mild wheezing noted but not in distress. HEENT : PERRLA, normal oral mucosa, atraumatic normocephalic, Normal ear and nose. Neck : Supple, no JVD, no masses noted, no carotid bruit. Lungs : Breath sounds with moderate air entry bilaterally, air movement is moderate, expiratory wheezes but clearing up with cough. Sometimes she has congested cough, not using accessory muscles. CVS : Rhythm rate regular, S1+, S2+, no murmur or gallop.   Abdomen : Soft, nontender, , bowel sounds present. Extremities : No edema noted,  pedal pulses palpable. Musculoskeletal : Right hand fingers with flexion contractures cannot open. No joint swelling or effusion, muscle tone and power appears decreased but fair. Skin : Moist, warm,  no pathological rash. Lymphatic : No cervical lymphadenopathy. Neurological : Awake, alert, she is oriented to self. Psychiatric : Pleasant. Data Review:   Recent Results (from the past 24 hour(s))   CBC WITH AUTOMATED DIFF    Collection Time: 11/27/22  2:35 PM   Result Value Ref Range    WBC 8.5 3.6 - 11.0 K/uL    RBC 4.43 3.80 - 5.20 M/uL    HGB 12.9 11.5 - 16.0 g/dL    HCT 41.4 35.0 - 47.0 %    MCV 93.5 80.0 - 99.0 FL    MCH 29.1 26.0 - 34.0 PG    MCHC 31.2 30.0 - 36.5 g/dL    RDW 17.2 (H) 11.5 - 14.5 %    PLATELET 556 843 - 846 K/uL    MPV 9.7 8.9 - 12.9 FL    NRBC 0.0 0.0  WBC    ABSOLUTE NRBC 0.00 0.00 - 0.01 K/uL    NEUTROPHILS 66 32 - 75 %    LYMPHOCYTES 18 12 - 49 %    MONOCYTES 8 5 - 13 %    EOSINOPHILS 7 0 - 7 %    BASOPHILS 0 0 - 1 %    IMMATURE GRANULOCYTES 1 (H) 0 - 0.5 %    ABS. NEUTROPHILS 5.6 1.8 - 8.0 K/UL    ABS. LYMPHOCYTES 1.5 0.8 - 3.5 K/UL    ABS. MONOCYTES 0.7 0.0 - 1.0 K/UL    ABS. EOSINOPHILS 0.6 (H) 0.0 - 0.4 K/UL    ABS. BASOPHILS 0.0 0.0 - 0.1 K/UL    ABS. IMM.  GRANS. 0.1 (H) 0.00 - 0.04 K/UL    DF AUTOMATED      RBC COMMENTS Normocytic, Normochromic     PROTHROMBIN TIME + INR    Collection Time: 11/27/22  2:35 PM   Result Value Ref Range    Prothrombin time 14.4 11.9 - 14.6 sec    INR 1.2 (H) 0.9 - 1.1     METABOLIC PANEL, COMPREHENSIVE    Collection Time: 11/27/22  2:35 PM   Result Value Ref Range    Sodium 141 136 - 145 mmol/L    Potassium 4.2 3.5 - 5.1 mmol/L    Chloride 113 (H) 97 - 108 mmol/L    CO2 15 (LL) 21 - 32 mmol/L    Anion gap 13 5 - 15 mmol/L    Glucose 153 (H) 65 - 100 mg/dL    BUN 27 (H) 6 - 20 mg/dL    Creatinine 1.17 (H) 0.55 - 1.02 mg/dL    BUN/Creatinine ratio 23 (H) 12 - 20      eGFR 45 (L) >60 ml/min/1.73m2 Calcium 7.1 (L) 8.5 - 10.1 mg/dL    Bilirubin, total 0.3 0.2 - 1.0 mg/dL    AST (SGOT) 13 (L) 15 - 37 U/L    ALT (SGPT) 11 (L) 12 - 78 U/L    Alk. phosphatase 130 (H) 45 - 117 U/L    Protein, total 6.7 6.4 - 8.2 g/dL    Albumin 2.8 (L) 3.5 - 5.0 g/dL    Globulin 3.9 2.0 - 4.0 g/dL    A-G Ratio 0.7 (L) 1.1 - 2.2     TROPONIN-HIGH SENSITIVITY    Collection Time: 11/27/22  2:35 PM   Result Value Ref Range    Troponin-High Sensitivity 9 0 - 51 ng/L   LACTIC ACID    Collection Time: 11/27/22  2:35 PM   Result Value Ref Range    Lactic acid 2.8 (HH) 0.4 - 2.0 mmol/L   MAGNESIUM    Collection Time: 11/27/22  2:35 PM   Result Value Ref Range    Magnesium 1.7 1.6 - 2.4 mg/dL   CK    Collection Time: 11/27/22  2:35 PM   Result Value Ref Range    CK 56.76 26 - 192 U/L   BLOOD GAS, ARTERIAL    Collection Time: 11/27/22  2:46 PM   Result Value Ref Range    pH 7.25 (L) 7.35 - 7.45      PCO2 33 (L) 35 - 45 mmHg    PO2 81 80 - 100 mmHg    BICARBONATE 14 (L) 22 - 26 mmol/L    BASE DEFICIT 11.9 mmol/L    O2 METHOD Room air      FIO2 21 %    Sample source Arterial      SITE Left Radial      JOHN'S TEST YES      Carboxy-Hgb 0.3 (L) 1 - 2 %    Methemoglobin 0.3 0 - 1.4 %    Oxyhemoglobin 95.1 95 - 99 %    Performed by Geovany Scott    EKG, 12 LEAD, INITIAL    Collection Time: 11/27/22  3:17 PM   Result Value Ref Range    Ventricular Rate 83 BPM    Atrial Rate 83 BPM    P-R Interval 150 ms    QRS Duration 118 ms    Q-T Interval 381 ms    QTC Calculation (Bezet) 448 ms    Calculated P Axis 65 degrees    Calculated R Axis 14 degrees    Calculated T Axis 43 degrees    Diagnosis       Sinus rhythm  Nonspecific intraventricular conduction delay  Low voltage, precordial leads  Minimal ST depression, lateral leads         Radiologic Studies :   CT Results  (Last 48 hours)                 11/27/22 1710  CT CHEST WO CONT Final result    Impression:  1. Patchy bilateral groundglass opacities likely infectious or inflammatory.         Narrative: EXAM:  CT CHEST WO CONT   INDICATION:  persistent cough   COMPARISON: None. .   TECHNIQUE: Unenhanced multislice helical CT was performed from the thoracic   inlet to the adrenal glands without intravenous contrast administration. Contiguous 5 mm axial images were reconstructed and lung and soft tissue windows   were generated. Coronal and sagittal reformations were generated. CT dose   reduction was achieved through use of a standardized protocol tailored for this   examination and automatic exposure control for dose modulation. Jayshree Samuels FINDINGS:   CHEST:   Chest wall/thoracic inlet: Within normal limits. Thyroid: Within normal limits. Mediastinum/landon: There are no pathologically enlarged mediastinal, hilar or   axillary nodes. Heart/vessels: Heart is normal in size. Mild coronary artery calcifications. Aortic arch is normal in caliber. No significant atherosclerotic vascular   change. Lungs/Pleura: Patchy subtle groundglass opacities bilaterally. No pneumothorax   or effusion. .   ABDOMEN:   Incidentally imaged portions of the abdomen appear unremarkable. .   MSK: Within normal limits. Jayshree Samuels 11/27/22 1430  CT HEAD WO CONT Final result    Impression:      1. No acute abnormality   2. Hypertrophied soft tissue posterior to the dens resulting in severe narrowing   of the canal C1. This has not significantly changed               Narrative:  EXAM: CT HEAD WO CONT       INDICATION: syncope       COMPARISON: 9/30/2013. CONTRAST: None. TECHNIQUE: Unenhanced CT of the head was performed using 5 mm images. Brain and   bone windows were generated. Coronal and sagittal reformats. CT dose reduction   was achieved through use of a standardized protocol tailored for this   examination and automatic exposure control for dose modulation. FINDINGS:   The ventricles and sulci are normal in size, shape and configuration. . Minimal   low density in the periventricular white matter.  There is no intracranial   hemorrhage, extra-axial collection, or mass effect. The basilar cisterns are   open. No CT evidence of acute infarct. The bone windows demonstrate radiodense soft tissue posterior to the dens   resulting in severe narrowing of the canal at the level of C1. This has not   significantly changed. The visualized portions of the paranasal sinuses and   mastoid air cells are clear. CXR Results  (Last 48 hours)                 11/27/22 1429  XR CHEST PORT Final result    Impression:  1. No acute disease           Narrative:  INDICATION:  s/p food  aspiration        Exam: Portable chest 1428. Comparison: 5/26/2022. Findings: Cardiomediastinal silhouette is within normal limits. Pulmonary   vasculature is not engorged. There are no focal parenchymal opacities,   effusions, or pneumothorax. Assessment and Plan :     Acute cardiorespiratory arrest secondary to choking on food bolus with difficulty breathing, received CPR and recovered back to baseline. Aspiration food bolus with airway reactive disease: She is wheezing but it is much better than on arrival to the referring facility emergency room. We will continue Proventil inhaler as needed and DuoNeb 4 times a day for the next 2 days. With suspected aspiration pneumonitis started on Levaquin which we will discontinue if she continues to do well next 2 days. Benign essential hypertension: Continue home medications, on Cozaar and carvedilol which we will continue    Previous hypokalemia on regular supplementation, which I am going to hold and see how she does    Senile dementia with no behavioral disturbance, very pleasant. History of depression on Zoloft, she is taking this for long time. I will try to decrease the dose and see how she tolerates while she is in the hospital.  Decreased it to 25 mg daily. Admitted to medical telemetry, unable to discuss with patient about CODE STATUS.   We will leave her on full CODE STATUS, no advanced medical directives. Patient cannot make decisions with understanding. We need to check with long-term facility that she is at to check on the [address]. Home medications reviewed with the chart, I do not have the list of medications from the facility. CC : Aurelio Yang MD  Signed By: Marco Correia MD     November 28, 2022      This dictation was done by dragon, computer voice recognition software. Often unanticipated grammatical, syntax, [place] phones and other interpretive errors are inadvertently transcribed. Please excuse errors that have escaped final proofreading.

## 2022-11-28 NOTE — PROGRESS NOTES
Problem: Dysphagia (Adult)  Goal: *Acute Goals and Plan of Care (Insert Text)  Description: Speech Therapy Goals  Initiated 11/28/2022  -Patient stated goal: unable to state due to AMS  -Patient will participate in repeat modified barium swallow study within 7 day(s), if indicated pending pt's progress. [ ] Not met  [ ]  MET   [ ] Progressing  [ ] [place]  -Patient will tolerate puree diet with thin liquids without signs/symptoms of aspiration given no cues within 7 day(s). [ ] Not met  [ ]  MET   [ ] Progressing  [ ] [place]  -Patient will demonstrate understanding of swallow safety precautions and aspiration precautions, diet recs with mod cues within 7 day(s). [ ] Not met  [ ]  MET   [ ] Progressing  [ ] Discontinue  Outcome: Not Met   SPEECH [address] EVALUATION  Patient: Sarah Chery (71 y.o. female)  Date: 11/28/2022  Primary Diagnosis: Cardiac arrest [facility]) [I46.9]  Aspiration into airway [T17.908A]       Precautions: aspiration       ASSESSMENT :  Based on the objective data described below, the patient presents with moderate oral dysphagia, pharyngeal phase WFL, concerns for esophageal dysphagia. Pt seen in ED, admitted for aspiration pna and cardiac arrest related to aspiration. Food was removed from airway at time of resuscitation per report. Pt was seen by SLP previously in acute setting and MBS completed 5- with notes of oral dysphagia, pharyngeal phase WFL without observed pen/asp. Pt was recommended for soft and bite size/thin diet at that time. Recs for GI consult noted due to persistent belching. Pt hx includes dementia, GERD, dysphagia, CVA. Right weakness is noted. Oral phase: pt does not follow oral commands, right side appears mildly weaker than left. Limited dentition present (top front teeth present). Vocal quality is weak and hoarse. Pt accepts limited trials of thin via cup, puree and min trial solids.  Oral phase with delayed a-p propulsion, reduced efficacy of mastication and reduced bolus awareness. Excessive lingual movement is noted. Pharyngeal phase appears WFL once initiated. Pt with cough at baseline and x 1 after completion of trials. Belching is again noted this date. Patient will benefit from skilled intervention to address the above impairments. Patients rehabilitation potential is considered to be Fair     PLAN :  Recommendations and Planned Interventions: At this time, recommend diet downgrade to puree/thin with 1:1 assistance with ALL PO intake, STRICT aspiration and GERD precautions, monitor pt closely for s/s aspiration, meds crushed if able in puree, FEED ONLY IF AWAKE AND ALERT. Recommend GI consult to further assess esophageal function. Repeat MBS if/as indicated pending pt's diet tolerance and sw safety. Frequency/Duration: Patient will be followed by speech-language pathology 5 times a week to address goals. Discharge Recommendations: cont SLP tx for dysphagia, diet tolerance, sw safety. SUBJECTIVE:   Patient alert, confused, agreeable to limited trials. Pt requesting and perseverating on a baby book. OBJECTIVE:     CXR Results  (Last 48 hours)                 11/27/22 1429  XR CHEST PORT Final result    Impression:  1. No acute disease           Narrative:  INDICATION:  s/p food  aspiration        Exam: Portable chest 1428. Comparison: 5/26/2022. Findings: Cardiomediastinal silhouette is within normal limits. Pulmonary   vasculature is not engorged. There are no focal parenchymal opacities,   effusions, or pneumothorax. CT Results  (Last 48 hours)                 11/27/22 1710  CT CHEST WO CONT Final result    Impression:  1. Patchy bilateral groundglass opacities likely infectious or inflammatory. Narrative:  EXAM:  CT CHEST WO CONT   INDICATION:  persistent cough   COMPARISON: None.    .   TECHNIQUE: Unenhanced multislice helical CT was performed from the thoracic   inlet to the adrenal glands without intravenous contrast administration. Contiguous 5 mm axial images were reconstructed and lung and soft tissue windows   were generated. Coronal and sagittal reformations were generated. CT dose   reduction was achieved through use of a standardized protocol tailored for this   examination and automatic exposure control for dose modulation. Jen Yin FINDINGS:   CHEST:   Chest wall/thoracic inlet: Within normal limits. Thyroid: Within normal limits. Mediastinum/landon: There are no pathologically enlarged mediastinal, hilar or   axillary nodes. Heart/vessels: Heart is normal in size. Mild coronary artery calcifications. Aortic arch is normal in caliber. No significant atherosclerotic vascular   change. Lungs/Pleura: Patchy subtle groundglass opacities bilaterally. No pneumothorax   or effusion. .   ABDOMEN:   Incidentally imaged portions of the abdomen appear unremarkable. .   MSK: Within normal limits. Jen Yin 11/27/22 1430  CT HEAD WO CONT Final result    Impression:      1. No acute abnormality   2. Hypertrophied soft tissue posterior to the dens resulting in severe narrowing   of the canal C1. This has not significantly changed               Narrative:  EXAM: CT HEAD WO CONT       INDICATION: syncope       COMPARISON: 9/30/2013. CONTRAST: None. TECHNIQUE: Unenhanced CT of the head was performed using 5 mm images. Brain and   bone windows were generated. Coronal and sagittal reformats. CT dose reduction   was achieved through use of a standardized protocol tailored for this   examination and automatic exposure control for dose modulation. FINDINGS:   The ventricles and sulci are normal in size, shape and configuration. . Minimal   low density in the periventricular white matter. There is no intracranial   hemorrhage, extra-axial collection, or mass effect. The basilar cisterns are   open.  No CT evidence of acute infarct. The bone windows demonstrate radiodense soft tissue posterior to the dens   resulting in severe narrowing of the canal at the level of C1. This has not   significantly changed. The visualized portions of the paranasal sinuses and   mastoid air cells are clear. Past Medical History:   Diagnosis Date    Cognitive communication deficit     Diabetes ([address]     Dysphagia     Gastrointestinal disorder     GERD (gastroesophageal reflux disease)     Hallucinations     Hypertension     Major depressive disorder, recurrent episode, mild ([address]     Stroke ([facility]    History reviewed. No pertinent surgical history. Prior Level of Function/Home Situation:   Home Situation  Patient Expects to be Discharged to[de-identified] Home  Diet prior to admission: soft and bite size/thin  Current Diet:  soft and bite size/thin   Cognitive and Communication Status:  Neurologic State: Alert  Orientation Level: Oriented to person, Disoriented to situation, Disoriented to place, Disoriented to time    Pain:  0    After treatment:   Patient left in no apparent distress in bed and Nursing notified    COMMUNICATION/EDUCATION:   Patient was educated regarding purpose of SLP assessment, POC, diet recs and sw safety precautions. Patient demonstrated Guarded understanding as evidenced by AMS, hx dementia. The patient's plan of care including recommendations, planned interventions, and recommended diet changes were discussed with: Registered nurse and NP . Patient is unable to participate in goal setting and plan of care.     Thank you for this referral.  Nestor Baron M.S. CCC-SLP  Time Calculation: 10 mins

## 2022-11-28 NOTE — PROGRESS NOTES
Hospitalist Progress Note         VELMA Valerio, FNP-C    Daily Progress Note: 11/28/2022      Subjective:   Subjective   Patient examined alert and confused sitting in hallway singing gospel. She is a poor historian. Review of Systems:   Review of Systems   Unable to perform ROS: Dementia   Gastrointestinal:  Negative for nausea. Objective:   Objective      Vitals:  Patient Vitals for the past 12 hrs:   Pulse Resp BP SpO2   11/28/22 0922 70 15 124/80 100 %        Physical Exam  Vitals and nursing note reviewed. Constitutional:       Appearance: Normal appearance. Eyes:      Extraocular Movements: Extraocular movements intact. Cardiovascular:      Rate and Rhythm: Normal rate. Pulmonary:      Breath sounds: Normal breath sounds. Neurological:      Mental Status: She is alert and oriented to person, place, and time. Lab Results:  Recent Results (from the past 24 hour(s))   CBC WITH AUTOMATED DIFF    Collection Time: 11/27/22  2:35 PM   Result Value Ref Range    WBC 8.5 3.6 - 11.0 K/uL    RBC 4.43 3.80 - 5.20 M/uL    HGB 12.9 11.5 - 16.0 g/dL    HCT 41.4 35.0 - 47.0 %    MCV 93.5 80.0 - 99.0 FL    MCH 29.1 26.0 - 34.0 PG    MCHC 31.2 30.0 - 36.5 g/dL    RDW 17.2 (H) 11.5 - 14.5 %    PLATELET 078 461 - 863 K/uL    MPV 9.7 8.9 - 12.9 FL    NRBC 0.0 0.0  WBC    ABSOLUTE NRBC 0.00 0.00 - 0.01 K/uL    NEUTROPHILS 66 32 - 75 %    LYMPHOCYTES 18 12 - 49 %    MONOCYTES 8 5 - 13 %    EOSINOPHILS 7 0 - 7 %    BASOPHILS 0 0 - 1 %    IMMATURE GRANULOCYTES 1 (H) 0 - 0.5 %    ABS. NEUTROPHILS 5.6 1.8 - 8.0 K/UL    ABS. LYMPHOCYTES 1.5 0.8 - 3.5 K/UL    ABS. MONOCYTES 0.7 0.0 - 1.0 K/UL    ABS. EOSINOPHILS 0.6 (H) 0.0 - 0.4 K/UL    ABS. BASOPHILS 0.0 0.0 - 0.1 K/UL    ABS. IMM.  GRANS. 0.1 (H) 0.00 - 0.04 K/UL    DF AUTOMATED      RBC COMMENTS Normocytic, Normochromic     PROTHROMBIN TIME + INR    Collection Time: 11/27/22  2:35 PM   Result Value Ref Range    Prothrombin time 14.4 11.9 - 14.6 sec    INR 1.2 (H) 0.9 - 1.1     METABOLIC PANEL, COMPREHENSIVE    Collection Time: 11/27/22  2:35 PM   Result Value Ref Range    Sodium 141 136 - 145 mmol/L    Potassium 4.2 3.5 - 5.1 mmol/L    Chloride 113 (H) 97 - 108 mmol/L    CO2 15 (LL) 21 - 32 mmol/L    Anion gap 13 5 - 15 mmol/L    Glucose 153 (H) 65 - 100 mg/dL    BUN 27 (H) 6 - 20 mg/dL    Creatinine 1.17 (H) 0.55 - 1.02 mg/dL    BUN/Creatinine ratio 23 (H) 12 - 20      eGFR 45 (L) >60 ml/min/1.73m2    Calcium 7.1 (L) 8.5 - 10.1 mg/dL    Bilirubin, total 0.3 0.2 - 1.0 mg/dL    AST (SGOT) 13 (L) 15 - 37 U/L    ALT (SGPT) 11 (L) 12 - 78 U/L    Alk.  phosphatase 130 (H) 45 - 117 U/L    Protein, total 6.7 6.4 - 8.2 g/dL    Albumin 2.8 (L) 3.5 - 5.0 g/dL    Globulin 3.9 2.0 - 4.0 g/dL    A-G Ratio 0.7 (L) 1.1 - 2.2     TROPONIN-HIGH SENSITIVITY    Collection Time: 11/27/22  2:35 PM   Result Value Ref Range    Troponin-High Sensitivity 9 0 - 51 ng/L   LACTIC ACID    Collection Time: 11/27/22  2:35 PM   Result Value Ref Range    Lactic acid 2.8 (HH) 0.4 - 2.0 mmol/L   MAGNESIUM    Collection Time: 11/27/22  2:35 PM   Result Value Ref Range    Magnesium 1.7 1.6 - 2.4 mg/dL   CK    Collection Time: 11/27/22  2:35 PM   Result Value Ref Range    CK 56.76 26 - 192 U/L   BLOOD GAS, ARTERIAL    Collection Time: 11/27/22  2:46 PM   Result Value Ref Range    pH 7.25 (L) 7.35 - 7.45      PCO2 33 (L) 35 - 45 mmHg    PO2 81 80 - 100 mmHg    BICARBONATE 14 (L) 22 - 26 mmol/L    BASE DEFICIT 11.9 mmol/L    O2 METHOD Room air      FIO2 21 %    Sample source Arterial      SITE Left Radial      JOHN'S TEST YES      Carboxy-Hgb 0.3 (L) 1 - 2 %    Methemoglobin 0.3 0 - 1.4 %    Oxyhemoglobin 95.1 95 - 99 %    Performed by Chiquita Merino    EKG, 12 LEAD, INITIAL    Collection Time: 11/27/22  3:17 PM   Result Value Ref Range    Ventricular Rate 83 BPM    Atrial Rate 83 BPM    P-R Interval 150 ms    QRS Duration 118 ms    Q-T Interval 381 ms    QTC Calculation (Bezet) 448 ms Calculated P Axis 65 degrees    Calculated R Axis 14 degrees    Calculated T Axis 43 degrees    Diagnosis       Sinus rhythm  Nonspecific intraventricular conduction delay  Low voltage, precordial leads  Minimal ST depression, lateral leads    Confirmed by Thomas Colvin ([id number]) on 11/28/2022 9:46:59 AM     EKG, 12 LEAD, SUBSEQUENT    Collection Time: 11/28/22  2:09 AM   Result Value Ref Range    Ventricular Rate 84 BPM    Atrial Rate 84 BPM    P-R Interval 154 ms    QRS Duration 50 ms    Q-T Interval 396 ms    QTC Calculation (Bezet) 467 ms    Calculated P Axis 85 degrees    Calculated R Axis 7 degrees    Calculated T Axis 53 degrees    Diagnosis       Normal sinus rhythm  Nonspecific ST abnormality  Abnormal ECG  When compared with ECG of 27-NOV-2022 15:17,  PREVIOUS ECG IS PRESENT  Confirmed by Thomas Colvin ([id number]) on 11/28/2022 11:23:10 AM     RENAL FUNCTION PANEL    Collection Time: 11/28/22  2:24 AM   Result Value Ref Range    Sodium 141 136 - 145 mmol/L    Potassium 5.1 3.5 - 5.1 mmol/L    Chloride 118 (H) 97 - 108 mmol/L    CO2 13 (LL) 21 - 32 mmol/L    Anion gap 10 5 - 15 mmol/L    Glucose 176 (H) 65 - 100 mg/dL    BUN 22 (H) 6 - 20 mg/dL    Creatinine 0.91 0.55 - 1.02 mg/dL    BUN/Creatinine ratio 24 (H) 12 - 20      eGFR >60 >60 ml/min/1.73m2    Calcium 7.2 (L) 8.5 - 10.1 mg/dL    Phosphorus 2.1 (L) 2.6 - 4.7 mg/dL    Albumin 2.9 (L) 3.5 - 5.0 g/dL      Results       Procedure Component Value Units Date/Time    COVID-19 WITH INFLUENZA A/B [[id number]] Collected: 11/27/22 1900    Order Status: Canceled Specimen: Nasopharyngeal from Nasopharynx     CULTURE, BLOOD, PAIRED [[id number]]     Order Status: Sent Specimen: Blood              Diagnostic Images:  CT Results  (Last 48 hours)                 11/27/22 1710  CT CHEST WO CONT Final result    Impression:  1. Patchy bilateral groundglass opacities likely infectious or inflammatory.         Narrative:  EXAM:  CT CHEST WO CONT   INDICATION:  persistent cough COMPARISON: None. .   TECHNIQUE: Unenhanced multislice helical CT was performed from the thoracic   inlet to the adrenal glands without intravenous contrast administration. Contiguous 5 mm axial images were reconstructed and lung and soft tissue windows   were generated. Coronal and sagittal reformations were generated. CT dose   reduction was achieved through use of a standardized protocol tailored for this   examination and automatic exposure control for dose modulation. Dianne Smyth FINDINGS:   CHEST:   Chest wall/thoracic inlet: Within normal limits. Thyroid: Within normal limits. Mediastinum/landon: There are no pathologically enlarged mediastinal, hilar or   axillary nodes. Heart/vessels: Heart is normal in size. Mild coronary artery calcifications. Aortic arch is normal in caliber. No significant atherosclerotic vascular   change. Lungs/Pleura: Patchy subtle groundglass opacities bilaterally. No pneumothorax   or effusion. .   ABDOMEN:   Incidentally imaged portions of the abdomen appear unremarkable. .   MSK: Within normal limits. Dianne Addisonon 11/27/22 1430  CT HEAD WO CONT Final result    Impression:      1. No acute abnormality   2. Hypertrophied soft tissue posterior to the dens resulting in severe narrowing   of the canal C1. This has not significantly changed               Narrative:  EXAM: CT HEAD WO CONT       INDICATION: syncope       COMPARISON: 9/30/2013. CONTRAST: None. TECHNIQUE: Unenhanced CT of the head was performed using 5 mm images. Brain and   bone windows were generated. Coronal and sagittal reformats. CT dose reduction   was achieved through use of a standardized protocol tailored for this   examination and automatic exposure control for dose modulation. FINDINGS:   The ventricles and sulci are normal in size, shape and configuration. . Minimal   low density in the periventricular white matter.  There is no intracranial   hemorrhage, extra-axial collection, or mass effect. The basilar cisterns are   open. No CT evidence of acute infarct. The bone windows demonstrate radiodense soft tissue posterior to the dens   resulting in severe narrowing of the canal at the level of C1. This has not   significantly changed. The visualized portions of the paranasal sinuses and   mastoid air cells are clear.                     No orders to display             Current Medications:    Current Facility-Administered Medications:     sertraline (ZOLOFT) tablet 25 mg, 25 mg, Oral, DAILY, Henrietta Ortega MD, 25 mg at 11/28/22 1130    aspirin delayed-release tablet 81 mg, 81 mg, Oral, DAILY, Henrietta Ortega MD, 81 mg at 11/28/22 1130    latanoprost (XALATAN) 0.005 % ophthalmic solution 1 Drop, 1 Drop, Both Eyes, QHS, Henrietta Ortega MD    losartan (COZAAR) tablet 50 mg, 50 mg, Oral, DAILY, Henrietta Ortega MD, 50 mg at 11/28/22 1130    pantoprazole (PROTONIX) tablet 40 mg, 40 mg, Oral, DAILY, Henrietta Ortega MD, 40 mg at 11/28/22 1130    carvediloL (COREG) tablet 3.125 mg, 3.125 mg, Oral, BID WITH MEALS, Henrietta Ortega MD, 3.125 mg at 11/28/22 1130    sodium chloride (NS) flush 5-40 mL, 5-40 mL, IntraVENous, Q8H, Henrietta Ortega MD    sodium chloride (NS) flush 5-40 mL, 5-40 mL, IntraVENous, PRN, Henrietta Ortega MD    acetaminophen (TYLENOL) tablet 650 mg, 650 mg, Oral, Q6H PRN **OR** acetaminophen (TYLENOL) suppository 650 mg, 650 mg, Rectal, Q6H PRN, Henrietta Ortega MD    ondansetron (ZOFRAN ODT) tablet 4 mg, 4 mg, Oral, Q6H PRN **OR** ondansetron (ZOFRAN) injection 4 mg, 4 mg, IntraVENous, Q6H PRN, Henrietta Ortega MD    albuterol (PROVENTIL HFA, VENTOLIN HFA, PROAIR HFA) inhaler 2 Puff, 2 Puff, Inhalation, Q4H PRN, Henrietta Ortega MD    [START ON 11/30/2022] levoFLOXacin (LEVAQUIN) tablet 750 mg, 750 mg, Oral, Q48H, Henrietta Ortega MD    albuterol-ipratropium (DUO-NEB) 2.5 MG-0.5 MG/3 ML, 3 mL, Nebulization, Q6H PRN, Bernardo Antonio MD    Current Outpatient Medications:     carvediloL (COREG) 3.125 mg tablet, Take 1 Tablet by mouth two (2) times daily (with meals). Indications: high blood pressure, Disp: 60 Tablet, Rfl: 0    aspirin delayed-release 81 mg tablet, Take 81 mg by mouth daily. , Disp: , Rfl:     latanoprost (XALATAN) 0.005 % ophthalmic solution, Administer 1 Drop to both eyes nightly., Disp: , Rfl:     losartan (COZAAR) 50 mg tablet, Take 50 mg by mouth daily. , Disp: , Rfl:     omeprazole (PRILOSEC) 20 mg capsule, Take 20 mg by mouth daily. , Disp: , Rfl:     potassium chloride SR (KLOR-CON 10) 10 mEq tablet, Take 20 mEq by mouth daily. , Disp: , Rfl:     sertraline (ZOLOFT) 50 mg tablet, Take 50 mg by mouth daily. Indications: major depressive disorder, Disp: , Rfl:     acetaminophen (TYLENOL) 500 mg tablet, Take 500 mg by mouth every four (4) hours as needed for Pain., Disp: , Rfl:     loperamide (IMODIUM) 2 mg capsule, Take 4 mg by mouth as needed for Diarrhea. Give 2 capsules by mouth every hour as needed for diarrhea after each loose stool up to 4 doses, Disp: , Rfl:        ASSESSMENT:  81 y.o. female with advanced dementia, hypertension, for long time bedridden status resident of long-term care facility found on the floor unconscious. Staff hold some sounds from her laying down, when they went to the room she was on the floor and not breathing found with no pulse. Started CPR and called EMS. CPR done for 10 minutes with pulse back, when EMT arrived they were able to expel the food bolus that was obstructing from the mouth. Patient was back to herself, has significant cough. EMT did not continue any CPR and brought to the emergency room. She is alert but with wheezing and mild respiratory distress. Was given breathing treatments twice with much improvement still with some wheezing and rhonchi present. Transferred here for further care.     Patient is in the hallway bed pleasant not in any distress, she is just talking but not related to our questions and she cannot carry on a conversation. Cardiac arrest   -EKG shows NSR   -obtain cards eval    Pneumonia  Aspiration of food bolus   -obtain SLP eval   -continue oral Levaquin to cover for aspiration   -PCN allergy noted    Hypertension   -bp currently at goal   -continue coreg 3.125mg bid, losartan 50mg daily, asa 81mg    Senile dementia   -continue supportive care    HX depression   -managed with zoloft 25mg daily        Full Code  Dvt Prophylaxis: scd's  GI Prophylaxis: protonix      Above treatment plan reviewed and discussed with staff in detail at bedside, all questions answered. Care Plan discussed with: Nurse    Total time spent with patient: 30 minutes.     Nick Johnson NP

## 2022-11-29 ENCOUNTER — APPOINTMENT (OUTPATIENT)
Dept: NON INVASIVE DIAGNOSTICS | Age: 87
DRG: 177 | End: 2022-11-29
Attending: HOSPITALIST
Payer: MEDICARE

## 2022-11-29 LAB
ALBUMIN SERPL-MCNC: 2.9 G/DL (ref 3.5–5)
ALBUMIN/GLOB SERPL: 0.9 {RATIO} (ref 1.1–2.2)
ALP SERPL-CCNC: 116 U/L (ref 45–117)
ALT SERPL-CCNC: 13 U/L (ref 12–78)
ANION GAP SERPL CALC-SCNC: 7 MMOL/L (ref 5–15)
AST SERPL W P-5'-P-CCNC: 7 U/L (ref 15–37)
BILIRUB SERPL-MCNC: 0.5 MG/DL (ref 0.2–1)
BUN SERPL-MCNC: 20 MG/DL (ref 6–20)
BUN/CREAT SERPL: 22 (ref 12–20)
CA-I BLD-MCNC: 7.5 MG/DL (ref 8.5–10.1)
CHLORIDE SERPL-SCNC: 118 MMOL/L (ref 97–108)
CO2 SERPL-SCNC: 17 MMOL/L (ref 21–32)
CREAT SERPL-MCNC: 0.89 MG/DL (ref 0.55–1.02)
ERYTHROCYTE [DISTWIDTH] IN BLOOD BY AUTOMATED COUNT: 16.6 % (ref 11.5–14.5)
GLOBULIN SER CALC-MCNC: 3.4 G/DL (ref 2–4)
GLUCOSE SERPL-MCNC: 105 MG/DL (ref 65–100)
HCT VFR BLD AUTO: 37.4 % (ref 35–47)
HGB BLD-MCNC: 12.3 G/DL (ref 11.5–16)
MCH RBC QN AUTO: 29.9 PG (ref 26–34)
MCHC RBC AUTO-ENTMCNC: 32.9 G/DL (ref 30–36.5)
MCV RBC AUTO: 91 FL (ref 80–99)
NRBC # BLD: 0 K/UL (ref 0–0.01)
NRBC BLD-RTO: 0 PER 100 WBC
PLATELET # BLD AUTO: 244 K/UL (ref 150–400)
PMV BLD AUTO: 10.4 FL (ref 8.9–12.9)
POTASSIUM SERPL-SCNC: 4.4 MMOL/L (ref 3.5–5.1)
PROT SERPL-MCNC: 6.3 G/DL (ref 6.4–8.2)
RBC # BLD AUTO: 4.11 M/UL (ref 3.8–5.2)
SODIUM SERPL-SCNC: 142 MMOL/L (ref 136–145)
TSH SERPL DL<=0.05 MIU/L-ACNC: 0.35 UIU/ML (ref 0.36–3.74)
WBC # BLD AUTO: 21.5 K/UL (ref 3.6–11)

## 2022-11-29 PROCEDURE — 94640 AIRWAY INHALATION TREATMENT: CPT

## 2022-11-29 PROCEDURE — 84443 ASSAY THYROID STIM HORMONE: CPT

## 2022-11-29 PROCEDURE — 94761 N-INVAS EAR/PLS OXIMETRY MLT: CPT

## 2022-11-29 PROCEDURE — 74011250636 HC RX REV CODE- 250/636: Performed by: HOSPITALIST

## 2022-11-29 PROCEDURE — 80053 COMPREHEN METABOLIC PANEL: CPT

## 2022-11-29 PROCEDURE — 77010033678 HC OXYGEN DAILY

## 2022-11-29 PROCEDURE — 36415 COLL VENOUS BLD VENIPUNCTURE: CPT

## 2022-11-29 PROCEDURE — 65270000029 HC RM PRIVATE

## 2022-11-29 PROCEDURE — 85027 COMPLETE CBC AUTOMATED: CPT

## 2022-11-29 PROCEDURE — 74011250637 HC RX REV CODE- 250/637: Performed by: HOSPITALIST

## 2022-11-29 PROCEDURE — 93306 TTE W/DOPPLER COMPLETE: CPT

## 2022-11-29 PROCEDURE — 74011000250 HC RX REV CODE- 250: Performed by: HOSPITALIST

## 2022-11-29 RX ORDER — ENOXAPARIN SODIUM 100 MG/ML
40 INJECTION SUBCUTANEOUS
Status: DISCONTINUED | OUTPATIENT
Start: 2022-11-29 | End: 2022-12-01 | Stop reason: HOSPADM

## 2022-11-29 RX ADMIN — IPRATROPIUM BROMIDE AND ALBUTEROL SULFATE 3 ML: .5; 2.5 SOLUTION RESPIRATORY (INHALATION) at 17:53

## 2022-11-29 RX ADMIN — ASPIRIN 81 MG: 81 TABLET, COATED ORAL at 09:25

## 2022-11-29 RX ADMIN — ACETAMINOPHEN 650 MG: 325 TABLET ORAL at 16:55

## 2022-11-29 RX ADMIN — ENOXAPARIN SODIUM 40 MG: 100 INJECTION SUBCUTANEOUS at 09:25

## 2022-11-29 RX ADMIN — PANTOPRAZOLE SODIUM 40 MG: 40 TABLET, DELAYED RELEASE ORAL at 09:25

## 2022-11-29 RX ADMIN — CARVEDILOL 3.12 MG: 3.12 TABLET, FILM COATED ORAL at 16:55

## 2022-11-29 RX ADMIN — LOSARTAN POTASSIUM 50 MG: 50 TABLET, FILM COATED ORAL at 09:25

## 2022-11-29 RX ADMIN — SODIUM CHLORIDE, PRESERVATIVE FREE 10 ML: 5 INJECTION INTRAVENOUS at 21:13

## 2022-11-29 RX ADMIN — SODIUM CHLORIDE, PRESERVATIVE FREE 10 ML: 5 INJECTION INTRAVENOUS at 04:32

## 2022-11-29 RX ADMIN — SERTRALINE 25 MG: 25 TABLET, FILM COATED ORAL at 09:25

## 2022-11-29 RX ADMIN — SODIUM CHLORIDE, PRESERVATIVE FREE 10 ML: 5 INJECTION INTRAVENOUS at 15:21

## 2022-11-29 RX ADMIN — CARVEDILOL 3.12 MG: 3.12 TABLET, FILM COATED ORAL at 09:25

## 2022-11-29 NOTE — CONSULTS
Spine Surgery Consult Note    Patient: Sarah Chery MRN: [id number]  SSN: [id number]    YOB: 1935  Age: 81 y.o. 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 I have discussed this issue with the patient's brother and niece at bedside at the patient time dementia. Has base no further work-up or intervention is anticipated at this time. I will sign off. Thank you for allowing me to participate in the care of this patient. Subjective:      Sarah Chery is a 81 y.o. 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 She is a nonambulator, essentially bedbound for years with extensive contractures of the right upper extremity secondary to stroke. She is also demented with significant short-term memory deficits according to the patient's niece and brother are at bedside. I was asked to see the patient because of the concern for the stenosis noted on CT of the head obtained recently to evaluate for acute stroke. The patient does communicate somewhat with her family members but was not particularly communicative with me. She was able to move her neck without difficulty and did not have any evident pain based on her behavior.     Past Medical History:   Diagnosis Date    Cognitive communication deficit     Diabetes ([facility] [address]     Dysphagia     Gastrointestinal disorder     GERD (gastroesophageal reflux disease)     Hallucinations     Hypertension     Major depressive disorder, recurrent episode, [facility]     Stroke [facility])      History reviewed. No pertinent surgical history.    Family History   Family history unknown: Yes     Social History     Tobacco Use    Smoking status: Never    Smokeless tobacco: Never   Substance Use Topics    Alcohol use: Never      Current Facility-Administered Medications   Medication Dose Route Frequency Provider Last Rate Last Admin    enoxaparin (LOVENOX) injection 40 mg  40 mg SubCUTAneous DAILY AFTER BREAKFAST Quoc Boyd MD   40 mg at 11/29/22 0925    sertraline (ZOLOFT) tablet 25 mg  25 mg Oral DAILY Wanda Shepherd MD   25 mg at 11/29/22 5419    aspirin delayed-release tablet 81 mg  81 mg Oral DAILY Wanda Shepherd MD   81 mg at 11/29/22 0925    latanoprost (XALATAN) 0.005 % ophthalmic solution 1 Drop  1 Drop Both Eyes QHS Wanda Shepherd MD        losartan (COZAAR) tablet 50 mg  50 mg Oral DAILY Wanda Shepherd MD   50 mg at 11/29/22 0925    pantoprazole (PROTONIX) tablet 40 mg  40 mg Oral DAILY Wanda Shepherd MD   40 mg at 11/29/22 0925    carvediloL (COREG) tablet 3.125 mg  3.125 mg Oral BID WITH MEALS Wanda Shepherd MD   3.125 mg at 11/29/22 0925    sodium chloride (NS) flush 5-40 mL  5-40 mL IntraVENous Q8H Wanda Shepherd MD   10 mL at 11/29/22 1521    sodium chloride (NS) flush 5-40 mL  5-40 mL IntraVENous PRN Wanda Shepherd MD        acetaminophen (TYLENOL) tablet 650 mg  650 mg Oral Q6H PRN Wanda Shepherd MD        Or    acetaminophen (TYLENOL) suppository 650 mg  650 mg Rectal Q6H PRN Wanda Shepherd MD        ondansetron (ZOFRAN ODT) tablet 4 mg  4 mg Oral Q6H PRN Wanda Shepherd MD        Or    ondansetron (ZOFRAN) injection 4 mg  4 mg IntraVENous Q6H PRN Liz Primitivo Lopez MD        albuterol (PROVENTIL HFA, VENTOLIN HFA, PROAIR HFA) inhaler 2 Puff  2 Puff Inhalation Q4H PRN Patricia Brewer MD        [START ON 11/30/2022] levoFLOXacin (LEVAQUIN) tablet 750 mg  750 mg Oral Q48H Patricia Brewer MD        albuterol-ipratropium (DUO-NEB) 2.5 MG-0.5 MG/3 ML  3 mL Nebulization Q6H PRN Patricia Brewer MD            Allergies   Allergen Reactions    Pcn [Penicillins] Unknown (comments)       Review of Systems:  Review of systems not obtained due to patient factors. Objective:     Vitals:    11/29/22 1200 11/29/22 1215 11/29/22 1415 11/29/22 1600   BP:       Pulse: 74   74   Resp:       Temp:  98.6 °F (37 °C)     SpO2:       Weight:       Height:   5' 7\" (1.702 m)         Physical Exam:      Free movement of the neck without evident pain. Left upper extremity motor function appears intact although patient does not follow commands adequately. Right upper extremity with extensive flexion contractures with some pain evident on her face with attempted straightening. Imaging Review:    CT scan of the head shows soft tissue expansion behind the odontoid process consistent with extensive soft tissue expansion from osteoarthritic changes. This resulted in moderate stenosis but the visualized cord is only slightly deformed anteriorly.     Signed By: Yue Amezquita MD     November 29, 2022

## 2022-11-29 NOTE — ROUTINE PROCESS
TRANSFER - OUT REPORT:    Written report given to Deidra(name) on Chyrl Pick  being transferred to [facility](unit) for routine progression of care       Report consisted of patients Situation, Background, Assessment and   Recommendations(SBAR). Information from the following report(s) SBAR was reviewed with the receiving nurse. Lines:   Peripheral IV 11/28/22 Right Hand (Active)   Site Assessment Clean, dry, & intact 11/28/22 1930   Phlebitis Assessment 0 11/28/22 1930   Infiltration Assessment 0 11/28/22 1930   Dressing Status Clean, dry, & intact 11/28/22 1930        Opportunity for questions and clarification was provided.       Patient transported with:   Monitor

## 2022-11-29 NOTE — PROGRESS NOTES
Hospitalist Progress Note              Daily Progress Note: 11/29/2022      Subjective:   Patient examined alert and was reviewed. Patient opened eyes to verbal command but was mumbling few words. Overall poor historian. No other acute issues reported to me by staff at this time. Objective:     Vitals:  Patient Vitals for the past 12 hrs:   Temp Pulse Resp BP SpO2   11/29/22 0742 98.3 °F (36.8 °C) 79 18 116/68 94 %   11/29/22 0400 -- 77 -- -- --   11/29/22 0000 -- 72 -- -- --   11/28/22 2213 98.1 °F (36.7 °C) 78 18 122/78 100 %          Physical exam:  General chronically ill looking  Neck supple  CVS RRR  Respiratory symmetric expansion  Abdomen soft, ND  Skin no visible rash  Neuro alert but disoriented not cooperative for full exam  Psych agitated easily    Lab Results:  Recent Results (from the past 24 hour(s))   METABOLIC PANEL, COMPREHENSIVE    Collection Time: 11/29/22  6:21 AM   Result Value Ref Range    Sodium 142 136 - 145 mmol/L    Potassium 4.4 3.5 - 5.1 mmol/L    Chloride 118 (H) 97 - 108 mmol/L    CO2 17 (L) 21 - 32 mmol/L    Anion gap 7 5 - 15 mmol/L    Glucose 105 (H) 65 - 100 mg/dL    BUN 20 6 - 20 mg/dL    Creatinine 0.89 0.55 - 1.02 mg/dL    BUN/Creatinine ratio 22 (H) 12 - 20      eGFR >60 >60 ml/min/1.73m2    Calcium 7.5 (L) 8.5 - 10.1 mg/dL    Bilirubin, total 0.5 0.2 - 1.0 mg/dL    AST (SGOT) 7 (L) 15 - 37 U/L    ALT (SGPT) 13 12 - 78 U/L    Alk.  phosphatase 116 45 - 117 U/L    Protein, total 6.3 (L) 6.4 - 8.2 g/dL    Albumin 2.9 (L) 3.5 - 5.0 g/dL    Globulin 3.4 2.0 - 4.0 g/dL    A-G Ratio 0.9 (L) 1.1 - 2.2     CBC W/O DIFF    Collection Time: 11/29/22  6:21 AM   Result Value Ref Range    WBC 21.5 (H) 3.6 - 11.0 K/uL    RBC 4.11 3.80 - 5.20 M/uL    HGB 12.3 11.5 - 16.0 g/dL    HCT 37.4 35.0 - 47.0 %    MCV 91.0 80.0 - 99.0 FL    MCH 29.9 26.0 - 34.0 PG    MCHC 32.9 30.0 - 36.5 g/dL    RDW 16.6 (H) 11.5 - 14.5 %    PLATELET 073 380 - 967 K/uL    MPV 10.4 8.9 - 12.9 FL    NRBC 0.0 0.0  WBC    ABSOLUTE NRBC 0.00 0.00 - 0.01 K/uL   TSH 3RD GENERATION    Collection Time: 11/29/22  6:21 AM   Result Value Ref Range    TSH 0.35 (L) 0.36 - 3.74 uIU/mL      Results       Procedure Component Value Units Date/Time    COVID-19 WITH INFLUENZA A/B [[id number]] Collected: 11/27/22 1900    Order Status: Canceled Specimen: Nasopharyngeal from Nasopharynx     CULTURE, BLOOD, PAIRED [[id number]]     Order Status: Sent Specimen: Blood              Diagnostic Images:  CT Results  (Last 48 hours)                 11/27/22 1710  CT CHEST WO CONT Final result    Impression:  1. Patchy bilateral groundglass opacities likely infectious or inflammatory. Narrative:  EXAM:  CT CHEST WO CONT   INDICATION:  persistent cough   COMPARISON: None. .   TECHNIQUE: Unenhanced multislice helical CT was performed from the thoracic   inlet to the adrenal glands without intravenous contrast administration. Contiguous 5 mm axial images were reconstructed and lung and soft tissue windows   were generated. Coronal and sagittal reformations were generated. CT dose   reduction was achieved through use of a standardized protocol tailored for this   examination and automatic exposure control for dose modulation. Tiffany Maza FINDINGS:   CHEST:   Chest wall/thoracic inlet: Within normal limits. Thyroid: Within normal limits. Mediastinum/landon: There are no pathologically enlarged mediastinal, hilar or   axillary nodes. Heart/vessels: Heart is normal in size. Mild coronary artery calcifications. Aortic arch is normal in caliber. No significant atherosclerotic vascular   change. Lungs/Pleura: Patchy subtle groundglass opacities bilaterally. No pneumothorax   or effusion. .   ABDOMEN:   Incidentally imaged portions of the abdomen appear unremarkable. .   MSK: Within normal limits. Tiffany Maza 11/27/22 1430  CT HEAD WO CONT Final result    Impression:      1. No acute abnormality   2.  Hypertrophied soft tissue posterior to the dens resulting in severe narrowing   of the canal C1. This has not significantly changed               Narrative:  EXAM: CT HEAD WO CONT       INDICATION: syncope       COMPARISON: 9/30/2013. CONTRAST: None. TECHNIQUE: Unenhanced CT of the head was performed using 5 mm images. Brain and   bone windows were generated. Coronal and sagittal reformats. CT dose reduction   was achieved through use of a standardized protocol tailored for this   examination and automatic exposure control for dose modulation. FINDINGS:   The ventricles and sulci are normal in size, shape and configuration. . Minimal   low density in the periventricular white matter. There is no intracranial   hemorrhage, extra-axial collection, or mass effect. The basilar cisterns are   open. No CT evidence of acute infarct. The bone windows demonstrate radiodense soft tissue posterior to the dens   resulting in severe narrowing of the canal at the level of C1. This has not   significantly changed. The visualized portions of the paranasal sinuses and   mastoid air cells are clear.                     Current Medications:    Current Facility-Administered Medications:     sertraline (ZOLOFT) tablet 25 mg, 25 mg, Oral, DAILY, Navjot Garza MD, 25 mg at 11/28/22 1130    aspirin delayed-release tablet 81 mg, 81 mg, Oral, DAILY, Navjot Garza MD, 81 mg at 11/28/22 1130    latanoprost (XALATAN) 0.005 % ophthalmic solution 1 Drop, 1 Drop, Both Eyes, QHS, Navjot Garza MD    losartan (COZAAR) tablet 50 mg, 50 mg, Oral, DAILY, Navjot Garza MD, 50 mg at 11/28/22 1130    pantoprazole (PROTONIX) tablet 40 mg, 40 mg, Oral, DAILY, Navjot Garza MD, 40 mg at 11/28/22 1130    carvediloL (COREG) tablet 3.125 mg, 3.125 mg, Oral, BID WITH MEALS, Navjot Garza MD, 3.125 mg at 11/28/22 1130    sodium chloride (NS) flush 5-40 mL, 5-40 mL, IntraVENous, Q8H, Navjot Garza MD, 10 mL at 11/29/22 0432    sodium chloride (NS) flush 5-40 mL, 5-40 mL, IntraVENous, PRN, Danis Dexter MD    acetaminophen (TYLENOL) tablet 650 mg, 650 mg, Oral, Q6H PRN **OR** acetaminophen (TYLENOL) suppository 650 mg, 650 mg, Rectal, Q6H PRN, Danis Dexter MD    ondansetron (ZOFRAN ODT) tablet 4 mg, 4 mg, Oral, Q6H PRN **OR** ondansetron (ZOFRAN) injection 4 mg, 4 mg, IntraVENous, Q6H PRN, Danis Dexter MD    albuterol (PROVENTIL HFA, VENTOLIN HFA, PROAIR HFA) inhaler 2 Puff, 2 Puff, Inhalation, Q4H PRN, Danis Dexter MD    [START ON 11/30/2022] levoFLOXacin (LEVAQUIN) tablet 750 mg, 750 mg, Oral, Q48H, Danis Dexter MD    albuterol-ipratropium (DUO-NEB) 2.5 MG-0.5 MG/3 ML, 3 mL, Nebulization, Q6H PRN, Danis Dexter MD       ASSESSMENT:  81 y.o. female with advanced dementia, hypertension, for long time bedridden status resident of long-term care facility found on the floor unconscious. Staff hold some sounds from her laying down, when they went to the room she was on the floor and not breathing found with no pulse. Started CPR and called EMS. CPR done for 10 minutes with pulse back, when EMT arrived they were able to expel the food bolus that was obstructing from the mouth. Patient was back to herself, has significant cough. EMT did not continue any CPR and brought to the emergency room. She is alert but with wheezing and mild respiratory distress. Was given breathing treatments twice with much improvement still with some wheezing and rhonchi present. Transferred here for further care. Patient is in the hallway bed pleasant not in any distress, she is just talking but not related to our questions and she cannot carry on a conversation. Cardiac arrest from aspiration-EKG shows NSR, cards on case defer to them, Check Echo    Pneumonia from Aspiration of food bolus -Diet as per SLP roby Levaquin to cover for aspiration,PCN allergy.  WBC 21 K CX not send in ED    Hypertension-continue coreg 3.125mg bid, losartan 50mg daily, asa 81mg    Senile dementia-continue supportive care    Hx depression-managed with zoloft 25mg daily    6. C1 canal stenosis-chr per report will ask for ortho spine eval, considering advance age she may not be a surgical candidate ? Full Code  Dvt Prophylaxis: sq lovenox  NOK- Brother Taye Quinn @  [phone]) .   Dispo: LTC once stable      Quoc Parrish MD

## 2022-11-29 NOTE — CONSULTS
Consult    NAME: Kristin Ayala   :  1935   MRN:  [id number]     Date/Time:  2022 7:45 AM    Patient PCP: Corby Acevedo MD  ________________________________________________________________________     Assessment:   Primary cardiologist: None    PROBLEM LIST:  1. Incomplete database secondary to patient being a poor historian  2. Dementia  3. Dysphagia  4. Gastroesophageal reflux disease (GERD)  5.  Bilateral aspiration pneumonia  6. Status postcardiac arrest secondary to aspiration pneumonia  7. Grade I (mild) diastolic dysfunction, or impaired relaxation  8. Mild pulmonary hypertension (RVSP = 44 mmHg)  9. Previous cerebrovascular accident (CVA)  8. Hypertension    11. Debility  12. Leukocytosis   18. Electrolyte abnormalities (hypocalcemia, and hypophosphatemia)        []        High complexity decision making was performed        Subjective:   CHIEF COMPLAINT:     HISTORY OF PRESENT ILLNESS:     Vinayak Paul, is an 52-year-old -American female with no known coronary artery disease. Due to the patient's impaired cognition she is a poor historian. History is obtained from a review of the patient's medical records. The patient has a history of senile dementia, and is a resident of an area nursing home. Apparently, after a meal she experienced difficulty breathing. She therefore presented to the emergency department. In the emergency department she was noted to be in cardiac arrest most likely secondary to aspiration pneumonia. She was treated appropriately including starting on antibiotics. Subsequently she is admitted to general medicine with telemetry. Cardiology was consulted to assist in the evaluation and management.         Past Medical History:   Diagnosis Date    Cognitive communication deficit     Diabetes ([address]     Dysphagia     Gastrointestinal disorder     GERD (gastroesophageal reflux disease)     Hallucinations     Hypertension     Major depressive disorder, recurrent episode, mild ([address]     Stroke [facility])       History reviewed. No pertinent surgical history. Allergies   Allergen Reactions    Pcn [Penicillins] Unknown (comments)      Meds:  See below  Social History     Tobacco Use    Smoking status: Never    Smokeless tobacco: Never   Substance Use Topics    Alcohol use: Never      Family History   Family history unknown: Yes       REVIEW OF SYSTEMS:     [x]         Unable to obtain  ROS due to impaired cognition. Objective:      Physical Exam:    Last 24hrs VS reviewed since prior progress note. Most recent are:    Visit Vitals  /78 (BP 1 Location: Left lower arm, BP Patient Position: Semi fowlers)   Pulse 77   Temp 98.1 °F (36.7 °C)   Resp 18   Ht 5' 7.01\" (1.702 m)   Wt 72.7 kg (160 lb 4.4 oz)   SpO2 100%   Breastfeeding No   BMI 25.10 kg/m²     No intake or output data in the 24 hours ending 11/29/22 0745     General Appearance: Well developed, in no acute respiratory distress. Ears/Nose/Mouth/Throat: Atraumatic, normocephalic, PERRL. Neck: Supple. Without thyromegaly, lymphadenopathy. Chest: Lungs clear to auscultation bilaterally. Cardiovascular: JVP is not elevated, PMI is not attempted. Normal intensity S1 and S2, without S3. There is an apical S4. Abdomen: Soft, non-tender, bowel sounds are active. No organomegaly. Extremities: No edema bilaterally. Skin: Warm and dry. Neuro: CN II-XII grossly intact, without gross motor/sensory deficit. Data:      Telemetry:    EKG:  []  No new EKG for review  No orders to display        Prior to Admission medications    Medication Sig Start Date End Date Taking? Authorizing Provider   carvediloL (COREG) 3.125 mg tablet Take 1 Tablet by mouth two (2) times daily (with meals). Indications: high blood pressure 5/27/22   Montana Aranda MD   aspirin delayed-release 81 mg tablet Take 81 mg by mouth daily.     Provider, Historical   latanoprost (XALATAN) 0.005 % ophthalmic solution Administer 1 Drop to both eyes nightly. Provider, Historical   losartan (COZAAR) 50 mg tablet Take 50 mg by mouth daily. Provider, Historical   omeprazole (PRILOSEC) 20 mg capsule Take 20 mg by mouth daily. Provider, Historical   potassium chloride SR (KLOR-CON 10) 10 mEq tablet Take 20 mEq by mouth daily. Provider, Historical   sertraline (ZOLOFT) 50 mg tablet Take 50 mg by mouth daily. Indications: major depressive disorder    Provider, Historical   acetaminophen (TYLENOL) 500 mg tablet Take 500 mg by mouth every four (4) hours as needed for Pain. Provider, Historical   loperamide (IMODIUM) 2 mg capsule Take 4 mg by mouth as needed for Diarrhea. Give 2 capsules by mouth every hour as needed for diarrhea after each loose stool up to 4 doses    Provider, Historical       Recent Results (from the past 24 hour(s))   METABOLIC PANEL, COMPREHENSIVE    Collection Time: 11/29/22  6:21 AM   Result Value Ref Range    Sodium 142 136 - 145 mmol/L    Potassium 4.4 3.5 - 5.1 mmol/L    Chloride 118 (H) 97 - 108 mmol/L    CO2 17 (L) 21 - 32 mmol/L    Anion gap 7 5 - 15 mmol/L    Glucose 105 (H) 65 - 100 mg/dL    BUN 20 6 - 20 mg/dL    Creatinine 0.89 0.55 - 1.02 mg/dL    BUN/Creatinine ratio 22 (H) 12 - 20      eGFR >60 >60 ml/min/1.73m2    Calcium 7.5 (L) 8.5 - 10.1 mg/dL    Bilirubin, total 0.5 0.2 - 1.0 mg/dL    AST (SGOT) 7 (L) 15 - 37 U/L    ALT (SGPT) 13 12 - 78 U/L    Alk.  phosphatase 116 45 - 117 U/L    Protein, total 6.3 (L) 6.4 - 8.2 g/dL    Albumin 2.9 (L) 3.5 - 5.0 g/dL    Globulin 3.4 2.0 - 4.0 g/dL    A-G Ratio 0.9 (L) 1.1 - 2.2     CBC W/O DIFF    Collection Time: 11/29/22  6:21 AM   Result Value Ref Range    WBC 21.5 (H) 3.6 - 11.0 K/uL    RBC 4.11 3.80 - 5.20 M/uL    HGB 12.3 11.5 - 16.0 g/dL    HCT 37.4 35.0 - 47.0 %    MCV 91.0 80.0 - 99.0 FL    MCH 29.9 26.0 - 34.0 PG    MCHC 32.9 30.0 - 36.5 g/dL    RDW 16.6 (H) 11.5 - 14.5 %    PLATELET 694 900 - 822 K/uL    MPV 10.4 8.9 - 12.9 FL    NRBC 0.0 0. 0  WBC    ABSOLUTE NRBC 0.00 0.00 - 0.01 K/uL   TSH 3RD GENERATION    Collection Time: 11/29/22  6:21 AM   Result Value Ref Range    TSH 0.35 (L) 0.36 - 3.74 uIU/mL          Plan:   1. Continue telemetry monitor for 24-72 hours  2. Obtain serial cardiac enzymes  3. Monitor serum electrolytes, and renal function  4. Monitor fluid balance, daily weights  5. Continue current cardiovascular medications including aspirin, carvedilol, and losartan    6.   Further recommendations depending on above results, and clinical course   Adam Funez MD

## 2022-11-29 NOTE — PROGRESS NOTES
Problem: Patient Education: Go to Patient Education Activity  Goal: Patient/Family Education  Outcome: Progressing Towards Goal     Problem: Pressure Injury - Risk of  Goal: *Prevention of pressure injury  Description: Document Abiel Scale and appropriate interventions in the flowsheet. Outcome: Progressing Towards Goal  Note: Pressure Injury Interventions:  Sensory Interventions: Avoid rigorous massage over bony prominences, Maintain/enhance activity level, Minimize linen layers, Monitor skin under medical devices, Turn and reposition approx. every two hours (pillows and wedges if needed)    Moisture Interventions: Check for incontinence Q2 hours and as needed, Minimize layers, Moisture barrier, Maintain skin hydration (lotion/cream)    Activity Interventions: Pressure redistribution bed/mattress(bed type), PT/OT evaluation    Mobility Interventions: Pressure redistribution bed/mattress (bed type), Turn and reposition approx. every two hours(pillow and wedges)    Nutrition Interventions: Document food/fluid/supplement intake, Discuss nutritional consult with provider    Friction and Shear Interventions: Minimize layers                Problem: Patient Education: Go to Patient Education Activity  Goal: Patient/Family Education  Outcome: Progressing Towards Goal     Problem: Falls - Risk of  Goal: *Absence of Falls  Description: Document Monica Litter Fall Risk and appropriate interventions in the flowsheet.   Outcome: Progressing Towards Goal  Note: Fall Risk Interventions:  Mobility Interventions: Bed/chair exit alarm, OT consult for ADLs, PT Consult for mobility concerns, PT Consult for assist device competence    Mentation Interventions: Bed/chair exit alarm, Adequate sleep, hydration, pain control, More frequent rounding, Reorient patient, Room close to nurse's station, Toileting rounds    Medication Interventions: Bed/chair exit alarm    Elimination Interventions: Call light in reach, Bed/chair exit alarm

## 2022-11-29 NOTE — PROGRESS NOTES
Comprehensive Nutrition Assessment    Type and Reason for Visit: Initial, Positive nutrition screen (MST 5)    Nutrition Recommendations/Plan:   Continue current diet per SLP recs  Provide assistance during all mealtimes  Continue to monitor and document all PO intake and BMS in I/Os     Malnutrition Assessment:  Malnutrition Status:  No malnutrition (11/29/22 1439)    Context:  Acute illness     Findings of the 6 clinical characteristics of malnutrition:   Energy Intake:  No significant decrease in energy intake  Weight Loss:  No significant weight loss     Body Fat Loss:  No significant body fat loss,     Muscle Mass Loss:  No significant muscle mass loss,    Fluid Accumulation:  No significant fluid accumulation,     Strength:  Not performed     Nutrition Assessment:    Admitted for syncope 2/2 choking on food, remains inpt for mgmt of cardiac arrest r/t aspiration. Pt AxOx1, unable to obtain nutr hx from her. Per EMR, 2# wt loss x6mo (1.1%, insignificant). Pts daughter at bedside, states pt ate normal diet pta w/no h/o dysphagia, states she has good appetite. Continue pureed diet per SLP 11/28 and provide assistance during all mealtimes. Labs: cl 118, co2 17, gluc 105, ca 7.5, AST 7, alb 2.9, TP 6.3. Meds: coreg lovenox, losartan, protonix. Nutrition Related Findings:    Per NFPE, mild muscle loss, likely age-related. Noted pt bedbound, doesn't walk. +dysphagia per SLP, pureed/thin 11/28. no n/v/d/c per daughter. Last BM 11/28. LLE and RLE 2+ edema, normal for pt per daughter. Wound Type: None    Current Nutrition Intake & Therapies:  Average Meal Intake: 51-75%  Average Supplement Intake: None ordered  ADULT DIET Dysphagia - Pureed    Anthropometric Measures:  Height: 5' 7\" (170.2 cm)  Ideal Body Weight (IBW): 135 lbs (61 kg)     Current Body Wt:  73.3 kg (161 lb 9.6 oz), 119.7 % IBW. Bed scale  Current BMI (kg/m2): 25.3        Weight Adjustment: No adjustment                 BMI Category:  Overweight (BMI 25.0-29. 9)    Estimated Daily Nutrient Needs:  Energy Requirements Based On: Kcal/kg  Weight Used for Energy Requirements: Current  Energy (kcal/day): 1685-1832kcals/day (23-25kcal/kg, bedbound)  Weight Used for Protein Requirements: Current  Protein (g/day): 73-88g/day (1-1.2g/kg/day)  Method Used for Fluid Requirements: 1 ml/kcal  Fluid (ml/day): 1685-1832mL    Nutrition Diagnosis:   Swallowing difficulty related to cognitive or neurological impairment as evidenced by swallowing study results    Nutrition Interventions:   Food and/or Nutrient Delivery: Continue current diet  Nutrition Education/Counseling: No recommendations at this time  Coordination of Nutrition Care: Continue to monitor while inpatient, Feeding assistance/environmental change  Plan of Care discussed with: Pt and daughter    Goals:     Goals: Meet at least 75% of estimated needs, by next RD assessment     Nutrition Monitoring and Evaluation:   Behavioral-Environmental Outcomes: None identified  Food/Nutrient Intake Outcomes: Diet advancement/tolerance, Food and nutrient intake  Physical Signs/Symptoms Outcomes: Skin, Fluid status or edema, Chewing or swallowing, Meal time behavior    Discharge Planning:     Too soon to determine    [address]: [id number]

## 2022-11-29 NOTE — PROGRESS NOTES
Problem: Pressure Injury - Risk of  Goal: *Prevention of pressure injury  Description: Document Abiel Scale and appropriate interventions in the flowsheet. Outcome: Progressing Towards Goal  Note: Pressure Injury Interventions:  Sensory Interventions: Assess need for specialty bed, Minimize linen layers, Turn and reposition approx. every two hours (pillows and wedges if needed)    Moisture Interventions: Absorbent underpads, Check for incontinence Q2 hours and as needed, Internal/External urinary devices    Activity Interventions: Assess need for specialty bed, PT/OT evaluation    Mobility Interventions: Assess need for specialty bed, HOB 30 degrees or less, PT/OT evaluation    Nutrition Interventions: Document food/fluid/supplement intake    Friction and Shear Interventions: HOB 30 degrees or less, Minimize layers                Problem: Falls - Risk of  Goal: *Absence of Falls  Description: Document Gloria Fall Risk and appropriate interventions in the flowsheet.   Outcome: Progressing Towards Goal  Note: Fall Risk Interventions:  Mobility Interventions: Bed/chair exit alarm, PT Consult for mobility concerns, OT consult for ADLs    Mentation Interventions: Bed/chair exit alarm, More frequent rounding, Room close to nurse's station    Medication Interventions: Bed/chair exit alarm    Elimination Interventions: Bed/chair exit alarm, Call light in reach

## 2022-11-29 NOTE — ED NOTES
Telemetry box placed on pt and verified by telemetry. Jarett Borjaer on 2east made aware of report being tubed at 2025.  Report tubed to 2east at 2026

## 2022-11-30 LAB
BASOPHILS # BLD: 0.1 K/UL (ref 0–0.1)
BASOPHILS NFR BLD: 0 % (ref 0–1)
DIFFERENTIAL METHOD BLD: ABNORMAL
ECHO AO ROOT DIAM: 2.7 CM
ECHO AO ROOT INDEX: 1.47 CM/M2
ECHO AV AREA PEAK VELOCITY: 2.2 CM2
ECHO AV AREA VTI: 1.7 CM2
ECHO AV AREA/BSA PEAK VELOCITY: 1.2 CM2/M2
ECHO AV AREA/BSA VTI: 0.9 CM2/M2
ECHO AV MEAN GRADIENT: 4 MMHG
ECHO AV MEAN VELOCITY: 0.9 M/S
ECHO AV PEAK GRADIENT: 6 MMHG
ECHO AV PEAK VELOCITY: 1.2 M/S
ECHO AV VELOCITY RATIO: 0.92
ECHO AV VTI: 28.5 CM
ECHO EST RA PRESSURE: 3 MMHG
ECHO LA AREA 2C: 13.8 CM2
ECHO LA AREA 4C: 11.9 CM2
ECHO LA DIAMETER INDEX: 1.52 CM/M2
ECHO LA DIAMETER: 2.8 CM
ECHO LA MAJOR AXIS: 4.1 CM
ECHO LA MINOR AXIS: 4.9 CM
ECHO LA TO AORTIC ROOT RATIO: 1.04
ECHO LA VOL BP: 32 ML (ref 22–52)
ECHO LA VOL/BSA BIPLANE: 17 ML/M2 (ref 16–34)
ECHO LV E' LATERAL VELOCITY: 5 CM/S
ECHO LV E' SEPTAL VELOCITY: 9 CM/S
ECHO LV EDV A2C: 48 ML
ECHO LV EDV A4C: 55 ML
ECHO LV EDV INDEX A4C: 30 ML/M2
ECHO LV EDV NDEX A2C: 26 ML/M2
ECHO LV EJECTION FRACTION A2C: 75 %
ECHO LV EJECTION FRACTION A4C: 49 %
ECHO LV EJECTION FRACTION BIPLANE: 49 % (ref 55–100)
ECHO LV ESV A2C: 12 ML
ECHO LV ESV A4C: 28 ML
ECHO LV ESV INDEX A2C: 7 ML/M2
ECHO LV ESV INDEX A4C: 15 ML/M2
ECHO LV FRACTIONAL SHORTENING: 24 % (ref 28–44)
ECHO LV INTERNAL DIMENSION DIASTOLE INDEX: 2.01 CM/M2
ECHO LV INTERNAL DIMENSION DIASTOLIC: 3.7 CM (ref 3.9–5.3)
ECHO LV INTERNAL DIMENSION SYSTOLIC INDEX: 1.52 CM/M2
ECHO LV INTERNAL DIMENSION SYSTOLIC: 2.8 CM
ECHO LV IVSD: 0.6 CM (ref 0.6–0.9)
ECHO LV MASS 2D: 82.3 G (ref 67–162)
ECHO LV MASS INDEX 2D: 44.7 G/M2 (ref 43–95)
ECHO LV POSTERIOR WALL DIASTOLIC: 1 CM (ref 0.6–0.9)
ECHO LV RELATIVE WALL THICKNESS RATIO: 0.54
ECHO LVOT AREA: 2.5 CM2
ECHO LVOT AV VTI INDEX: 0.67
ECHO LVOT DIAM: 1.8 CM
ECHO LVOT MEAN GRADIENT: 2 MMHG
ECHO LVOT PEAK GRADIENT: 4 MMHG
ECHO LVOT PEAK VELOCITY: 1.1 M/S
ECHO LVOT STROKE VOLUME INDEX: 26.4 ML/M2
ECHO LVOT SV: 48.6 ML
ECHO LVOT VTI: 19.1 CM
ECHO MV A VELOCITY: 0.97 M/S
ECHO MV E DECELERATION TIME (DT): 173 MS
ECHO MV E VELOCITY: 0.78 M/S
ECHO MV E/A RATIO: 0.8
ECHO MV E/E' LATERAL: 15.6
ECHO MV E/E' RATIO (AVERAGED): 12.13
ECHO MV E/E' SEPTAL: 8.67
ECHO RIGHT VENTRICULAR SYSTOLIC PRESSURE (RVSP): 28 MMHG
ECHO RV INTERNAL DIMENSION: 4.9 CM
ECHO TV REGURGITANT MAX VELOCITY: 2.52 M/S
ECHO TV REGURGITANT PEAK GRADIENT: 25 MMHG
EOSINOPHIL # BLD: 0.6 K/UL (ref 0–0.4)
EOSINOPHIL NFR BLD: 4 % (ref 0–7)
ERYTHROCYTE [DISTWIDTH] IN BLOOD BY AUTOMATED COUNT: 16.4 % (ref 11.5–14.5)
HCT VFR BLD AUTO: 36.2 % (ref 35–47)
HGB BLD-MCNC: 11.4 G/DL (ref 11.5–16)
IMM GRANULOCYTES # BLD AUTO: 0.1 K/UL (ref 0–0.04)
IMM GRANULOCYTES NFR BLD AUTO: 1 % (ref 0–0.5)
LYMPHOCYTES # BLD: 1.3 K/UL (ref 0.8–3.5)
LYMPHOCYTES NFR BLD: 10 % (ref 12–49)
MCH RBC QN AUTO: 28.9 PG (ref 26–34)
MCHC RBC AUTO-ENTMCNC: 31.5 G/DL (ref 30–36.5)
MCV RBC AUTO: 91.6 FL (ref 80–99)
MONOCYTES # BLD: 0.8 K/UL (ref 0–1)
MONOCYTES NFR BLD: 6 % (ref 5–13)
NEUTS SEG # BLD: 10.5 K/UL (ref 1.8–8)
NEUTS SEG NFR BLD: 79 % (ref 32–75)
NRBC # BLD: 0 K/UL (ref 0–0.01)
NRBC BLD-RTO: 0 PER 100 WBC
PLATELET # BLD AUTO: 224 K/UL (ref 150–400)
PMV BLD AUTO: 10.4 FL (ref 8.9–12.9)
RBC # BLD AUTO: 3.95 M/UL (ref 3.8–5.2)
WBC # BLD AUTO: 13.3 K/UL (ref 3.6–11)

## 2022-11-30 PROCEDURE — 74011250637 HC RX REV CODE- 250/637: Performed by: HOSPITALIST

## 2022-11-30 PROCEDURE — 74011000250 HC RX REV CODE- 250: Performed by: HOSPITALIST

## 2022-11-30 PROCEDURE — 74011250636 HC RX REV CODE- 250/636: Performed by: HOSPITALIST

## 2022-11-30 PROCEDURE — 92526 ORAL FUNCTION THERAPY: CPT

## 2022-11-30 PROCEDURE — 65270000029 HC RM PRIVATE

## 2022-11-30 PROCEDURE — 85025 COMPLETE CBC W/AUTO DIFF WBC: CPT

## 2022-11-30 PROCEDURE — 36415 COLL VENOUS BLD VENIPUNCTURE: CPT

## 2022-11-30 RX ORDER — LOSARTAN POTASSIUM 25 MG/1
25 TABLET ORAL DAILY
Status: DISCONTINUED | OUTPATIENT
Start: 2022-12-01 | End: 2022-12-01 | Stop reason: HOSPADM

## 2022-11-30 RX ORDER — PANTOPRAZOLE SODIUM 40 MG/1
40 GRANULE, DELAYED RELEASE ORAL
Status: DISCONTINUED | OUTPATIENT
Start: 2022-11-30 | End: 2022-12-01 | Stop reason: HOSPADM

## 2022-11-30 RX ORDER — GUAIFENESIN 100 MG/5ML
81 LIQUID (ML) ORAL DAILY
Status: DISCONTINUED | OUTPATIENT
Start: 2022-11-30 | End: 2022-12-01 | Stop reason: HOSPADM

## 2022-11-30 RX ADMIN — SODIUM CHLORIDE, PRESERVATIVE FREE 10 ML: 5 INJECTION INTRAVENOUS at 05:11

## 2022-11-30 RX ADMIN — SERTRALINE 25 MG: 25 TABLET, FILM COATED ORAL at 09:04

## 2022-11-30 RX ADMIN — LATANOPROST 1 DROP: 50 SOLUTION OPHTHALMIC at 22:20

## 2022-11-30 RX ADMIN — ASPIRIN 81 MG CHEWABLE TABLET 81 MG: 81 TABLET CHEWABLE at 09:44

## 2022-11-30 RX ADMIN — CARVEDILOL 3.12 MG: 3.12 TABLET, FILM COATED ORAL at 17:50

## 2022-11-30 RX ADMIN — PANTOPRAZOLE SODIUM 40 MG: 40 GRANULE, DELAYED RELEASE ORAL at 09:44

## 2022-11-30 RX ADMIN — SODIUM CHLORIDE, PRESERVATIVE FREE 10 ML: 5 INJECTION INTRAVENOUS at 21:42

## 2022-11-30 RX ADMIN — CARVEDILOL 3.12 MG: 3.12 TABLET, FILM COATED ORAL at 09:04

## 2022-11-30 RX ADMIN — ENOXAPARIN SODIUM 40 MG: 100 INJECTION SUBCUTANEOUS at 09:04

## 2022-11-30 RX ADMIN — LOSARTAN POTASSIUM 50 MG: 50 TABLET, FILM COATED ORAL at 09:04

## 2022-11-30 RX ADMIN — SODIUM CHLORIDE, PRESERVATIVE FREE 10 ML: 5 INJECTION INTRAVENOUS at 15:13

## 2022-11-30 RX ADMIN — LEVOFLOXACIN 750 MG: 500 TABLET, FILM COATED ORAL at 05:11

## 2022-11-30 NOTE — PROGRESS NOTES
SPEECH LANGUAGE PATHOLOGY DYSPHAGIA TREATMENT  Patient: Brunilda Khoury (55 y.o. female)  Date: 11/30/2022  Diagnosis: Cardiac arrest [facility]) [I46.9]  Aspiration into airway [T17.908A] Cardiac arrest [facility])      Precautions: Aspiration     ASSESSMENT:  Patient on 1.5L O2 NC. NC was on patient's head upon entering. NC placed back in patient's nares. Patient presents with mild oral dysphagia. Oral phase c/b appropriate bolus acceptance, delayed A-P transit, and min oral residue, which is cleared with liquid wash. Pharyngeal phase appears largely [facility] [place]. HLE adequate to palpation. Overt s/s of pen/aspiration c/b cough following liquid wash of SBS. Patient tolerated straw sips thin liquid and puree with no overt s/s of pen/aspiration. Patient had a minimal amount of belching following PO trials. Nsg reports patient is tolerating current diet of puree, thin liquids with no overt s/s of pen/aspiration. PLAN:  Recommendations and Planned Interventions:  Recommend continue puree, thin liquid diet. 1:1 assistance with ALL PO intake, STRICT aspiration and GERD precautions, monitor pt closely for s/s aspiration, meds crushed if able in puree, FEED ONLY IF AWAKE AND ALERT. Rec GI consult to further assess esophageal function. Rec MBS if medically indicated     Patient continues to benefit from skilled intervention to address the above impairments. Continue treatment per established plan of care. Frequency/Duration: Patient will be followed by speech-language pathology 3 times a week to address goals. Discharge Recommendations: To Be Determined     SUBJECTIVE:   Patient is agreeable to beginning treatment     OBJECTIVE:   Cognitive and Communication Status:  Neurologic State: Alert, Confused  Orientation Level: Oriented to person  Cognition: Decreased attention/concentration    Dysphagia Treatment:  Oral Assessment:  P.O.  Trials:  Patient Position:  (HOB raised)  Vocal quality prior to P.O.: No impairment  Consistency Presented: Thin liquid;Puree;Mechanical soft  How Presented: SLP-fed/presented;Straw;Spoon  How Much:  (Multiple presentations)  Bolus Acceptance: No impairment  Bolus Formation/Control: No impairment  Propulsion: Delayed (# of seconds)  Oral Residue: Less than 10% of bolus  Initiation of Swallow: No impairment  Laryngeal Elevation: Functional  Aspiration Signs/Symptoms: Weak cough  Pharyngeal Phase Characteristics: No impairment, issues, or problems   Oral Phase Severity: Mild  Pharyngeal Phase Severity : No impairment    Pain:  Left hand/arm - patient unable to rate - nsg notified     After treatment:   Patient left in no apparent distress in bed, Call bell within reach, and Nursing notified    COMMUNICATION/EDUCATION:   Patient was educated regarding her deficit(s) of dysphagia, swallow safety precautions, diet recs and POC. She demonstrated Guarded understanding as evidenced by AMS. The patient's plan of care including recommendations, planned interventions, and recommended diet changes were discussed with: Registered nurse. Problem: Dysphagia (Adult)  Goal: *Acute Goals and Plan of Care (Insert Text)  Description: Speech Therapy Goals  Initiated 11/28/2022  -Patient stated goal: unable to state due to AMS  -Patient will participate in repeat modified barium swallow study within 7 day(s), if indicated pending pt's progress. [ ] Not met  [ ]  MET   [ ] Progressing  [ ] Napolean   -Patient will tolerate puree diet with thin liquids without signs/symptoms of aspiration given no cues within 7 day(s). [ ] Not met  [ ]  MET   [ ] Progressing  [ ] Napolean   -Patient will demonstrate understanding of swallow safety precautions and aspiration precautions, diet recs with mod cues within 7 day(s).         [ ] Not met  [ ]  MET   [ ] Progressing  [ ] Discontinue    Outcome: Ilene Owusu M.S. CCC-SLP  Time Calculation: 12 mins

## 2022-11-30 NOTE — PROGRESS NOTES
Progress Note      11/30/2022 6:49 AM  NAME: Omar Amor   MRN:  [id number]   Admit Diagnosis: Cardiac arrest [facility]) [I46.9]  Aspiration into airway [T17.908A]      Problem List:   1. Incomplete database secondary to patient being a poor historian  2. Dementia  3. Dysphagia  4. Gastroesophageal reflux disease (GERD)  5.  Bilateral aspiration pneumonia  6. Sepsis  7. Status postcardiac arrest secondary to aspiration pneumonia  8. Grade I (mild) diastolic dysfunction, or impaired relaxation  9. Previous cerebrovascular accident (CVA)  8. Hypertension    11. Debility  12. Leukocytosis   13. Electrolyte abnormalities (chloremia, hypocalcemia, and hypophosphatemia)       Subjective:     Omar Amor, is seen and examined in room 206. There were no acute cardiovascular events reported overnight. Currently, she appears to deny any cardiovascular complaints.   Medications Personally Reviewed:    Current Facility-Administered Medications   Medication Dose Route Frequency    enoxaparin (LOVENOX) injection 40 mg  40 mg SubCUTAneous DAILY AFTER BREAKFAST    sertraline (ZOLOFT) tablet 25 mg  25 mg Oral DAILY    aspirin delayed-release tablet 81 mg  81 mg Oral DAILY    latanoprost (XALATAN) 0.005 % ophthalmic solution 1 Drop  1 Drop Both Eyes QHS    losartan (COZAAR) tablet 50 mg  50 mg Oral DAILY    pantoprazole (PROTONIX) tablet 40 mg  40 mg Oral DAILY    carvediloL (COREG) tablet 3.125 mg  3.125 mg Oral BID WITH MEALS    sodium chloride (NS) flush 5-40 mL  5-40 mL IntraVENous Q8H    sodium chloride (NS) flush 5-40 mL  5-40 mL IntraVENous PRN    acetaminophen (TYLENOL) tablet 650 mg  650 mg Oral Q6H PRN    Or    acetaminophen (TYLENOL) suppository 650 mg  650 mg Rectal Q6H PRN    ondansetron (ZOFRAN ODT) tablet 4 mg  4 mg Oral Q6H PRN    Or    ondansetron (ZOFRAN) injection 4 mg  4 mg IntraVENous Q6H PRN    albuterol (PROVENTIL HFA, VENTOLIN HFA, PROAIR HFA) inhaler 2 Puff  2 Puff Inhalation Q4H PRN levoFLOXacin (LEVAQUIN) tablet 750 mg  750 mg Oral Q48H    albuterol-ipratropium (DUO-NEB) 2.5 MG-0.5 MG/3 ML  3 mL Nebulization Q6H PRN           Objective:      Physical Exam:  Essentially unchanged  Last 24hrs VS reviewed since prior progress note. Most recent are:    Visit Vitals  BP (!) 95/57 (BP 1 Location: Left lower arm, BP Patient Position: At rest;Sitting)   Pulse 68   Temp 98.3 °F (36.8 °C)   Resp 18   Ht 5' 7\" (1.702 m)   Wt 72.7 kg (160 lb 4.4 oz)   SpO2 97%   Breastfeeding No   BMI 25.10 kg/m²       Intake/Output Summary (Last 24 hours) at 11/30/2022 0649  Last data filed at 11/29/2022 1707  Gross per 24 hour   Intake 120 ml   Output 200 ml   Net -80 ml          Data Review    Telemetry: Sinus rhythm without ventricular ectopy. EKG:   []  No new EKG for review    Lab Data Personally Reviewed:    Recent Labs     11/29/22  0621 11/27/22  1435   WBC 21.5* 8.5   HGB 12.3 12.9   HCT 37.4 41.4    273     Recent Labs     11/27/22  1435   INR 1.2*   PTP 14.4      Recent Labs     11/29/22 0621 11/28/22 0224 11/27/22  1435    141 141   K 4.4 5.1 4.2   * 118* 113*   CO2 17* 13* 15*   BUN 20 22* 27*   CREA 0.89 0.91 1.17*   * 176* 153*   CA 7.5* 7.2* 7.1*   MG  --   --  1.7     Recent Labs     11/27/22  1435   CPK 56.76     No results found for: CHOL, CHOLX, CHLST, CHOLV, HDL, HDLP, LDL, LDLC, DLDLP, Scottie Risen, CHHD, CHHDX    Recent Labs     11/29/22  0621 11/28/22 0224 11/27/22  1435     --  130*   TP 6.3*  --  6.7   ALB 2.9* 2.9* 2.8*   GLOB 3.4  --  3.9     Recent Labs     11/27/22  1446   PH 7.25*   PCO2 33*   PO2 81           Assessment/Plan:   1. Continue telemetry monitoring for 24-72 hours  2. Continue to monitor serum electrolytes, renal function  3. Continue to monitor fluid balance, daily weights  4.   Continue current cardiovascular medications including aspirin, carvedilol, enoxaparin, and losartan     Anjali Jeronimo MD

## 2022-11-30 NOTE — PROGRESS NOTES
Hospitalist Progress Note              Daily Progress Note: 11/30/2022      Subjective:   Patient examined alert and was reviewed. Patient alert but was febrile last night.   No other acute issues reported to me by staff at this time      Objective:     Vitals:  Patient Vitals for the past 12 hrs:   Temp Pulse Resp BP SpO2   11/30/22 0800 -- 78 -- -- --   11/30/22 0707 98.9 °F (37.2 °C) 77 18 127/60 99 %   11/30/22 0000 -- 68 -- -- --   11/29/22 2228 98.3 °F (36.8 °C) 65 18 (!) 95/57 97 %          Physical exam:  General chronically ill looking  Neck supple  CVS RRR  Respiratory symmetric expansion  Abdomen soft, ND  Skin no visible rash  Neuro alert   Psych calm    Lab Results:  Recent Results (from the past 24 hour(s))   ECHO ADULT COMPLETE    Collection Time: 11/29/22  4:30 PM   Result Value Ref Range    LV EDV A2C 48 mL    LV EDV A4C 55 mL    LV ESV A2C 12 mL    LV ESV A4C 28 mL    IVSd 0.6 0.6 - 0.9 cm    LVIDd 3.7 (A) 3.9 - 5.3 cm    LVIDs 2.8 cm    LVOT Diameter 1.8 cm    LVOT Mean Gradient 2 mmHg    LVOT VTI 19.1 cm    LVOT Peak Velocity 1.1 m/s    LVOT Peak Gradient 4 mmHg    LVPWd 1.0 (A) 0.6 - 0.9 cm    LV E' Lateral Velocity 5 cm/s    LV E' Septal Velocity 9 cm/s    LV Ejection Fraction A2C 75 %    LV Ejection Fraction A4C 49 %    LVOT Area 2.5 cm2    LVOT SV 48.6 ml    LA Minor Axis 4.9 cm    LA Major [place] 4.1 cm    LA Area 2C 13.8 cm2    LA Area 4C 11.9 cm2    LA Volume BP 32 22 - 52 mL    LA Diameter 2.8 cm    AV Mean Gradient 4 mmHg    AV VTI 28.5 cm    AV Mean Velocity 0.9 m/s    AV Peak Velocity 1.2 m/s    AV Peak Gradient 6 mmHg    AV Area by VTI 1.7 cm2    AV Area by Peak Velocity 2.2 cm2    Aortic Root 2.7 cm    MV E Wave Deceleration Time 173.0 ms    MV A Velocity 0.97 m/s    MV E Velocity 0.78 m/s    RVIDd 4.9 cm    TR Max Velocity 2.52 m/s    TR Peak Gradient 25 mmHg    Fractional Shortening 2D 24 28 - 44 %    LV ESV Index A4C 15 mL/m2    LV EDV Index A4C 30 mL/m2    LV ESV Index A2C 7 mL/m2    LV EDV Index A2C 26 mL/m2    LVIDd Index 2.01 cm/m2    LVIDs Index 1.52 cm/m2    LV RWT Ratio 0.54     LV Mass 2D 82.3 67 - 162 g    LV Mass 2D Index 44.7 43 - 95 g/m2    MV E/A 0.80     E/E' Ratio (Averaged) 12.13     E/E' Lateral 15.60     E/E' Septal 8.67     LA Volume Index BP 17 16 - 34 ml/m2    LVOT Stroke Volume Index 26.4 mL/m2    LA Size Index 1.52 cm/m2    LA/AO Root Ratio 1.04     Ao Root Index 1.47 cm/m2    AV Velocity Ratio 0.92     LVOT:AV VTI Index 0.67     MONIQUE/BSA VTI 0.9 cm2/m2    MONIQUE/BSA Peak Velocity 1.2 cm2/m2    EF BP 49 (A) 55 - 100 %    Est. RA Pressure 3 mmHg    RVSP 28 mmHg   CBC WITH AUTOMATED DIFF    Collection Time: 11/30/22  6:14 AM   Result Value Ref Range    WBC 13.3 (H) 3.6 - 11.0 K/uL    RBC 3.95 3.80 - 5.20 M/uL    HGB 11.4 (L) 11.5 - 16.0 g/dL    HCT 36.2 35.0 - 47.0 %    MCV 91.6 80.0 - 99.0 FL    MCH 28.9 26.0 - 34.0 PG    MCHC 31.5 30.0 - 36.5 g/dL    RDW 16.4 (H) 11.5 - 14.5 %    PLATELET 726 845 - 408 K/uL    MPV 10.4 8.9 - 12.9 FL    NRBC 0.0 0.0  WBC    ABSOLUTE NRBC 0.00 0.00 - 0.01 K/uL    NEUTROPHILS 79 (H) 32 - 75 %    LYMPHOCYTES 10 (L) 12 - 49 %    MONOCYTES 6 5 - 13 %    EOSINOPHILS 4 0 - 7 %    BASOPHILS 0 0 - 1 %    IMMATURE GRANULOCYTES 1 (H) 0 - 0.5 %    ABS. NEUTROPHILS 10.5 (H) 1.8 - 8.0 K/UL    ABS. LYMPHOCYTES 1.3 0.8 - 3.5 K/UL    ABS. MONOCYTES 0.8 0.0 - 1.0 K/UL    ABS. EOSINOPHILS 0.6 (H) 0.0 - 0.4 K/UL    ABS. BASOPHILS 0.1 0.0 - 0.1 K/UL    ABS. IMM.  GRANS. 0.1 (H) 0.00 - 0.04 K/UL    DF AUTOMATED        Results       Procedure Component Value Units Date/Time    COVID-19 WITH INFLUENZA A/B [[id number]] Collected: 11/27/22 1900    Order Status: Canceled Specimen: Nasopharyngeal from Nasopharynx     CULTURE, BLOOD, PAIRED [[id number]] Collected: 11/27/22 1415    Order Status: Canceled Specimen: Blood            ECHO- Interpretation Summary    Result status: Final result       Left Ventricle: Reduced left ventricular systolic function with a visually estimated EF of 70 - 75%. Left ventricle size is normal. Normal wall thickness. Findings consistent with mild concentric hypertrophy. Global hypokinesis present. Grade I diastolic dysfunction with normal LAP. Right Ventricle: Right ventricle is mildly dilated. Mildly reduced systolic function. Findings consistent with Delarosa's sign. Aortic Valve: Tricuspid valve. Mild regurgitation. Mitral Valve: Findings consistent with rheumatic disease. Tricuspid Valve: Valve structure is normal. Mildly thickened leaflets. Technical qualifiers: Echo study was technically difficult due to patient's body habitus.        Current Medications:    Current Facility-Administered Medications:     aspirin chewable tablet 81 mg, 81 mg, Oral, DAILY, Quoc Boyd MD, 81 mg at 11/30/22 0944    pantoprazole (PROTONIX) granules for oral suspension 40 mg, 40 mg, Oral, ACB, Quoc Boyd MD, 40 mg at 11/30/22 0944    enoxaparin (LOVENOX) injection 40 mg, 40 mg, SubCUTAneous, DAILY AFTER BREAKFAST, Quoc Boyd MD, 40 mg at 11/30/22 0904    sertraline (ZOLOFT) tablet 25 mg, 25 mg, Oral, DAILY, Shira Carrera MD, 25 mg at 11/30/22 0904    latanoprost (XALATAN) 0.005 % ophthalmic solution 1 Drop, 1 Drop, Both Eyes, QHS, Shira Carrera MD    losartan (COZAAR) tablet 50 mg, 50 mg, Oral, DAILY, Shira Carrera MD, 50 mg at 11/30/22 0904    carvediloL (COREG) tablet 3.125 mg, 3.125 mg, Oral, BID WITH MEALS, Shira Carrera MD, 3.125 mg at 11/30/22 0904    sodium chloride (NS) flush 5-40 mL, 5-40 mL, IntraVENous, Q8H, Shira Carrera MD, 10 mL at 11/30/22 0511    sodium chloride (NS) flush 5-40 mL, 5-40 mL, IntraVENous, PRN, Shira Carrera MD    acetaminophen (TYLENOL) tablet 650 mg, 650 mg, Oral, Q6H PRN, 650 mg at 11/29/22 1655 **OR** acetaminophen (TYLENOL) suppository 650 mg, 650 mg, Rectal, Q6H PRN, Shira Carrera MD    ondansetron (ZOFRAN ODT) tablet 4 mg, 4 mg, Oral, Q6H PRN **OR** ondansetron (ZOFRAN) injection 4 mg, 4 mg, IntraVENous, Q6H PRN, Jose Perez MD    albuterol (PROVENTIL HFA, VENTOLIN HFA, PROAIR HFA) inhaler 2 Puff, 2 Puff, Inhalation, Q4H PRN, Jose Perez MD    levoFLOXacin (LEVAQUIN) tablet 750 mg, 750 mg, Oral, Q48H, Jose Perez MD, 750 mg at 11/30/22 0511    albuterol-ipratropium (DUO-NEB) 2.5 MG-0.5 MG/3 ML, 3 mL, Nebulization, Q6H PRN, Jose Perez MD, 3 mL at 11/29/22 1753       ASSESSMENT:  81 y.o. female with advanced dementia, hypertension, for long time bedridden status resident of long-term care facility found on the floor unconscious. Staff hold some sounds from her laying down, when they went to the room she was on the floor and not breathing found with no pulse. Started CPR and called EMS. CPR done for 10 minutes with pulse back, when EMT arrived they were able to expel the food bolus that was obstructing from the mouth. Patient was back to herself, has significant cough. EMT did not continue any CPR and brought to the emergency room. She is alert but with wheezing and mild respiratory distress. Was given breathing treatments twice with much improvement still with some wheezing and rhonchi present. Transferred here for further care. Cardiac arrest from aspiration-EKG shows NSR, cards on case defer to them,   Echo as above EF 70%    Pneumonia from Aspiration of food bolus- Diet as per SLP eval, Levaquin to cover for aspiration,PCN allergy. WBC 13 K CX not send in ED. Still low grade fevers. Hypertension-continue coreg 3.125mg bid, losartan 50mg daily, asa 81mg    Senile dementia-continue supportive care    Hx depression-managed with zoloft 25mg daily    6. C1 canal stenosis-chr per report  per ortho spine eval no intervention planned, considering advance age she may not be a surgical candidate ? 7.  Mild Low TSH- repeat TSH with FT4        Full Code  Dvt Prophylaxis: sq lovenox  NOK- Brother ( Rivera Quintanilla @  [phone]) .   Dispo: LTC once stable 24-48hrs      Quoc Eduardo Do, MD

## 2022-11-30 NOTE — PROGRESS NOTES
Problem: Patient Education: Go to Patient Education Activity  Goal: Patient/Family Education  Outcome: Progressing Towards Goal     Problem: Pressure Injury - Risk of  Goal: *Prevention of pressure injury  Description: Document Abiel Scale and appropriate interventions in the flowsheet. Outcome: Progressing Towards Goal  Note: Pressure Injury Interventions:  Sensory Interventions: Assess changes in LOC    Moisture Interventions: Absorbent underpads    Activity Interventions: Assess need for specialty bed    Mobility Interventions: Assess need for specialty bed    Nutrition Interventions: Document food/fluid/supplement intake, Offer support with meals,snacks and hydration    Friction and Shear Interventions: Apply protective barrier, creams and emollients                Problem: Patient Education: Go to Patient Education Activity  Goal: Patient/Family Education  Outcome: Progressing Towards Goal     Problem: Falls - Risk of  Goal: *Absence of Falls  Description: Document Gloria Fall Risk and appropriate interventions in the flowsheet.   Outcome: Progressing Towards Goal  Note: Fall Risk Interventions:  Mobility Interventions: Bed/chair exit alarm    Mentation Interventions: Bed/chair exit alarm    Medication Interventions: Bed/chair exit alarm    Elimination Interventions: Bed/chair exit alarm              Problem: Patient Education: Go to Patient Education Activity  Goal: Patient/Family Education  Outcome: Progressing Towards Goal

## 2022-11-30 NOTE — PROGRESS NOTES
Assumed care of patient at 1600. Awake in bed with eyes open, oriented to self and general place. Missing teeth noted. Right sided weakness/contraction noted and consistent with history of CVA with deficits. Tele box on, normal sinus rhythm, S1S2 with slight murmur noted, cardiology following, echo completed. Bowel sounds positive, abdomen soft and nontender. Priscila Roses in place collecting dark yellow urine. Lungs clear with 2L nasal cannula in place. Occasional cough. No immediate concerns from nurse or patient, will continue to monitor.

## 2022-12-01 VITALS
BODY MASS INDEX: 25.16 KG/M2 | OXYGEN SATURATION: 98 % | WEIGHT: 160.27 LBS | RESPIRATION RATE: 18 BRPM | HEART RATE: 68 BPM | TEMPERATURE: 98.8 F | SYSTOLIC BLOOD PRESSURE: 116 MMHG | DIASTOLIC BLOOD PRESSURE: 64 MMHG | HEIGHT: 67 IN

## 2022-12-01 PROCEDURE — 74011000250 HC RX REV CODE- 250: Performed by: HOSPITALIST

## 2022-12-01 PROCEDURE — 77010033678 HC OXYGEN DAILY

## 2022-12-01 PROCEDURE — 94761 N-INVAS EAR/PLS OXIMETRY MLT: CPT

## 2022-12-01 PROCEDURE — 74011250636 HC RX REV CODE- 250/636: Performed by: HOSPITALIST

## 2022-12-01 PROCEDURE — 74011250637 HC RX REV CODE- 250/637: Performed by: HOSPITALIST

## 2022-12-01 RX ORDER — IPRATROPIUM BROMIDE AND ALBUTEROL SULFATE 2.5; .5 MG/3ML; MG/3ML
3 SOLUTION RESPIRATORY (INHALATION)
Qty: 30 EACH | Refills: 0 | Status: SHIPPED
Start: 2022-12-01

## 2022-12-01 RX ORDER — LEVOFLOXACIN 750 MG/1
750 TABLET ORAL
Qty: 5 TABLET | Refills: 0 | Status: SHIPPED
Start: 2022-12-02

## 2022-12-01 RX ADMIN — CARVEDILOL 3.12 MG: 3.12 TABLET, FILM COATED ORAL at 16:26

## 2022-12-01 RX ADMIN — SODIUM CHLORIDE, PRESERVATIVE FREE 10 ML: 5 INJECTION INTRAVENOUS at 15:14

## 2022-12-01 RX ADMIN — SODIUM CHLORIDE, PRESERVATIVE FREE 10 ML: 5 INJECTION INTRAVENOUS at 06:04

## 2022-12-01 RX ADMIN — CARVEDILOL 3.12 MG: 3.12 TABLET, FILM COATED ORAL at 09:30

## 2022-12-01 RX ADMIN — PANTOPRAZOLE SODIUM 40 MG: 40 GRANULE, DELAYED RELEASE ORAL at 09:27

## 2022-12-01 RX ADMIN — ENOXAPARIN SODIUM 40 MG: 100 INJECTION SUBCUTANEOUS at 09:28

## 2022-12-01 RX ADMIN — ASPIRIN 81 MG CHEWABLE TABLET 81 MG: 81 TABLET CHEWABLE at 09:27

## 2022-12-01 RX ADMIN — LOSARTAN POTASSIUM 25 MG: 25 TABLET, FILM COATED ORAL at 09:27

## 2022-12-01 RX ADMIN — SERTRALINE 25 MG: 25 TABLET, FILM COATED ORAL at 09:28

## 2022-12-01 NOTE — PROGRESS NOTES
24 hour chart check complete. Unreconciled transfer orders present. No signed and held orders present.

## 2022-12-01 NOTE — PROGRESS NOTES
Discharge Note. Called and gave report to Belkis Bello LPN from receiving facility. Receiving LPN denies any further questions. Called and notified patient's niece, Maureen Bañuelos that patient is being discharged back to [facility]. Report given to [facility] unit patient leaves. Discharge plan of care/case management plan validated with provider discharge order.

## 2022-12-01 NOTE — DISCHARGE INSTRUCTIONS
Recommend continue puree, thin liquid diet. 1:1 assistance with ALL PO intake, STRICT aspiration and GERD precautions, monitor pt closely for s/s aspiration, meds crushed if able in puree, FEED ONLY IF AWAKE AND ALERT. Rec GI consult to further assess esophageal function.  Rec MBS if medically indicated    Activity slowly increase to levels as before  Strict fall/aspiration/pressure ulcer precautions  Check labs in 7 days CBC/BMP/MAG/TSH/FT4  Patient return to ED or call 911 immediately if symptoms recur or get worse

## 2022-12-01 NOTE — PROGRESS NOTES
Problem: Patient Education: Go to Patient Education Activity  Goal: Patient/Family Education  Outcome: Progressing Towards Goal     Problem: Pressure Injury - Risk of  Goal: *Prevention of pressure injury  Description: Document Abiel Scale and appropriate interventions in the flowsheet. Outcome: Progressing Towards Goal  Note: Pressure Injury Interventions:  Sensory Interventions: Float heels, Minimize linen layers, Maintain/enhance activity level, Assess changes in LOC    Moisture Interventions: Absorbent underpads, Apply protective barrier, creams and emollients, Check for incontinence Q2 hours and as needed    Activity Interventions: PT/OT evaluation    Mobility Interventions: HOB 30 degrees or less, Float heels    Nutrition Interventions: Offer support with meals,snacks and hydration    Friction and Shear Interventions: HOB 30 degrees or less                Problem: Patient Education: Go to Patient Education Activity  Goal: Patient/Family Education  Outcome: Progressing Towards Goal     Problem: Falls - Risk of  Goal: *Absence of Falls  Description: Document Gloria Fall Risk and appropriate interventions in the flowsheet.   Outcome: Progressing Towards Goal  Note: Fall Risk Interventions:  Mobility Interventions: Bed/chair exit alarm    Mentation Interventions: Bed/chair exit alarm    Medication Interventions: Bed/chair exit alarm    Elimination Interventions: Bed/chair exit alarm, Call light in reach, Patient to call for help with toileting needs              Problem: Patient Education: Go to Patient Education Activity  Goal: Patient/Family Education  Outcome: Progressing Towards Goal

## 2022-12-01 NOTE — DISCHARGE SUMMARY
Hospitalist Discharge Summary     Patient ID:  Robert Kemp  [id number]  89 y.o.  1935 11/27/2022    PCP on record: Maria Isabel Kwan MD    Admit date: 11/27/2022  Discharge date and time: 12/1/2022    DISCHARGE DIAGNOSIS:  Cardiac arrest from aspiration  Suspected aspiration pneumonia  HTN  Dysphagia on special diet    CONSULTATIONS:  IP CONSULT TO CARDIOLOGY  IP CONSULT TO ORTHOPEDIC SURGERY    Excerpted HPI from H&P of [address] MD:  81 y.o. female with advanced dementia, hypertension, for long time bedridden status resident of long-term care facility found on the floor unconscious. Staff hold some sounds from her laying down, when they went to the room she was on the floor and not breathing found with no pulse. Started CPR and called EMS. CPR done for 10 minutes with pulse back, when EMT arrived they were able to expel the food bolus that was obstructing from the mouth. Patient was back to herself, has significant cough. EMT did not continue any CPR and brought to the emergency room. She is alert but with wheezing and mild respiratory distress. Was given breathing treatments twice with much improvement still with some wheezing and rhonchi present. Transferred here for further care. Patient is in the hallway bed pleasant not in any distress, she is just talking but not related to our questions and she cannot carry on a conversation. ______________________________________________________________________  DISCHARGE SUMMARY/HOSPITAL COURSE:  for full details see H&P, daily progress notes, labs, consult notes. 81 y.o. female with advanced dementia, hypertension, for long time bedridden status resident of long-term care facility found on the floor unconscious. Staff hold some sounds from her laying down, when they went to the room she was on the floor and not breathing found with no pulse. Started CPR and called EMS.   CPR done for 10 minutes with pulse back, when EMT arrived they were able to expel the food bolus that was obstructing from the mouth. Patient was back to herself, has significant cough. EMT did not continue any CPR and brought to the emergency room. She is alert but with wheezing and mild respiratory distress. Was given breathing treatments twice with much improvement still with some wheezing and rhonchi present. Transferred here for further care. Cardiac arrest from aspiration-EKG shows NSR, cards on case defer to them,   Echo as above EF 70%. No further recommendations per cardiology    Pneumonia from Aspiration of food bolus- Diet as per SLP eval, Levaquin to cover for aspiration,PCN allergy. WBC 13 K CX not send in ED. Afebrile now. Hypertension-continue coreg 3.125mg bid, losartan 50mg daily, asa 81mg    Senile dementia-continue supportive care    Hx depression-managed with zoloft 25mg daily    6. C1 canal stenosis-chr per report  per ortho spine eval no intervention planned, considering advance age she may not be a surgical candidate ? 7. Mild Low TSH- repeat TSH with FT4 at LTC 7 days        Full Code  Dvt Prophylaxis: sq lovenox  [facility]- Brother Taye Quinn @  [phone]) . Dispo: LTC today if accepted.    _______________________________________________________________________  Patient seen and examined by me on discharge day. Patient maximized inpatient benefit stay and no further intervention planned by cardiology. Patient stable for transfer back to long-term care facility at this time. I called [facility] and was unable to reach on phone at this time. ___________________________________________________________  DISCHARGE MEDICATIONS:   Current Discharge Medication List        START taking these medications    Details   albuterol-ipratropium (DUO-NEB) 2.5 mg-0.5 mg/3 ml nebu 3 mL by Nebulization route every six (6) hours as needed for Wheezing.   Qty: 30 Each, Refills: 0  Start date: 12/1/2022      levoFLOXacin (LEVAQUIN) 750 mg tablet Take 1 Tablet by mouth every fourty-eight (48) hours. Qty: 5 Tablet, Refills: 0  Start date: 12/2/2022           CONTINUE these medications which have NOT CHANGED    Details   carvediloL (COREG) 3.125 mg tablet Take 1 Tablet by mouth two (2) times daily (with meals). Indications: high blood pressure  Qty: 60 Tablet, Refills: 0      aspirin delayed-release 81 mg tablet Take 81 mg by mouth daily. latanoprost (XALATAN) 0.005 % ophthalmic solution Administer 1 Drop to both eyes nightly. losartan (COZAAR) 50 mg tablet Take 50 mg by mouth daily. omeprazole (PRILOSEC) 20 mg capsule Take 20 mg by mouth daily. potassium chloride SR (KLOR-CON 10) 10 mEq tablet Take 20 mEq by mouth daily. sertraline (ZOLOFT) 50 mg tablet Take 50 mg by mouth daily. Indications: major depressive disorder      acetaminophen (TYLENOL) 500 mg tablet Take 500 mg by mouth every four (4) hours as needed for Pain. loperamide (IMODIUM) 2 mg capsule Take 4 mg by mouth as needed for Diarrhea.  Give 2 capsules by mouth every hour as needed for diarrhea after each loose stool up to 4 doses               Patient Follow Up Instructions:   Diet as per SLP  Activity slowly increase to levels as before  Strict fall/aspiration/pressure ulcer precautions  Check labs in 7 days CBC/BMP/MAG/TSH/FT4  Patient return to ED or call 911 immediately if symptoms recur or get worse  Follow-up Information       Follow up With Specialties Details Why Contact Info    Corby Acevedo MD Family Medicine Follow up in 1 week(s) Post hospital discharge follow-up [address] 22      Pam Quinn, [address] [facility] Vascular Surgery, Cardiovascular Disease Physician Follow up in 2 week(s) [facility]  [phone]            ________________________________________________________________    Risk of deterioration: High due to multiple comorbidities and elderly age    Condition at Discharge:  Stable  __________________________________________________________________    Disposition  SNF/LTC    ____________________________________________________________________    Code Status: Full Code  ___________________________________________________________________      Total time in minutes spent coordinating this discharge  33 minutes    Signed:  MD Nasrin Wheatley

## 2022-12-01 NOTE — PROGRESS NOTES
CM noted discharge order. Patient is discharging to [facility] located at [address] [place] [address], [facility] [place], room [address]. Report may be called to [phone]. Patient is clear to discharge to SNF by CM. Nurse is aware. Discharge plan of care/case management plan validated with provider discharge order. Medicare pt has received, reviewed, and signed 2nd IM letter informing them of their right to appeal the discharge. Signed copied has been placed on pt bedside chart.

## 2022-12-01 NOTE — PROGRESS NOTES
Hospitalist Progress Note              Daily Progress Note: 12/1/2022      Subjective:   Patient examined alert and was reviewed. Patient alert and afebrile today. Tolerating diet as per SLP  Discussed with staff no acute issues reported      Objective:     Vitals:  Patient Vitals for the past 12 hrs:   Temp Pulse Resp BP SpO2   12/01/22 1200 -- 67 -- -- --   12/01/22 1045 -- -- -- -- 97 %   12/01/22 0837 97.5 °F (36.4 °C) 61 18 (!) 163/78 98 %   12/01/22 0800 -- 62 -- -- --   12/01/22 0554 99.4 °F (37.4 °C) 65 18 (!) 142/71 100 %   12/01/22 0400 -- 75 -- -- --          Physical exam:  General chronically ill looking  Neck supple  CVS RRR  Respiratory symmetric expansion  Abdomen soft, ND  Skin no visible rash  Neuro alert   Psych calm    Lab Results:  No results found for this or any previous visit (from the past 24 hour(s)). Results       Procedure Component Value Units Date/Time    COVID-19 WITH INFLUENZA A/B [[id number]] Collected: 11/27/22 1900    Order Status: Canceled Specimen: Nasopharyngeal from Nasopharynx     CULTURE, BLOOD, PAIRED [[id number]] Collected: 11/27/22 1415    Order Status: Canceled Specimen: Blood            ECHO- Interpretation Summary    Result status: Final result       Left Ventricle: Reduced left ventricular systolic function with a visually estimated EF of 70 - 75%. Left ventricle size is normal. Normal wall thickness. Findings consistent with mild concentric hypertrophy. Global hypokinesis present. Grade I diastolic dysfunction with normal LAP. Right Ventricle: Right ventricle is mildly dilated. Mildly reduced systolic function. Findings consistent with Delarosa's sign. Aortic Valve: Tricuspid valve. Mild regurgitation. Mitral Valve: Findings consistent with rheumatic disease. Tricuspid Valve: Valve structure is normal. Mildly thickened leaflets. Technical qualifiers: Echo study was technically difficult due to patient's body habitus.        Current Medications:    Current Facility-Administered Medications:     aspirin chewable tablet 81 mg, 81 mg, Oral, DAILY, Quoc Boyd MD, 81 mg at 12/01/22 7529    pantoprazole (PROTONIX) granules for oral suspension 40 mg, 40 mg, Oral, ACB, Quoc Boyd MD, 40 mg at 12/01/22 7191    losartan (COZAAR) tablet 25 mg, 25 mg, Oral, DAILY, Quoc Boyd MD, 25 mg at 12/01/22 0927    enoxaparin (LOVENOX) injection 40 mg, 40 mg, SubCUTAneous, DAILY AFTER BREAKFAST, Quoc Boyd MD, 40 mg at 12/01/22 2818    sertraline (ZOLOFT) tablet 25 mg, 25 mg, Oral, DAILY, Juan Edwards MD, 25 mg at 12/01/22 0928    latanoprost (XALATAN) 0.005 % ophthalmic solution 1 Drop, 1 Drop, Both Eyes, QHS, Juan Edwards MD, 1 Drop at 11/30/22 2220    carvediloL (COREG) tablet 3.125 mg, 3.125 mg, Oral, BID WITH MEALS, Juan Edwards MD, 3.125 mg at 12/01/22 0930    sodium chloride (NS) flush 5-40 mL, 5-40 mL, IntraVENous, Q8H, Juan Edwards MD, 10 mL at 12/01/22 0604    sodium chloride (NS) flush 5-40 mL, 5-40 mL, IntraVENous, PRN, Juan Edwards MD    acetaminophen (TYLENOL) tablet 650 mg, 650 mg, Oral, Q6H PRN, 650 mg at 11/29/22 1655 **OR** acetaminophen (TYLENOL) suppository 650 mg, 650 mg, Rectal, Q6H PRN, Juan Edwards MD    ondansetron (ZOFRAN ODT) tablet 4 mg, 4 mg, Oral, Q6H PRN **OR** ondansetron (ZOFRAN) injection 4 mg, 4 mg, IntraVENous, Q6H PRN, Juan Edwards MD    albuterol (PROVENTIL HFA, VENTOLIN HFA, PROAIR HFA) inhaler 2 Puff, 2 Puff, Inhalation, Q4H PRN, Juan Edwards MD    levoFLOXacin (LEVAQUIN) tablet 750 mg, 750 mg, Oral, Q48H, Juan Edwards MD, 750 mg at 11/30/22 0511    albuterol-ipratropium (DUO-NEB) 2.5 MG-0.5 MG/3 ML, 3 mL, Nebulization, Q6H PRN, Juan Edwards MD, 3 mL at 11/29/22 4590       ASSESSMENT:  81 y.o. female with advanced dementia, hypertension, for long time bedridden status resident of long-term care facility found on the floor unconscious. Staff hold some sounds from her laying down, when they went to the room she was on the floor and not breathing found with no pulse. Started CPR and called EMS. CPR done for 10 minutes with pulse back, when EMT arrived they were able to expel the food bolus that was obstructing from the mouth. Patient was back to herself, has significant cough. EMT did not continue any CPR and brought to the emergency room. She is alert but with wheezing and mild respiratory distress. Was given breathing treatments twice with much improvement still with some wheezing and rhonchi present. Transferred here for further care. Cardiac arrest from aspiration-EKG shows NSR, cards on case defer to them,   Echo as above EF 70%. No further recommendations per cardiology    Pneumonia from Aspiration of food bolus- Diet as per SLP eval, Levaquin to cover for aspiration,PCN allergy. WBC 13 K CX not send in ED. Afebrile now. Hypertension-continue coreg 3.125mg bid, losartan 50mg daily, asa 81mg    Senile dementia-continue supportive care    Hx depression-managed with zoloft 25mg daily    6. C1 canal stenosis-chr per report  per ortho spine eval no intervention planned, considering advance age she may not be a surgical candidate ? 7. Mild Low TSH- repeat TSH with FT4        Full Code  Dvt Prophylaxis: sq lovenox  [facility]- Brother Austin Bob @  [phone]) . Dispo: LTC today if accepted.         Quoc Eduardo Do, MD

## 2022-12-02 NOTE — PROGRESS NOTES
IV removed. Tele removed by prior nurse and returned. Purewick removed. Patient transported by [employer] No concerns. [facility] called and made aware. Willard Escudero, called and updated about discharge. Patient was awake, alert and stable at time of discharge.

## 2022-12-06 NOTE — PROGRESS NOTES
Physician Progress Note      PATIENT:               Luiz Valdez  CSN #:                  [id number]  :                       1935  ADMIT DATE:       2022 10:58 PM  [address] DATE:        2022 6:30 PM  RESPONDING  PROVIDER #:        Beatriz ESPINOZA MD          QUERY TEXT:    Pt admitted with cardiac arrest due to aspiration pneumonia. Noted documentation of sepsis on  by ordered cardiology consultant. If possible, please document in progress notes and discharge summary:      The medical record reflects the following:  Risk Factors: 82 yo female with aspiration pneumonia, cardiac arrest, HTN  Clinical Indicators:  WBC 21.5  Lactic Acid 2.8  BP 95/57  HT 94  Treatment: Oral Levaquin    Thank you,  Brandy Higgins RN, CCDS  Options provided:  -- Sepsis confirmed present on admission  -- Sepsis confirmed not present on admission  -- Sepsis ruled out  -- Other - I will add my own diagnosis  -- Disagree - Not applicable / Not valid  -- Disagree - Clinically unable to determine / Unknown  -- Refer to Clinical Documentation Reviewer    PROVIDER RESPONSE TEXT:    The diagnosis of sepsis was ruled out.     Query created by: Wing Huertas on 2022 7:38 AM      Electronically signed by:  Niranjan Dorado MD 2022 1:00 PM